# Patient Record
Sex: MALE | Race: WHITE | NOT HISPANIC OR LATINO | Employment: FULL TIME | URBAN - METROPOLITAN AREA
[De-identification: names, ages, dates, MRNs, and addresses within clinical notes are randomized per-mention and may not be internally consistent; named-entity substitution may affect disease eponyms.]

---

## 2017-01-03 ENCOUNTER — ALLSCRIPTS OFFICE VISIT (OUTPATIENT)
Dept: OTHER | Facility: OTHER | Age: 62
End: 2017-01-03

## 2018-01-10 NOTE — RESULT NOTES
Message      Colon polyps removed came back as precancerous tubular adenoma  Next colonoscopy in 3 years  Patient to call our office for any GI symptoms  lmom for pt to call office for results  reminder set  tmc         Verified Results  (1) TISSUE EXAM [id number] 09:44AM [facility]     Test Name Result Flag Reference   LAB AP CASE REPORT (Report)     Surgical Pathology Report             Case: [id number]                   Authorizing Provider: Mila Avila MD     Collected:      11/19/2016 0944        Ordering Location:   [facility]    Received:      11/19/2016 [facility] Endoscopy                               Pathologist:      Tono Rosario MD                              Specimen:  Polyp, Colorectal, ascending colon   LAB AP FINAL DIAGNOSIS (Report)     A  Colon, ascending, polypectomy:        - Tubular adenoma  - No high-grade dysplasia or malignancy is identified  Interpretation performed at , [address]   Electronically signed by Tono Rosario MD on 11/22/2016 at 12:48 PM   LAB AP SURGICAL ADDITIONAL INFORMATION (Report)     These tests were developed and their performance characteristics   determined by [facility] or [facility]  They may not be cleared or approved by the [place]  Food and   Drug Administration  The FDA has determined that such clearance or   approval is not necessary  These tests are used for clinical purposes  They should not be regarded as investigational or for research  This   laboratory has been approved by CLIA 88, designated as a high-complexity   laboratory and is qualified to perform these tests  LAB AP GROSS DESCRIPTION (Report)     A  The specimen is received in formalin, labeled with the patient's name   and hospital number, and is designated ascending colon polyp   The   specimen consists of a single tan soft tissue fragment measuring 0 5 cm    Entirely submitted  One cassette  Note: The estimated total formalin fixation time based upon information   provided by the submitting clinician and the standard processing schedule   is 52 25 hours      MAC

## 2018-01-14 VITALS
HEIGHT: 74 IN | SYSTOLIC BLOOD PRESSURE: 110 MMHG | WEIGHT: 286 LBS | DIASTOLIC BLOOD PRESSURE: 71 MMHG | HEART RATE: 73 BPM | BODY MASS INDEX: 36.7 KG/M2

## 2018-11-30 ENCOUNTER — OFFICE VISIT (OUTPATIENT)
Dept: OBGYN CLINIC | Facility: CLINIC | Age: 63
End: 2018-11-30
Payer: COMMERCIAL

## 2018-11-30 VITALS
SYSTOLIC BLOOD PRESSURE: 160 MMHG | BODY MASS INDEX: 37.22 KG/M2 | HEART RATE: 74 BPM | WEIGHT: 290 LBS | DIASTOLIC BLOOD PRESSURE: 82 MMHG | HEIGHT: 74 IN

## 2018-11-30 DIAGNOSIS — R07.9 LEFT SIDED CHEST PAIN: Primary | ICD-10-CM

## 2018-11-30 PROCEDURE — 99213 OFFICE O/P EST LOW 20 MIN: CPT | Performed by: ORTHOPAEDIC SURGERY

## 2018-11-30 RX ORDER — TERBINAFINE HYDROCHLORIDE 250 MG/1
TABLET ORAL
COMMUNITY
Start: 2018-11-14 | End: 2019-01-15

## 2018-11-30 RX ORDER — MELOXICAM 7.5 MG/1
TABLET ORAL
COMMUNITY
Start: 2018-11-13 | End: 2020-12-30 | Stop reason: ALTCHOICE

## 2018-11-30 NOTE — PROGRESS NOTES
Patient Name:  Johnny Squires  MRN:  [id number]    Assessment     1  Left sided chest pain         Plan     1  No signs of shoulder pathology on exam today  2  Consider esophageal or pulmonary etiology  3  Follow-up with PCP recommended  Patient has an esophageal doctor that he has treated with in the past and plans to schedule an evaluation with him  No Follow-up on file  Subjective     Patient returns today reporting pain in the anterior chest on the left radiating posteriorly to his medial periscapular region  Past surgical history is notable for a rotator cuff repair approximately 2 years ago  He was doing well until recently when he developed his current symptoms  He describes occasional exacerbation with eating a large meal, but the discomfort does not feel like heartburn, which he has had in the past requiring Prilosec until a few years ago  He recently had a ganglion cyst excised from the mid-clavicular region by Dr Marino Reich  No recent injury to his left upper extremity  He had a recent chest exam with his PCP which was unremarkable according to the patient  He is concerned about a recurrent rotator cuff problem  General ROS:  Negative for fever, lethargy/malaise, or night sweats  Neurological ROS:  Negative for confusion or seizures  Objective     /82   Pulse 74   Ht 6' 2" (1 88 m)   Wt 132 kg (290 lb)   BMI 37 23 kg/m²     Left  shoulder: No gross deformity  Nontender to palpation  Passive range of motion is full  Neer impingement sign is negative  Escalera impingement sign is negative  Empty can test is negative  Speed's test is negative  Cross body adduction test is negative  5/5 strength forward flexion  Skin is intact without erythema  2+ radial pulse

## 2018-12-20 ENCOUNTER — TELEPHONE (OUTPATIENT)
Dept: OBGYN CLINIC | Facility: HOSPITAL | Age: 63
End: 2018-12-20

## 2019-01-15 ENCOUNTER — OFFICE VISIT (OUTPATIENT)
Dept: OBGYN CLINIC | Facility: CLINIC | Age: 64
End: 2019-01-15
Payer: COMMERCIAL

## 2019-01-15 VITALS
HEART RATE: 79 BPM | SYSTOLIC BLOOD PRESSURE: 147 MMHG | DIASTOLIC BLOOD PRESSURE: 82 MMHG | WEIGHT: 291 LBS | BODY MASS INDEX: 37.35 KG/M2 | HEIGHT: 74 IN

## 2019-01-15 DIAGNOSIS — L02.414 CUTANEOUS ABSCESS OF LEFT UPPER EXTREMITY: ICD-10-CM

## 2019-01-15 DIAGNOSIS — M19.019 AC JOINT ARTHROPATHY: Primary | ICD-10-CM

## 2019-01-15 PROCEDURE — 99213 OFFICE O/P EST LOW 20 MIN: CPT | Performed by: ORTHOPAEDIC SURGERY

## 2019-01-15 RX ORDER — CLINDAMYCIN HYDROCHLORIDE 300 MG/1
300 CAPSULE ORAL 4 TIMES DAILY
Qty: 56 CAPSULE | Refills: 0 | Status: SHIPPED | OUTPATIENT
Start: 2019-01-15 | End: 2019-01-29

## 2019-01-15 NOTE — PROGRESS NOTES
Patient Name:  Sarika Marc  MRN:  [id number]    Assessment     1  AC joint arthropathy     2  Cutaneous abscess of left upper extremity  clindamycin (CLEOCIN) 300 MG capsule       Plan     Left shoulder AC joint arthropathy with associated ganglion cyst, left shoulder small superficial skin abscess  1  Prescription for clindamycin with regards to the abscess  2  Recommend warm compresses for the left shoulder abscess  3  Recommend continued conservative management for the left shoulder AC joint arthropathy  4  Patient will contact the office if the skin abscess worsens while on oral antibiotics  Follow-up as needed        Subjective     29-year-old male returns to the office today for follow-up regarding his left shoulder  He recently underwent an MRI and is here to review the results  He does note a cyst on the superior and anterior aspect of the shoulder which is minimally painful  He only notes discomfort when he sleeps in bed at night  He denies any weakness or stiffness in the shoulder  No numbness or tingling  No fevers or chills  General ROS:  Negative for fever, lethargy/malaise, or night sweats  Neurological ROS:  Negative for confusion or seizures  Objective     /82   Pulse 79   Ht 6' 2" (1 88 m)   Wt 132 kg (291 lb)   BMI 37 36 kg/m²     Left shoulder:  Fluid collection noted anterior aspect of mid clavicular region which extends to the [facility] joint  No significant tenderness to palpation  Shoulder range of motion is intact and full in all planes without discomfort  Negative Escalera impingement test   Negative empty can test   Negative speed's test   Small superficial skin abscess noted posterior shoulder with scant amount of cloudy drainage noted  Neurovascularly intact left upper extremity  Skin warm and well perfused  Appropriate mood and affect  Data Review     I have personally reviewed pertinent films in PACS, and my interpretation follows      MRI left shoulder reveals a ganglion cyst extending from the acromioclavicular joint along the anterior aspect of the clavicle  Associated moderate degenerative changes noted about the [facility] joint as well  No evidence of rotator cuff tear  Small 10 mm fluid collection noted in the subcutaneous fat posterior to the shoulder joint and scapula      Scribe Attestation    I,:   Mckenna Cedeno PA-C am acting as a scribe while in the presence of the attending physician :        I,:   Celia Reich MD personally performed the services described in this documentation    as scribed in my presence :

## 2019-10-17 ENCOUNTER — OFFICE VISIT (OUTPATIENT)
Dept: PODIATRY | Facility: CLINIC | Age: 64
End: 2019-10-17
Payer: COMMERCIAL

## 2019-10-17 VITALS
HEART RATE: 73 BPM | DIASTOLIC BLOOD PRESSURE: 85 MMHG | BODY MASS INDEX: 36.91 KG/M2 | HEIGHT: 74 IN | SYSTOLIC BLOOD PRESSURE: 159 MMHG | WEIGHT: 287.6 LBS

## 2019-10-17 DIAGNOSIS — G89.29 CHRONIC PAIN OF RIGHT ANKLE: ICD-10-CM

## 2019-10-17 DIAGNOSIS — E11.42 DIABETIC POLYNEUROPATHY ASSOCIATED WITH TYPE 2 DIABETES MELLITUS (HCC): Primary | ICD-10-CM

## 2019-10-17 DIAGNOSIS — M25.571 CHRONIC PAIN OF RIGHT ANKLE: ICD-10-CM

## 2019-10-17 PROCEDURE — 99213 OFFICE O/P EST LOW 20 MIN: CPT | Performed by: PODIATRIST

## 2019-10-17 RX ORDER — MELOXICAM 7.5 MG/1
7.5 TABLET ORAL
Qty: 90 TABLET | Refills: 3 | Status: SHIPPED | OUTPATIENT
Start: 2019-10-17 | End: 2020-06-24 | Stop reason: SDUPTHER

## 2019-10-17 RX ORDER — GABAPENTIN 300 MG/1
300 CAPSULE ORAL
Qty: 90 CAPSULE | Refills: 3 | Status: SHIPPED | OUTPATIENT
Start: 2019-10-17 | End: 2021-03-24

## 2019-10-17 NOTE — PROGRESS NOTES
Assessment/Plan:    Discussed principles of diabetic foot care  Patient has a diminished sensorium but vascular status is within normal limits  Patient urged to refrain from walking barefoot  He was again placed on meloxicam 7 5 mg b i d  And gabapentin 300 mg HS  He was given enough medication for 1 year  He is rescheduled in 1 year  No problem-specific Assessment & Plan notes found for this encounter  Diagnoses and all orders for this visit:    Diabetic polyneuropathy associated with type 2 diabetes mellitus (HCC)  -     gabapentin (NEURONTIN) 300 mg capsule; Take 1 capsule (300 mg total) by mouth daily at bedtime    Chronic pain of right ankle  -     meloxicam (MOBIC) 7 5 mg tablet; Take 1 tablet (7 5 mg total) by mouth 2 (two) times daily after meals          Subjective:      Patient ID: Rand Schneider  is a 59 y o  male  HPI     Patient, a 77-year-old type 2 diabetic presents for assessment  Patient has been taking gabapentin 300 mg HS and meloxicam 7 5 mg b i d  For the past 1 year  It is helped immensely and he has minimal foot pain  Patient had difficulty with chronic right ankle pain but for the most part he is now comfortable  He also has numbness in both feet secondary to neuropathy  Last A1c was 6 8  The following portions of the patient's history were reviewed and updated as appropriate: allergies, current medications, past family history, past medical history, past social history, past surgical history and problem list     Review of Systems   Gastrointestinal: Negative  Musculoskeletal: Positive for arthralgias  Neurological: Positive for numbness  Hematological: Does not bruise/bleed easily  Objective:      /85   Pulse 73   Ht 6' 2" (1 88 m)   Wt 130 kg (287 lb 9 6 oz)   BMI 36 93 kg/m²          Physical Exam   Cardiovascular: Pulses are no weak pulses  Pulses:       Dorsalis pedis pulses are 2+ on the right side, and 2+ on the left side  Posterior tibial pulses are 2+ on the right side, and 2+ on the left side  Feet:   Right Foot:   Skin Integrity: Negative for ulcer, skin breakdown, erythema, warmth, callus or dry skin  Left Foot:   Skin Integrity: Negative for ulcer, skin breakdown, erythema, warmth, callus or dry skin  Diabetic Foot Exam    Patient's shoes and socks removed  Right Foot/Ankle   Right Foot Inspection  Skin Exam: skin normal and skin intact no dry skin, no warmth, no callus, no erythema, no maceration, no abnormal color, no pre-ulcer, no ulcer and no callus                          Toe Exam: ROM and strength within normal limits  Sensory   Vibration: intact  Proprioception: absent   Monofilament testing: diminished  Vascular  Capillary refills: < 3 seconds  The right DP pulse is 2+  The right PT pulse is 2+  Right Toe  - Comprehensive Exam  Ecchymosis: none  Arch: normal  Hammertoes: absent  Claw Toes: absent  Swelling: none   Tenderness: none         Left Foot/Ankle  Left Foot Inspection  Skin Exam: skin normal and skin intactno dry skin, no warmth, no erythema, no maceration, normal color, no pre-ulcer, no ulcer and no callus                         Toe Exam: ROM and strength within normal limits                   Sensory   Vibration: intact  Proprioception: absent  Monofilament: diminished  Vascular  Capillary refills: < 3 seconds  The left DP pulse is 2+  The left PT pulse is 2+  Left Toe  - Comprehensive Exam  Ecchymosis: none  Arch: normal  Hammertoes: absent  Claw toes: absent  Swelling: none   Tenderness: none       Assign Risk Category:  No deformity present; Loss of protective sensation;  No weak pulses       Risk: 1

## 2020-06-18 ENCOUNTER — OFFICE VISIT (OUTPATIENT)
Dept: PODIATRY | Facility: CLINIC | Age: 65
End: 2020-06-18
Payer: COMMERCIAL

## 2020-06-18 ENCOUNTER — HOSPITAL ENCOUNTER (OUTPATIENT)
Dept: RADIOLOGY | Facility: HOSPITAL | Age: 65
Discharge: HOME | End: 2020-06-18

## 2020-06-18 VITALS — WEIGHT: 288 LBS | HEIGHT: 74 IN | BODY MASS INDEX: 36.96 KG/M2

## 2020-06-18 DIAGNOSIS — L03.115 CELLULITIS OF RIGHT LOWER LEG: ICD-10-CM

## 2020-06-18 DIAGNOSIS — S93.491A SPRAIN OF ANTERIOR TALOFIBULAR LIGAMENT OF RIGHT ANKLE, INITIAL ENCOUNTER: Primary | ICD-10-CM

## 2020-06-18 DIAGNOSIS — S93.491A SPRAIN OF ANTERIOR TALOFIBULAR LIGAMENT OF RIGHT ANKLE, INITIAL ENCOUNTER: ICD-10-CM

## 2020-06-18 PROCEDURE — 99214 OFFICE O/P EST MOD 30 MIN: CPT | Performed by: PODIATRIST

## 2020-06-18 RX ORDER — ROSUVASTATIN CALCIUM 20 MG/1
TABLET, COATED ORAL
COMMUNITY
Start: 2020-03-28

## 2020-06-18 RX ORDER — CEPHALEXIN 500 MG/1
500 CAPSULE ORAL EVERY 6 HOURS SCHEDULED
Qty: 40 CAPSULE | Refills: 0 | Status: SHIPPED | OUTPATIENT
Start: 2020-06-18 | End: 2020-06-28

## 2020-06-23 ENCOUNTER — HOSPITAL ENCOUNTER (EMERGENCY)
Facility: HOSPITAL | Age: 65
Discharge: HOME | End: 2020-06-23
Attending: EMERGENCY MEDICINE | Admitting: EMERGENCY MEDICINE
Payer: COMMERCIAL

## 2020-06-23 ENCOUNTER — APPOINTMENT (EMERGENCY)
Dept: RADIOLOGY | Facility: HOSPITAL | Age: 65
End: 2020-06-23
Payer: COMMERCIAL

## 2020-06-23 ENCOUNTER — TRANSCRIBE ORDERS (OUTPATIENT)
Dept: VASCULAR SURGERY | Facility: CLINIC | Age: 65
End: 2020-06-23

## 2020-06-23 ENCOUNTER — TELEPHONE (OUTPATIENT)
Dept: VASCULAR SURGERY | Facility: CLINIC | Age: 65
End: 2020-06-23

## 2020-06-23 ENCOUNTER — APPOINTMENT (EMERGENCY)
Dept: ULTRASOUND IMAGING | Facility: HOSPITAL | Age: 65
End: 2020-06-23
Payer: COMMERCIAL

## 2020-06-23 ENCOUNTER — APPOINTMENT (EMERGENCY)
Dept: CT IMAGING | Facility: HOSPITAL | Age: 65
End: 2020-06-23
Payer: COMMERCIAL

## 2020-06-23 VITALS
BODY MASS INDEX: 36.45 KG/M2 | DIASTOLIC BLOOD PRESSURE: 72 MMHG | OXYGEN SATURATION: 97 % | TEMPERATURE: 98.4 F | HEIGHT: 74 IN | HEART RATE: 60 BPM | RESPIRATION RATE: 18 BRPM | WEIGHT: 284 LBS | SYSTOLIC BLOOD PRESSURE: 144 MMHG
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
## 2020-06-23 DIAGNOSIS — I82.409 DVT (DEEP VENOUS THROMBOSIS) (HCC): Primary | ICD-10-CM

## 2020-06-23 DIAGNOSIS — M25.512 LEFT SHOULDER PAIN: ICD-10-CM

## 2020-06-23 DIAGNOSIS — R22.41 LOCALIZED SWELLING, MASS AND LUMP, RIGHT LOWER LIMB: Primary | ICD-10-CM

## 2020-06-23 LAB
ANION GAP SERPL CALCULATED.3IONS-SCNC: 5 MMOL/L (ref 4–13)
BASOPHILS # BLD AUTO: 0.04 THOUSANDS/ΜL (ref 0–0.1)
BASOPHILS NFR BLD AUTO: 1 % (ref 0–1)
BUN SERPL-MCNC: 29 MG/DL (ref 5–25)
CALCIUM SERPL-MCNC: 9.3 MG/DL (ref 8.3–10.1)
CHLORIDE SERPL-SCNC: 103 MMOL/L (ref 100–108)
CO2 SERPL-SCNC: 26 MMOL/L (ref 21–32)
CREAT SERPL-MCNC: 1.11 MG/DL (ref 0.6–1.3)
EOSINOPHIL # BLD AUTO: 0.11 THOUSAND/ΜL (ref 0–0.61)
EOSINOPHIL NFR BLD AUTO: 2 % (ref 0–6)
ERYTHROCYTE [DISTWIDTH] IN BLOOD BY AUTOMATED COUNT: 13.6 % (ref 11.6–15.1)
GFR SERPL CREATININE-BSD FRML MDRD: 70 ML/MIN/1.73SQ M
GLUCOSE SERPL-MCNC: 179 MG/DL (ref 65–140)
HCT VFR BLD AUTO: 39.6 % (ref 36.5–49.3)
HGB BLD-MCNC: 13.2 G/DL (ref 12–17)
IMM GRANULOCYTES # BLD AUTO: 0.03 THOUSAND/UL (ref 0–0.2)
IMM GRANULOCYTES NFR BLD AUTO: 0 % (ref 0–2)
LYMPHOCYTES # BLD AUTO: 0.95 THOUSANDS/ΜL (ref 0.6–4.47)
LYMPHOCYTES NFR BLD AUTO: 13 % (ref 14–44)
MCH RBC QN AUTO: 30.5 PG (ref 26.8–34.3)
MCHC RBC AUTO-ENTMCNC: 33.3 G/DL (ref 31.4–37.4)
MCV RBC AUTO: 92 FL (ref 82–98)
MONOCYTES # BLD AUTO: 0.44 THOUSAND/ΜL (ref 0.17–1.22)
MONOCYTES NFR BLD AUTO: 6 % (ref 4–12)
NEUTROPHILS # BLD AUTO: 5.81 THOUSANDS/ΜL (ref 1.85–7.62)
NEUTS SEG NFR BLD AUTO: 78 % (ref 43–75)
NRBC BLD AUTO-RTO: 0 /100 WBCS
PLATELET # BLD AUTO: 292 THOUSANDS/UL (ref 149–390)
PMV BLD AUTO: 10.1 FL (ref 8.9–12.7)
POTASSIUM SERPL-SCNC: 3.8 MMOL/L (ref 3.5–5.3)
RBC # BLD AUTO: 4.33 MILLION/UL (ref 3.88–5.62)
SODIUM SERPL-SCNC: 134 MMOL/L (ref 136–145)
WBC # BLD AUTO: 7.38 THOUSAND/UL (ref 4.31–10.16)

## 2020-06-23 PROCEDURE — 80048 BASIC METABOLIC PNL TOTAL CA: CPT | Performed by: PHYSICIAN ASSISTANT

## 2020-06-23 PROCEDURE — 99284 EMERGENCY DEPT VISIT MOD MDM: CPT

## 2020-06-23 PROCEDURE — 93970 EXTREMITY STUDY: CPT

## 2020-06-23 PROCEDURE — 73030 X-RAY EXAM OF SHOULDER: CPT

## 2020-06-23 PROCEDURE — 70450 CT HEAD/BRAIN W/O DYE: CPT

## 2020-06-23 PROCEDURE — 93970 EXTREMITY STUDY: CPT | Performed by: SURGERY

## 2020-06-23 PROCEDURE — 36415 COLL VENOUS BLD VENIPUNCTURE: CPT | Performed by: PHYSICIAN ASSISTANT

## 2020-06-23 PROCEDURE — 72125 CT NECK SPINE W/O DYE: CPT

## 2020-06-23 PROCEDURE — 85025 COMPLETE CBC W/AUTO DIFF WBC: CPT | Performed by: PHYSICIAN ASSISTANT

## 2020-06-23 PROCEDURE — 99285 EMERGENCY DEPT VISIT HI MDM: CPT | Performed by: PHYSICIAN ASSISTANT

## 2020-06-23 RX ORDER — METHOCARBAMOL 500 MG/1
500 TABLET, FILM COATED ORAL 2 TIMES DAILY
Qty: 20 TABLET | Refills: 0 | Status: SHIPPED | OUTPATIENT
Start: 2020-06-23 | End: 2020-09-09 | Stop reason: ALTCHOICE

## 2020-06-23 RX ADMIN — APIXABAN 10 MG: 5 TABLET, FILM COATED ORAL at 11:54

## 2020-06-23 NOTE — ED NOTES
PT awake and alert, no distress noted  No other questions upon d/c       April Yulia Gloria RN  06/23/20 [id number]

## 2020-06-23 NOTE — ED NOTES
Patient ambulated to radiology w radiology tech  Steady gait, negative distress       Judith Woods  06/23/20 [id number]
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
## 2020-06-24 ENCOUNTER — OFFICE VISIT (OUTPATIENT)
Dept: VASCULAR SURGERY | Facility: CLINIC | Age: 65
End: 2020-06-24
Payer: COMMERCIAL

## 2020-06-24 VITALS
BODY MASS INDEX: 34.91 KG/M2 | HEIGHT: 74 IN | SYSTOLIC BLOOD PRESSURE: 118 MMHG | WEIGHT: 272 LBS | TEMPERATURE: 98 F | DIASTOLIC BLOOD PRESSURE: 60 MMHG | HEART RATE: 70 BPM

## 2020-06-24 DIAGNOSIS — I87.2 VENOUS STASIS DERMATITIS OF RIGHT LOWER EXTREMITY: ICD-10-CM

## 2020-06-24 DIAGNOSIS — R22.41 LOCALIZED SWELLING, MASS AND LUMP, RIGHT LOWER LIMB: Primary | ICD-10-CM

## 2020-06-24 DIAGNOSIS — I82.411 ACUTE DEEP VEIN THROMBOSIS (DVT) OF RIGHT FEMORAL VEIN (HCC): ICD-10-CM

## 2020-06-24 PROCEDURE — 99244 OFF/OP CNSLTJ NEW/EST MOD 40: CPT | Performed by: SURGERY

## 2020-06-24 RX ORDER — TRIAMCINOLONE ACETONIDE 1 MG/G
CREAM TOPICAL 2 TIMES DAILY
Qty: 30 G | Refills: 1 | Status: SHIPPED | OUTPATIENT
Start: 2020-06-24 | End: 2020-12-30 | Stop reason: ALTCHOICE

## 2020-07-15 DIAGNOSIS — I82.411 ACUTE DEEP VEIN THROMBOSIS (DVT) OF RIGHT FEMORAL VEIN (HCC): Primary | ICD-10-CM

## 2020-09-01 ENCOUNTER — HOSPITAL ENCOUNTER (OUTPATIENT)
Dept: NON INVASIVE DIAGNOSTICS | Facility: CLINIC | Age: 65
Discharge: HOME | End: 2020-09-01
Payer: COMMERCIAL

## 2020-09-01 DIAGNOSIS — I82.411 ACUTE DEEP VEIN THROMBOSIS (DVT) OF RIGHT FEMORAL VEIN (HCC): ICD-10-CM

## 2020-09-01 DIAGNOSIS — R22.41 LOCALIZED SWELLING, MASS AND LUMP, RIGHT LOWER LIMB: ICD-10-CM

## 2020-09-01 PROCEDURE — 93971 EXTREMITY STUDY: CPT

## 2020-09-03 PROCEDURE — 93971 EXTREMITY STUDY: CPT | Performed by: SURGERY

## 2020-09-09 ENCOUNTER — OFFICE VISIT (OUTPATIENT)
Dept: VASCULAR SURGERY | Facility: CLINIC | Age: 65
End: 2020-09-09
Payer: COMMERCIAL

## 2020-09-09 VITALS
SYSTOLIC BLOOD PRESSURE: 100 MMHG | HEART RATE: 68 BPM | DIASTOLIC BLOOD PRESSURE: 60 MMHG | HEIGHT: 74 IN | BODY MASS INDEX: 35.68 KG/M2 | WEIGHT: 278 LBS | TEMPERATURE: 98.9 F

## 2020-09-09 DIAGNOSIS — I87.2 VENOUS STASIS DERMATITIS OF RIGHT LOWER EXTREMITY: ICD-10-CM

## 2020-09-09 DIAGNOSIS — I82.411 ACUTE DEEP VEIN THROMBOSIS (DVT) OF RIGHT FEMORAL VEIN (HCC): Primary | ICD-10-CM

## 2020-09-09 PROCEDURE — 99214 OFFICE O/P EST MOD 30 MIN: CPT | Performed by: SURGERY

## 2020-09-09 NOTE — ASSESSMENT & PLAN NOTE
On eliquis for fem pop segment DVT> duplex reviewed, clot is now subacute  Leg swelling persists  Will do 3 more months of eliquis for total of 6 months  Continue with compression stockings and leg elevation, moisturizer  It was provoked clot so beyond 6 months we do not need any further treatment

## 2020-09-09 NOTE — PATIENT INSTRUCTIONS
Continue eliquis till Dec 23rd  F/u after that  Leg elevation and compression stockings  No restriction in activity  F/u with primary care regarding colonoscopy and prostrate

## 2020-09-09 NOTE — PROGRESS NOTES
Assessment/Plan:    Acute deep vein thrombosis (DVT) of right femoral vein (HCC)  On eliquis for fem pop segment DVT> duplex reviewed, clot is now subacute  Leg swelling persists  Will do 3 more months of eliquis for total of 6 months  Continue with compression stockings and leg elevation, moisturizer  It was provoked clot so beyond 6 months we do not need any further treatment  Venous stasis dermatitis of right lower extremity  D/t venous insufficiency, recent DVT, treated with kenalog  It does not improve he may need use of pneumatic compression pumps    Diagnoses and all orders for this visit:    Acute deep vein thrombosis (DVT) of right femoral vein (HCC)  -     apixaban (ELIQUIS) 5 mg; Take 1 tablet (5 mg total) by mouth 2 (two) times a day    Venous stasis dermatitis of right lower extremity          Subjective:      Patient ID: Jodean Goodpasture  is a 59 y o  male  Patient presents today to review LEV done 9/1  Assess for resolution of previously noted RLE DVT discovered 6/23  HPI  Follow up after right leg DVT  Patient has been treated with Eliquis for the past 3 months with improvement in swelling and dermatitis  He has been using the cream as needed  He has been back to work and has to stand for prolonged duration at work which makes his leg swelling worse towards the end of the day  It improves with elevation  He has been using Tubigrip compression  The following portions of the patient's history were reviewed and updated as appropriate: allergies, current medications, past family history, past medical history, past social history, past surgical history and problem list     Review of Systems   Constitutional: Negative  HENT: Negative  Eyes: Negative  Respiratory: Negative  Cardiovascular: Positive for leg swelling  Gastrointestinal: Negative  Endocrine: Negative  Genitourinary: Negative  Musculoskeletal: Negative  Skin: Positive for color change  Allergic/Immunologic: Negative  Neurological: Negative  Hematological: Negative  Psychiatric/Behavioral: Negative  I have reviewed the ROS as entered and made changes as necessary  Objective:      /60 (BP Location: Right arm, Patient Position: Sitting)   Pulse 68   Temp 98 9 °F (37 2 °C) (Tympanic)   Ht 6' 2" (1 88 m)   Wt 126 kg (278 lb)   BMI 35 69 kg/m²          Physical Exam  Vitals signs and nursing note reviewed  Constitutional:       Appearance: Normal appearance  He is obese  HENT:      Head: Normocephalic and atraumatic  Neck:      Musculoskeletal: Neck supple  Cardiovascular:      Rate and Rhythm: Normal rate and regular rhythm  Pulmonary:      Effort: Pulmonary effort is normal    Musculoskeletal:         General: Swelling present  No tenderness, deformity or signs of injury  Right lower leg: Edema (2+) present  Skin:     Capillary Refill: Capillary refill takes less than 2 seconds  Coloration: Skin is not jaundiced or pale  Findings: Bruising present  No erythema, lesion or rash  Neurological:      General: No focal deficit present  Mental Status: He is alert and oriented to person, place, and time

## 2020-10-15 ENCOUNTER — OFFICE VISIT (OUTPATIENT)
Dept: PODIATRY | Facility: CLINIC | Age: 65
End: 2020-10-15
Payer: COMMERCIAL

## 2020-10-15 VITALS — SYSTOLIC BLOOD PRESSURE: 116 MMHG | HEART RATE: 65 BPM | DIASTOLIC BLOOD PRESSURE: 70 MMHG

## 2020-10-15 DIAGNOSIS — E11.42 DIABETIC POLYNEUROPATHY ASSOCIATED WITH TYPE 2 DIABETES MELLITUS (HCC): Primary | ICD-10-CM

## 2020-10-15 PROCEDURE — 99213 OFFICE O/P EST LOW 20 MIN: CPT | Performed by: PODIATRIST

## 2020-11-05 DIAGNOSIS — E11.42 DIABETIC POLYNEUROPATHY ASSOCIATED WITH TYPE 2 DIABETES MELLITUS (HCC): Primary | ICD-10-CM

## 2020-11-05 RX ORDER — GABAPENTIN 300 MG/1
300 CAPSULE ORAL
Qty: 90 CAPSULE | Refills: 1 | Status: SHIPPED | OUTPATIENT
Start: 2020-11-05 | End: 2020-12-30 | Stop reason: SDUPTHER

## 2020-12-29 ENCOUNTER — TELEPHONE (OUTPATIENT)
Dept: ADMINISTRATIVE | Facility: HOSPITAL | Age: 65
End: 2020-12-29

## 2020-12-30 ENCOUNTER — OFFICE VISIT (OUTPATIENT)
Dept: VASCULAR SURGERY | Facility: CLINIC | Age: 65
End: 2020-12-30
Payer: COMMERCIAL

## 2020-12-30 VITALS
DIASTOLIC BLOOD PRESSURE: 68 MMHG | TEMPERATURE: 98.3 F | WEIGHT: 285 LBS | SYSTOLIC BLOOD PRESSURE: 118 MMHG | BODY MASS INDEX: 36.57 KG/M2 | HEIGHT: 74 IN | HEART RATE: 64 BPM

## 2020-12-30 DIAGNOSIS — K92.1 BLOOD IN STOOL: ICD-10-CM

## 2020-12-30 DIAGNOSIS — I82.411 ACUTE DEEP VEIN THROMBOSIS (DVT) OF RIGHT FEMORAL VEIN (HCC): Primary | ICD-10-CM

## 2020-12-30 DIAGNOSIS — I87.2 VENOUS STASIS DERMATITIS OF RIGHT LOWER EXTREMITY: ICD-10-CM

## 2020-12-30 PROCEDURE — 99213 OFFICE O/P EST LOW 20 MIN: CPT | Performed by: SURGERY

## 2020-12-30 RX ORDER — ASPIRIN 81 MG/1
81 TABLET ORAL DAILY
Qty: 90 TABLET | Refills: 3 | Status: SHIPPED | OUTPATIENT
Start: 2020-12-30 | End: 2021-12-25

## 2021-01-07 ENCOUNTER — TELEPHONE (OUTPATIENT)
Dept: GASTROENTEROLOGY | Facility: AMBULARY SURGERY CENTER | Age: 66
End: 2021-01-07

## 2021-02-08 ENCOUNTER — TELEPHONE (OUTPATIENT)
Dept: PODIATRY | Facility: CLINIC | Age: 66
End: 2021-02-08

## 2021-02-08 DIAGNOSIS — E11.42 DIABETIC POLYNEUROPATHY ASSOCIATED WITH TYPE 2 DIABETES MELLITUS (HCC): Primary | ICD-10-CM

## 2021-02-08 RX ORDER — GABAPENTIN 300 MG/1
300 CAPSULE ORAL
Qty: 90 CAPSULE | Refills: 0 | Status: SHIPPED | OUTPATIENT
Start: 2021-02-08 | End: 2021-05-09

## 2021-02-08 NOTE — TELEPHONE ENCOUNTER
Juan Carlos called, he needs a refill of Gabapentin sent over to [facility] in [facility] [place]

## 2021-03-24 ENCOUNTER — OFFICE VISIT (OUTPATIENT)
Dept: VASCULAR SURGERY | Facility: CLINIC | Age: 66
End: 2021-03-24
Payer: COMMERCIAL

## 2021-03-24 VITALS
WEIGHT: 280 LBS | HEART RATE: 76 BPM | BODY MASS INDEX: 35.94 KG/M2 | HEIGHT: 74 IN | TEMPERATURE: 98.2 F | DIASTOLIC BLOOD PRESSURE: 70 MMHG | SYSTOLIC BLOOD PRESSURE: 120 MMHG

## 2021-03-24 DIAGNOSIS — I82.411 ACUTE DEEP VEIN THROMBOSIS (DVT) OF RIGHT FEMORAL VEIN (HCC): Primary | ICD-10-CM

## 2021-03-24 DIAGNOSIS — I87.2 VENOUS STASIS DERMATITIS OF RIGHT LOWER EXTREMITY: ICD-10-CM

## 2021-03-24 PROCEDURE — 99213 OFFICE O/P EST LOW 20 MIN: CPT | Performed by: SURGERY

## 2021-03-24 NOTE — PROGRESS NOTES
Assessment/Plan:    Acute deep vein thrombosis (DVT) of right femoral vein ([address] )  Finished eliquis treatment  Leg swelling controlled with stockings and elevation  Doing well overall  Will follow up as needed  Mild venous insufficiency as evidenced by leg swelling  Diagnoses and all orders for this visit:    Acute deep vein thrombosis (DVT) of right femoral vein (HCC)    Venous stasis dermatitis of right lower extremity          Subjective:      Patient ID: Tanika Daly  is a 72 y o  male  Patient w/ provokes RLE DVT presents today for 3 month f/u visit  HPI  H/o right leg DVT who has now finished course of eliquis  He is taking daily 81mg aspirin  Wearing compression stockings to manage swelling  He has to stand for prolonged periods at work and this causes leg swelling  The following portions of the patient's history were reviewed and updated as appropriate: allergies, current medications, past family history, past medical history, past social history, past surgical history and problem list     Review of Systems   Constitutional: Negative  HENT: Negative  Eyes: Negative  Respiratory: Negative  Cardiovascular: Positive for leg swelling  Gastrointestinal: Negative  Endocrine: Negative  Genitourinary: Negative  Musculoskeletal: Negative  Skin: Negative  Allergic/Immunologic: Negative  Neurological: Negative  Hematological: Negative  Psychiatric/Behavioral: Negative  I have reviewed the review of systems as entered and made appropriate changes as necessary    Objective:      /70 (BP Location: Right arm, Patient Position: Sitting)   Pulse 76   Temp 98 2 °F (36 8 °C) (Tympanic)   Ht 6' 2" (1 88 m)   Wt 127 kg (280 lb)   BMI 35 95 kg/m²          Physical Exam  Vitals signs reviewed  Constitutional:       Appearance: He is obese  He is not ill-appearing  Cardiovascular:      Rate and Rhythm: Normal rate     Musculoskeletal:      Right lower leg: Edema present  Skin:     General: Skin is dry  Capillary Refill: Capillary refill takes less than 2 seconds  Neurological:      General: No focal deficit present  Mental Status: He is oriented to person, place, and time     Psychiatric:         Mood and Affect: Mood normal          Behavior: Behavior normal

## 2021-03-24 NOTE — PATIENT INSTRUCTIONS
Deep Vein Thrombosis   AMBULATORY CARE:   A deep vein thrombosis (DVT)  is a blood clot that forms in a deep vein of the body  The deep veins in the legs, thighs, and hips are the most common sites for DVT  A DVT can also occur in a deep vein within your arms  The clot prevents the normal flow of blood in the vein  The blood backs up and causes pain and swelling  The DVT can break into smaller pieces and travel to your lungs and cause a blockage called a pulmonary embolism  A pulmonary embolism can become life-threatening  Common symptoms include the following:   Swelling    Redness    Warmth, pain, or tenderness     Call your local emergency number (911 in the [address], located within [facility] [facility]) if:   You feel lightheaded, short of breath, and have chest pain  You cough up blood  Call your doctor if:   Your arm or leg feels warm, tender, and painful  It may look swollen and red  You have questions or concerns about your condition or care  Treatment for a DVT  may include any of the following:  Blood thinners  help prevent blood clots  Clots can cause strokes, heart attacks, and death  The following are general safety guidelines to follow while you are taking a blood thinner:    Watch for bleeding and bruising while you take blood thinners  Watch for bleeding from your gums or nose  Watch for blood in your urine and bowel movements  Use a soft washcloth on your skin, and a soft toothbrush to brush your teeth  This can keep your skin and gums from bleeding  If you shave, use an electric shaver  Do not play contact sports  Tell your dentist and other healthcare providers that you take a blood thinner  Wear a bracelet or necklace that says you take this medicine  Do not start or stop any other medicines unless your healthcare provider tells you to  Many medicines cannot be used with blood thinners  Take your blood thinner exactly as prescribed by your healthcare provider  Do not skip does or take less than prescribed   Tell your provider right away if you forget to take your blood thinner, or if you take too much  Warfarin  is a blood thinner that you may need to take  The following are things you should be aware of if you take warfarin:     Foods and medicines can affect the amount of warfarin in your blood  Do not make major changes to your diet while you take warfarin  Warfarin works best when you eat about the same amount of vitamin K every day  Vitamin K is found in green leafy vegetables and certain other foods  Ask for more information about what to eat when you are taking warfarin  You will need to see your healthcare provider for follow-up visits when you are on warfarin  You will need regular blood tests  These tests are used to decide how much medicine you need  Clot busters  are emergency medicines that work to dissolve blood clots  A vena cava filter  may be placed inside your vena cava to treat your DVT  The vena cava is a large vein that brings blood from your lower body up to your heart  The filter may help trap pieces of a blood clot and prevent them from going into your lungs  Surgery  called a thrombectomy may be done to remove the clot  A procedure called thrombolysis may instead be done to inject a clot buster that helps break the clot apart  Manage a DVT:   Wear pressure stockings as directed  The stockings put pressure on your legs  This improves blood flow and helps prevent clots  Wear the stockings during the day  Do not wear them when you sleep  Elevate your legs above the level of your heart  Elevate your legs when you sit or lie down, as often as you can  This will help decrease swelling and pain  Prop your legs on pillows or blankets to keep them elevated comfortably  Prevent a DVT:   Exercise regularly to help increase your blood flow  Walking is a good low-impact exercise  Talk to your healthcare provider about the best exercise plan for you           Change your body position or move around often  Move and stretch in your seat several times each hour if you travel by car or work at a desk  In an airplane, get up and walk every hour  Move your legs by tightening and releasing your leg muscles while sitting  You can move your legs while sitting by raising and lowering your heels  Keep your toes on the floor while you do this  You can also raise and lower your toes while keeping your heels on the floor  Maintain a healthy weight  Ask your healthcare provider how much you should weigh  Ask him or her to help you create a weight loss plan if you are overweight  Do not smoke  Nicotine and other chemicals in cigarettes and cigars can damage blood vessels and make it more difficult to manage your DVT  Ask your healthcare provider for information if you currently smoke and need help to quit  E-cigarettes or smokeless tobacco still contain nicotine  Talk to your healthcare provider before you use these products  Ask about birth control if you are a woman who takes the pill  A birth control pill increases the risk for PE in certain women  The risk is higher if you are also older than 35, smoke cigarettes, or have a blood clotting disorder  Talk to your healthcare provider about other ways to prevent pregnancy, such as a cervical cap or intrauterine device (IUD)  Follow up with your doctor or specialist as directed: You may need to come in regularly for scans to check for blood clots  Your blood may checked to see how long it takes to clot  Your doctor or specialist will tell you if you need to have this test and how often to have it  Write down your questions so you remember to ask them during your visits  © Copyright [address] Information is for End User's use only and may not be sold, redistributed or otherwise used for commercial purposes   All illustrations and images included in CareNotes® are the copyrighted property of [employer]  or [facility]  The above information is an  only  It is not intended as medical advice for individual conditions or treatments  Talk to your doctor, nurse or pharmacist before following any medical regimen to see if it is safe and effective for you

## 2021-03-24 NOTE — ASSESSMENT & PLAN NOTE
Finished eliquis treatment  Leg swelling controlled with stockings and elevation  Doing well overall  Will follow up as needed  Mild venous insufficiency as evidenced by leg swelling

## 2021-05-03 ENCOUNTER — TELEPHONE (OUTPATIENT)
Dept: PODIATRY | Facility: CLINIC | Age: 66
End: 2021-05-03

## 2021-05-03 NOTE — TELEPHONE ENCOUNTER
Juan Carlos called, he is requesting a refill of Gabapentin and also Meloxicam to be [place] [facility] [place] [facility] [place]  He said that Dr Lucrecia Matute took him off the Meloxicam because he was taking Eloquis but he no longer takes that as of 1/15/21

## 2021-05-05 DIAGNOSIS — E11.42 DIABETIC POLYNEUROPATHY ASSOCIATED WITH TYPE 2 DIABETES MELLITUS (HCC): Primary | ICD-10-CM

## 2021-05-05 RX ORDER — GABAPENTIN 300 MG/1
300 CAPSULE ORAL
Qty: 90 CAPSULE | Refills: 1 | Status: SHIPPED | OUTPATIENT
Start: 2021-05-05 | End: 2021-11-01

## 2021-06-01 NOTE — TELEPHONE ENCOUNTER
Wife called to get script for Eliquis 5 mg BID    Patient saw Dr Kizzy Calhoun on 6/24/20 and advised to continue for 3-6 months
Patent

## 2021-10-21 ENCOUNTER — OFFICE VISIT (OUTPATIENT)
Dept: PODIATRY | Facility: CLINIC | Age: 66
End: 2021-10-21
Payer: COMMERCIAL

## 2021-10-21 VITALS
BODY MASS INDEX: 35.94 KG/M2 | SYSTOLIC BLOOD PRESSURE: 130 MMHG | HEIGHT: 74 IN | DIASTOLIC BLOOD PRESSURE: 73 MMHG | WEIGHT: 280 LBS | HEART RATE: 65 BPM

## 2021-10-21 DIAGNOSIS — E11.42 DIABETIC POLYNEUROPATHY ASSOCIATED WITH TYPE 2 DIABETES MELLITUS (HCC): Primary | ICD-10-CM

## 2021-10-21 PROCEDURE — 99213 OFFICE O/P EST LOW 20 MIN: CPT | Performed by: PODIATRIST

## 2021-10-21 RX ORDER — FENOFIBRATE 145 MG/1
145 TABLET ORAL DAILY
COMMUNITY
Start: 2021-09-16

## 2022-10-25 ENCOUNTER — OFFICE VISIT (OUTPATIENT)
Dept: PODIATRY | Facility: CLINIC | Age: 67
End: 2022-10-25
Payer: COMMERCIAL

## 2022-10-25 VITALS
WEIGHT: 256.4 LBS | HEART RATE: 60 BPM | BODY MASS INDEX: 32.92 KG/M2 | SYSTOLIC BLOOD PRESSURE: 114 MMHG | DIASTOLIC BLOOD PRESSURE: 71 MMHG

## 2022-10-25 DIAGNOSIS — E11.42 DIABETIC POLYNEUROPATHY ASSOCIATED WITH TYPE 2 DIABETES MELLITUS (HCC): Primary | ICD-10-CM

## 2022-10-25 PROCEDURE — 99213 OFFICE O/P EST LOW 20 MIN: CPT | Performed by: PODIATRIST

## 2022-10-25 NOTE — PROGRESS NOTES
Assessment/Plan:    Discussed principles of diabetic foot care  Vascular status is within normal limits  Patient has profound sensory neuropathy  He was told to refrain from walking barefoot  Trimmed hyperkeratotic lesion left 4th toe  Yearly evaluation recommended  Note:  25 minutes spent with patient  No problem-specific Assessment & Plan notes found for this encounter  Diagnoses and all orders for this visit:    Diabetic polyneuropathy associated with type 2 diabetes mellitus (HCC)          Subjective:      Patient ID: Waleska Zavala  is a 79 y o  male  HPI     Patient, a 26-year-old type 2 diabetic with known neuropathy presents for his yearly diabetic foot exam   Patient has a corn on the left 4th toe but it is asymptomatic  Patient has profound numbness both feet  He has not had any significant foot problems over the course of the year  States that his blood sugar is under good control typically running 114-120  There is no A1c in the chart that is recent  The following portions of the patient's history were reviewed and updated as appropriate: allergies, current medications, past family history, past medical history, past social history, past surgical history and problem list     Review of Systems   Cardiovascular:        History of DVT   Neurological: Positive for numbness  Objective:      /71   Pulse 60   Wt 116 kg (256 lb 6 4 oz)   BMI 32 92 kg/m²          Physical Exam  Cardiovascular:      Pulses: no weak pulses          Dorsalis pedis pulses are 2+ on the right side and 2+ on the left side  Posterior tibial pulses are 2+ on the right side and 2+ on the left side  Feet:      Right foot:      Skin integrity: No ulcer, skin breakdown, erythema, warmth, callus or dry skin  Left foot:      Skin integrity: Callus present  No ulcer, skin breakdown, erythema, warmth or dry skin             Diabetic Foot Exam    Patient's shoes and socks removed  Right Foot/Ankle   Right Foot Inspection  Skin Exam: skin normal and skin intact  No dry skin, no warmth, no callus, no erythema, no maceration, no abnormal color, no pre-ulcer, no ulcer and no callus  Toe Exam: ROM and strength within normal limits  Sensory   Vibration: absent  Proprioception: absent  Monofilament testing: absent    Vascular  Capillary refills: < 3 seconds  The right DP pulse is 2+  The right PT pulse is 2+  Right Toe  - Comprehensive Exam  Ecchymosis: none  Arch: normal  Hammertoes: absent  Claw Toes: absent  Swelling: none   Tenderness: none         Left Foot/Ankle  Left Foot Inspection  Skin Exam: skin normal, skin intact and callus  No dry skin, no warmth, no erythema, no maceration, normal color, no pre-ulcer and no ulcer  Toe Exam: left toe deformity  Sensory   Vibration: absent  Proprioception: absent      Vascular  Capillary refills: < 3 seconds  The left DP pulse is 2+  The left PT pulse is 2+       Left Toe  - Comprehensive Exam  Ecchymosis: none  Arch: normal  Hammertoes: fourth toe  Claw toes: absent  Swelling: none   Tenderness: none           Assign Risk Category  Deformity present  Loss of protective sensation  No weak pulses  Risk: 2

## 2023-10-10 ENCOUNTER — OFFICE VISIT (OUTPATIENT)
Dept: GASTROENTEROLOGY | Facility: CLINIC | Age: 68
End: 2023-10-10
Payer: COMMERCIAL

## 2023-10-10 ENCOUNTER — TELEPHONE (OUTPATIENT)
Age: 68
End: 2023-10-10

## 2023-10-10 VITALS
HEIGHT: 73 IN | HEART RATE: 78 BPM | DIASTOLIC BLOOD PRESSURE: 77 MMHG | SYSTOLIC BLOOD PRESSURE: 135 MMHG | OXYGEN SATURATION: 97 % | WEIGHT: 268 LBS | BODY MASS INDEX: 35.52 KG/M2

## 2023-10-10 DIAGNOSIS — K62.5 BRBPR (BRIGHT RED BLOOD PER RECTUM): ICD-10-CM

## 2023-10-10 DIAGNOSIS — R13.10 DYSPHAGIA, UNSPECIFIED TYPE: ICD-10-CM

## 2023-10-10 DIAGNOSIS — K59.09 OTHER CONSTIPATION: Primary | ICD-10-CM

## 2023-10-10 PROCEDURE — 99244 OFF/OP CNSLTJ NEW/EST MOD 40: CPT | Performed by: INTERNAL MEDICINE

## 2023-10-10 RX ORDER — BISACODYL 5 MG/1
10 TABLET, DELAYED RELEASE ORAL ONCE
Qty: 2 TABLET | Refills: 0 | Status: SHIPPED | OUTPATIENT
Start: 2023-10-10 | End: 2023-10-10

## 2023-10-10 NOTE — TELEPHONE ENCOUNTER
Patients GI provider:  Dr. Helene Cuellar    Number to return call: [phone]    Reason for call: Pt calling because he was in the office today and paid with a check and he thinks he either forgot to sign it or forgot to write the amount. I tried to call the office to verify but was unable to connect. Pt is asking if you can check on that for him and call. He says you will likely get his wife or their machine and can leave him a message either way.      Scheduled procedure/appointment date if applicable: 78.12.98

## 2023-10-10 NOTE — ASSESSMENT & PLAN NOTE
Probable physiologic changes. Rule out colorectal lesions including polyps or malignancy. -Advised about stool softeners, MiraLAX, fiber supplements. Increase oral hydration.    -Avoid straining during defecation    -Schedule for colonoscopy. -High-fiber diet.    -Patient was given instructions about the colonoscopy prep.    -Patient was explained about the risks and benefits of the procedure. Risks including but not limited to bleeding, infection, perforation were explained in detail. Also explained about less than 100% sensitivity with the exam and other alternatives.

## 2023-10-10 NOTE — PROGRESS NOTES
Consultation - [address] [place] [address] [facility]  Marcella Zhu. 1955 male         Chief Complaint: Constipation, rectal bleeding    HPI: 40-year-old male with history of hypertension, diabetes mellitus, DVT reports having issues with constipation recently when he also noticed fresh bleeding from the rectum. Denies abdominal pain, nausea or vomiting. Good appetite, no recent weight loss. He complains about difficulty swallowing with bread and [place] fries mostly and had esophagus stretched in the past.  Complaining about occasional acid reflux. Last EGD and colonoscopy in 2016    Chaperon: Ms. John Alvarenga: Review of Systems   Constitutional: Negative for activity change, appetite change, chills, diaphoresis, fatigue, fever and unexpected weight change. HENT: Negative for ear discharge, ear pain, facial swelling, hearing loss, nosebleeds, sore throat, tinnitus and voice change. Eyes: Negative for pain, discharge, redness, itching and visual disturbance. Respiratory: Negative for apnea, cough, chest tightness, shortness of breath and wheezing. Cardiovascular: Negative for chest pain and palpitations. Gastrointestinal:        As noted in HPI   Endocrine: Negative for cold intolerance, heat intolerance and polyuria. Genitourinary: Positive for frequency. Negative for difficulty urinating, dysuria, flank pain, hematuria and urgency. Musculoskeletal: Negative for arthralgias, back pain, gait problem, joint swelling and myalgias. Skin: Negative for rash and wound. Neurological: Negative for dizziness, tremors, seizures, speech difficulty, light-headedness, numbness and headaches. Hematological: Negative for adenopathy. Does not bruise/bleed easily. Psychiatric/Behavioral: Negative for agitation, behavioral problems and confusion. The patient is not nervous/anxious.          Past Medical History:   Diagnosis Date   • Diabetes mellitus ([address] )     boaderline   • DVT (deep venous thrombosis) (HCC)    • History of blood clots right leg      june 23 2020   • Hypertension    • PONV (postoperative nausea and vomiting)     nausea after shoulder surgery    • Venous stasis dermatitis of right lower extremity 06/24/2020      Past Surgical History:   Procedure Laterality Date   • APPENDECTOMY     • COLONOSCOPY  11/19/2016   • ESOPHAGOGASTRODUODENOSCOPY     • ESOPHAGOGASTRODUODENOSCOPY N/A 10/26/2016    Procedure: ESOPHAGOGASTRODUODENOSCOPY (EGD); Surgeon: Bladimir Del Real MD;  Location: [facility]; Service:    • RI COLONOSCOPY FLX DX W/COLLJ SPEC WHEN PFRMD N/A 11/19/2016    Procedure: COLONOSCOPY;  Surgeon: Yelitza Bolton MD;  Location[facility]; Service: Gastroenterology   • RI EGD BALLOON DILATION ESOPHAGUS <30 MM DIAM N/A 10/26/2016    Procedure: Jerrell Vega ;  Surgeon: Bladimir Del Real MD;  Location: [facility];   Service: Gastroenterology   • RI SURGICAL ARTHROSCOPY SHOULDER W/ROTATOR CUFF RPR Left 04/21/2016    Procedure: LEFT SHOULDER ARTHROSCOPIC ROTATOR CUFF REPAIR, SUBACROMIAL DECOMPRESSION, BICEPS TENODESIS;  Surgeon: Danyelle Bashir MD;  [facility];  Service: Orthopedics   • RI TENDON SHEATH INCISION Right 11/15/2016    Procedure: INDEX TRIGGER FINGER RELEASE ;  Surgeon: Wilfred Dove MD;  [facility];  Service: Orthopedics     Social History     Socioeconomic History   • Marital status: /Civil Union     Spouse name: Not on file   • Number of children: Not on file   • Years of education: Not on file   • Highest education level: Not on file   Occupational History   • Not on file   Tobacco Use   • Smoking status: Never   • Smokeless tobacco: Never   Vaping Use   • Vaping Use: Never used   Substance and Sexual Activity   • Alcohol use: No   • Drug use: No   • Sexual activity: Not on file   Other Topics Concern   • Not on file   Social History Narrative   • Not on file     Social Determinants of Health     Financial Resource Strain: Not on file   Food Insecurity: Not on file   Transportation Needs: Not on file   Physical Activity: Not on file   Stress: Not on file   Social Connections: Not on file   Intimate Partner Violence: Not on file   Housing Stability: Not on file     History reviewed. No pertinent family history. Tetanus toxoid, Tetanus toxoids, and Fenofibrate  Current Outpatient Medications   Medication Sig Dispense Refill   • bisacodyl (DULCOLAX) 5 mg EC tablet Take 2 tablets (10 mg total) by mouth once for 1 dose 2 tablet 0   • lisinopril-hydrochlorothiazide (PRINZIDE,ZESTORETIC) 20-25 MG per tablet Take 1 tablet by mouth daily. • Multiple Vitamins-Minerals (CENTRUM SILVER PO) Take 1 tablet by mouth daily. • pioglitazone-metFORMIN (ACTOPLUS MET)  MG per tablet Take 1 tablet by mouth daily. • polyethylene glycol (GOLYTELY) 4000 mL solution Take 4,000 mL by mouth once for 1 dose 4000 mL 0   • rosuvastatin (CRESTOR) 20 MG tablet      • tadalafil (CIALIS) 5 MG tablet Take 5 mg by mouth daily as needed for erectile dysfunction. • Tricor 145 MG tablet Take 145 mg by mouth daily     • aspirin (ECOTRIN LOW STRENGTH) 81 mg EC tablet Take 1 tablet (81 mg total) by mouth daily (Patient not taking: Reported on 10/10/2023) 90 tablet 3   • gabapentin (NEURONTIN) 300 mg capsule Take 1 capsule (300 mg total) by mouth daily at bedtime (Patient not taking: Reported on 10/25/2022) 90 capsule 1     No current facility-administered medications for this visit. Blood pressure 135/77, pulse 78, height 6' 1" (1.854 m), weight 122 kg (268 lb), SpO2 97 %. PHYSICAL EXAM: Physical Exam  Constitutional:       Appearance: He is well-developed. HENT:      Head: Normocephalic and atraumatic. Eyes:      General: No scleral icterus. Right eye: No discharge. Left eye: No discharge. Conjunctiva/sclera: Conjunctivae normal.      Pupils: Pupils are equal, round, and reactive to light. Neck:      Thyroid: No thyromegaly. Vascular: No JVD. Trachea: No tracheal deviation. Cardiovascular:      Rate and Rhythm: Normal rate and regular rhythm. Heart sounds: Normal heart sounds. No murmur heard. No friction rub. No gallop. Pulmonary:      Effort: Pulmonary effort is normal. No respiratory distress. Breath sounds: Normal breath sounds. No wheezing or rales. Chest:      Chest wall: No tenderness. Abdominal:      General: Bowel sounds are normal. There is no distension. Palpations: Abdomen is soft. There is no mass. Tenderness: There is no abdominal tenderness. There is no guarding or rebound. Hernia: No hernia is present. Musculoskeletal:      Cervical back: Neck supple. Lymphadenopathy:      Cervical: No cervical adenopathy. Skin:     General: Skin is warm and dry. Findings: No erythema or rash. Neurological:      Mental Status: He is alert and oriented to person, place, and time. Psychiatric:         Behavior: Behavior normal.         Thought Content: Thought content normal.          Lab Results   Component Value Date    WBC 7.38 06/23/2020    HGB 13.2 06/23/2020    HCT 39.6 06/23/2020    MCV 92 06/23/2020     06/23/2020     Lab Results   Component Value Date    CALCIUM 9.3 06/23/2020    K 3.8 06/23/2020    CO2 26 06/23/2020     06/23/2020    BUN 29 (H) 06/23/2020    CREATININE 1.11 06/23/2020     No results found for: "ALT", "AST", "GGT", "ALKPHOS", "BILITOT"  No results found for: "INR", "PROTIME"    No results found. ASSESSMENT & PLAN:    Other constipation  Probable physiologic changes. Rule out colorectal lesions including polyps or malignancy. -Advised about stool softeners, MiraLAX, fiber supplements. Increase oral hydration.    -Avoid straining during defecation    -Schedule for colonoscopy. -High-fiber diet.    -Patient was given instructions about the colonoscopy prep.    -Patient was explained about the risks and benefits of the procedure.  Risks including but not limited to bleeding, infection, perforation were explained in detail. Also explained about less than 100% sensitivity with the exam and other alternatives. BRBPR (bright red blood per rectum)  Bleeding from hemorrhoids most likely. Rule out any other lesions.    -Treatment plan as described above    Dysphagia  Probable peptic stricture.   Patient reports having dilations in the past.    -Advised to chew well before swallowing    -Schedule EGD

## 2023-10-10 NOTE — ASSESSMENT & PLAN NOTE
Probable peptic stricture.   Patient reports having dilations in the past.    -Advised to chew well before swallowing    -Schedule EGD

## 2023-10-10 NOTE — ASSESSMENT & PLAN NOTE
Bleeding from hemorrhoids most likely.   Rule out any other lesions.    -Treatment plan as described above

## 2023-10-18 NOTE — PATIENT INSTRUCTIONS
Scheduled date of EGD/colonoscopy (as of today): 11/27/23  Physician performing EGD/colonoscopy: Dr. Joy Krueger  Location of EGD/colonoscopy: [address] [place] [address],[address]  Desired bowel prep reviewed with patient: golytely  Instructions reviewed with patient by: Yamini Albrecht  Clearances: diabetic

## 2023-11-16 ENCOUNTER — RA CDI HCC (OUTPATIENT)
Dept: OTHER | Facility: HOSPITAL | Age: 68
End: 2023-11-16

## 2023-11-16 NOTE — PROGRESS NOTES
[address] [facility] coding opportunities       Chart reviewed, no opportunity found: CHART REVIEWED, NO OPPORTUNITY FOUND        Patients Insurance        Commercial Insurance: Jaime Phillips

## 2024-07-26 ENCOUNTER — APPOINTMENT (EMERGENCY)
Dept: RADIOLOGY | Facility: HOSPITAL | Age: 69
DRG: 948 | End: 2024-07-26
Payer: COMMERCIAL

## 2024-07-26 ENCOUNTER — APPOINTMENT (INPATIENT)
Dept: NON INVASIVE DIAGNOSTICS | Facility: HOSPITAL | Age: 69
DRG: 948 | End: 2024-07-26
Payer: COMMERCIAL

## 2024-07-26 ENCOUNTER — HOSPITAL ENCOUNTER (INPATIENT)
Facility: HOSPITAL | Age: 69
LOS: 1 days | Discharge: HOME | DRG: 948 | End: 2024-07-27
Attending: EMERGENCY MEDICINE | Admitting: STUDENT IN AN ORGANIZED HEALTH CARE EDUCATION/TRAINING PROGRAM
Payer: COMMERCIAL

## 2024-07-26 DIAGNOSIS — I87.2 VENOUS STASIS DERMATITIS OF RIGHT LOWER EXTREMITY: ICD-10-CM

## 2024-07-26 DIAGNOSIS — I21.4 NSTEMI (NON-ST ELEVATED MYOCARDIAL INFARCTION) (HCC): Primary | ICD-10-CM

## 2024-07-26 DIAGNOSIS — I82.411 ACUTE DEEP VEIN THROMBOSIS (DVT) OF RIGHT FEMORAL VEIN (HCC): ICD-10-CM

## 2024-07-26 DIAGNOSIS — E11.9 TYPE 2 DIABETES MELLITUS WITHOUT COMPLICATION, WITHOUT LONG-TERM CURRENT USE OF INSULIN (HCC): ICD-10-CM

## 2024-07-26 PROBLEM — I10 PRIMARY HYPERTENSION: Status: ACTIVE | Noted: 2024-07-26

## 2024-07-26 PROBLEM — R79.89 ELEVATED TROPONIN: Status: ACTIVE | Noted: 2024-07-26

## 2024-07-26 PROBLEM — M54.50 CHRONIC BILATERAL LOW BACK PAIN WITHOUT SCIATICA: Status: ACTIVE | Noted: 2024-07-26

## 2024-07-26 PROBLEM — G89.29 CHRONIC BILATERAL LOW BACK PAIN WITHOUT SCIATICA: Status: ACTIVE | Noted: 2024-07-26

## 2024-07-26 PROBLEM — Z86.718 HISTORY OF DVT (DEEP VEIN THROMBOSIS): Status: ACTIVE | Noted: 2020-06-24

## 2024-07-26 PROBLEM — R01.1 CARDIAC MURMUR: Status: ACTIVE | Noted: 2024-07-26

## 2024-07-26 PROBLEM — E66.9 OBESITY (BMI 35.0-39.9 WITHOUT COMORBIDITY): Status: ACTIVE | Noted: 2024-07-26

## 2024-07-26 LAB
2HR DELTA HS TROPONIN: 151 NG/L
4HR DELTA HS TROPONIN: 182 NG/L
ALBUMIN SERPL BCG-MCNC: 4.1 G/DL (ref 3.5–5)
ALP SERPL-CCNC: 48 U/L (ref 34–104)
ALT SERPL W P-5'-P-CCNC: 19 U/L (ref 7–52)
ANION GAP SERPL CALCULATED.3IONS-SCNC: 11 MMOL/L (ref 4–13)
AORTIC ROOT: 3.8 CM
AORTIC VALVE MEAN VELOCITY: 18.1 M/S
APICAL FOUR CHAMBER EJECTION FRACTION: 66 %
ASCENDING AORTA: 4 CM
AST SERPL W P-5'-P-CCNC: 19 U/L (ref 13–39)
ATRIAL RATE: 87 BPM
AV AREA BY CONTINUOUS VTI: 1.8 CM2
AV AREA PEAK VELOCITY: 1.7 CM2
AV LVOT MEAN GRADIENT: 2 MMHG
AV LVOT PEAK GRADIENT: 4 MMHG
AV MEAN GRADIENT: 15 MMHG
AV PEAK GRADIENT: 27 MMHG
AV VALVE AREA: 1.83 CM2
AV VELOCITY RATIO: 0.38
BASOPHILS # BLD AUTO: 0.02 THOUSANDS/ÂΜL (ref 0–0.1)
BASOPHILS NFR BLD AUTO: 0 % (ref 0–1)
BILIRUB SERPL-MCNC: 0.74 MG/DL (ref 0.2–1)
BSA FOR ECHO PROCEDURE: 2.44 M2
BUN SERPL-MCNC: 33 MG/DL (ref 5–25)
CALCIUM SERPL-MCNC: 9.7 MG/DL (ref 8.4–10.2)
CARDIAC TROPONIN I PNL SERPL HS: 234 NG/L
CARDIAC TROPONIN I PNL SERPL HS: 265 NG/L
CARDIAC TROPONIN I PNL SERPL HS: 83 NG/L
CHLORIDE SERPL-SCNC: 104 MMOL/L (ref 96–108)
CHOLEST SERPL-MCNC: 115 MG/DL
CO2 SERPL-SCNC: 21 MMOL/L (ref 21–32)
CREAT SERPL-MCNC: 1.24 MG/DL (ref 0.6–1.3)
DOP CALC AO PEAK VEL: 2.6 M/S
DOP CALC AO VTI: 57.16 CM
DOP CALC LVOT AREA: 4.52 CM2
DOP CALC LVOT CARDIAC INDEX: 2.69 L/MIN/M2
DOP CALC LVOT CARDIAC OUTPUT: 6.57 L/MIN
DOP CALC LVOT DIAMETER: 2.4 CM
DOP CALC LVOT PEAK VEL VTI: 23.08 CM
DOP CALC LVOT PEAK VEL: 0.98 M/S
DOP CALC LVOT STROKE INDEX: 43.4 ML/M2
DOP CALC LVOT STROKE VOLUME: 104.36
E WAVE DECELERATION TIME: 263 MS
E/A RATIO: 0.77
EOSINOPHIL # BLD AUTO: 0.06 THOUSAND/ÂΜL (ref 0–0.61)
EOSINOPHIL NFR BLD AUTO: 1 % (ref 0–6)
ERYTHROCYTE [DISTWIDTH] IN BLOOD BY AUTOMATED COUNT: 14.1 % (ref 11.6–15.1)
ERYTHROCYTE [DISTWIDTH] IN BLOOD BY AUTOMATED COUNT: 14.2 % (ref 11.6–15.1)
EST. AVERAGE GLUCOSE BLD GHB EST-MCNC: 183 MG/DL
FRACTIONAL SHORTENING: 35 (ref 28–44)
GFR SERPL CREATININE-BSD FRML MDRD: 59 ML/MIN/1.73SQ M
GLUCOSE SERPL-MCNC: 140 MG/DL (ref 65–140)
GLUCOSE SERPL-MCNC: 165 MG/DL (ref 65–140)
GLUCOSE SERPL-MCNC: 184 MG/DL (ref 65–140)
GLUCOSE SERPL-MCNC: 253 MG/DL (ref 65–140)
GLUCOSE SERPL-MCNC: 259 MG/DL (ref 65–140)
HBA1C MFR BLD: 8 %
HCT VFR BLD AUTO: 32.9 % (ref 36.5–49.3)
HCT VFR BLD AUTO: 37.7 % (ref 36.5–49.3)
HDLC SERPL-MCNC: 50 MG/DL
HGB BLD-MCNC: 11 G/DL (ref 12–17)
HGB BLD-MCNC: 12.8 G/DL (ref 12–17)
IMM GRANULOCYTES # BLD AUTO: 0.03 THOUSAND/UL (ref 0–0.2)
IMM GRANULOCYTES NFR BLD AUTO: 0 % (ref 0–2)
INTERVENTRICULAR SEPTUM IN DIASTOLE (PARASTERNAL SHORT AXIS VIEW): 1.2 CM
INTERVENTRICULAR SEPTUM: 1.2 CM (ref 0.6–1.1)
LAAS-AP2: 23.9 CM2
LAAS-AP4: 26 CM2
LDLC SERPL CALC-MCNC: 49 MG/DL (ref 0–100)
LEFT ATRIUM AREA SYSTOLE SINGLE PLANE A4C: 25.9 CM2
LEFT ATRIUM SIZE: 4.4 CM
LEFT ATRIUM VOLUME (MOD BIPLANE): 82 ML
LEFT ATRIUM VOLUME INDEX (MOD BIPLANE): 33.6 ML/M2
LEFT INTERNAL DIMENSION IN SYSTOLE: 3.3 CM (ref 2.1–4)
LEFT VENTRICULAR INTERNAL DIMENSION IN DIASTOLE: 5.1 CM (ref 3.5–6)
LEFT VENTRICULAR POSTERIOR WALL IN END DIASTOLE: 1.2 CM
LEFT VENTRICULAR STROKE VOLUME: 80 ML
LVSV (TEICH): 80 ML
LYMPHOCYTES # BLD AUTO: 0.85 THOUSANDS/ÂΜL (ref 0.6–4.47)
LYMPHOCYTES NFR BLD AUTO: 12 % (ref 14–44)
MCH RBC QN AUTO: 30.5 PG (ref 26.8–34.3)
MCH RBC QN AUTO: 30.6 PG (ref 26.8–34.3)
MCHC RBC AUTO-ENTMCNC: 33.4 G/DL (ref 31.4–37.4)
MCHC RBC AUTO-ENTMCNC: 34 G/DL (ref 31.4–37.4)
MCV RBC AUTO: 90 FL (ref 82–98)
MCV RBC AUTO: 91 FL (ref 82–98)
MONOCYTES # BLD AUTO: 0.43 THOUSAND/ÂΜL (ref 0.17–1.22)
MONOCYTES NFR BLD AUTO: 6 % (ref 4–12)
MV E'TISSUE VEL-SEP: 9 CM/S
MV PEAK A VEL: 1.19 M/S
MV PEAK E VEL: 92 CM/S
MV STENOSIS PRESSURE HALF TIME: 76 MS
MV VALVE AREA P 1/2 METHOD: 2.89
NEUTROPHILS # BLD AUTO: 5.96 THOUSANDS/ÂΜL (ref 1.85–7.62)
NEUTS SEG NFR BLD AUTO: 81 % (ref 43–75)
NRBC BLD AUTO-RTO: 0 /100 WBCS
P AXIS: 50 DEGREES
PLATELET # BLD AUTO: 263 THOUSANDS/UL (ref 149–390)
PLATELET # BLD AUTO: 312 THOUSANDS/UL (ref 149–390)
PMV BLD AUTO: 9.6 FL (ref 8.9–12.7)
PMV BLD AUTO: 9.8 FL (ref 8.9–12.7)
POTASSIUM SERPL-SCNC: 3.8 MMOL/L (ref 3.5–5.3)
PR INTERVAL: 156 MS
PROT SERPL-MCNC: 6.8 G/DL (ref 6.4–8.4)
QRS AXIS: -15 DEGREES
QRSD INTERVAL: 106 MS
QT INTERVAL: 386 MS
QTC INTERVAL: 464 MS
RBC # BLD AUTO: 3.61 MILLION/UL (ref 3.88–5.62)
RBC # BLD AUTO: 4.18 MILLION/UL (ref 3.88–5.62)
RIGHT ATRIAL 2D VOLUME: 41 ML
RIGHT ATRIUM AREA SYSTOLE A4C: 16.3 CM2
RIGHT VENTRICLE ID DIMENSION: 4.2 CM
SL CV LEFT ATRIUM LENGTH A2C: 6.1 CM
SL CV LV EF: 65
SL CV PED ECHO LEFT VENTRICLE DIASTOLIC VOLUME (MOD BIPLANE) 2D: 125 ML
SL CV PED ECHO LEFT VENTRICLE SYSTOLIC VOLUME (MOD BIPLANE) 2D: 45 ML
SODIUM SERPL-SCNC: 136 MMOL/L (ref 135–147)
T WAVE AXIS: 46 DEGREES
TRICUSPID ANNULAR PLANE SYSTOLIC EXCURSION: 2.9 CM
TRIGL SERPL-MCNC: 79 MG/DL
VENTRICULAR RATE: 87 BPM
WBC # BLD AUTO: 5.51 THOUSAND/UL (ref 4.31–10.16)
WBC # BLD AUTO: 7.35 THOUSAND/UL (ref 4.31–10.16)

## 2024-07-26 PROCEDURE — 84484 ASSAY OF TROPONIN QUANT: CPT

## 2024-07-26 PROCEDURE — 85025 COMPLETE CBC W/AUTO DIFF WBC: CPT

## 2024-07-26 PROCEDURE — 99152 MOD SED SAME PHYS/QHP 5/>YRS: CPT | Performed by: INTERNAL MEDICINE

## 2024-07-26 PROCEDURE — 93454 CORONARY ARTERY ANGIO S&I: CPT | Performed by: INTERNAL MEDICINE

## 2024-07-26 PROCEDURE — 93005 ELECTROCARDIOGRAM TRACING: CPT

## 2024-07-26 PROCEDURE — 93306 TTE W/DOPPLER COMPLETE: CPT

## 2024-07-26 PROCEDURE — 93306 TTE W/DOPPLER COMPLETE: CPT | Performed by: INTERNAL MEDICINE

## 2024-07-26 PROCEDURE — 85027 COMPLETE CBC AUTOMATED: CPT | Performed by: INTERNAL MEDICINE

## 2024-07-26 PROCEDURE — 83036 HEMOGLOBIN GLYCOSYLATED A1C: CPT | Performed by: NURSE PRACTITIONER

## 2024-07-26 PROCEDURE — 84484 ASSAY OF TROPONIN QUANT: CPT | Performed by: INTERNAL MEDICINE

## 2024-07-26 PROCEDURE — C1894 INTRO/SHEATH, NON-LASER: HCPCS | Performed by: INTERNAL MEDICINE

## 2024-07-26 PROCEDURE — C1769 GUIDE WIRE: HCPCS | Performed by: INTERNAL MEDICINE

## 2024-07-26 PROCEDURE — 99285 EMERGENCY DEPT VISIT HI MDM: CPT | Performed by: EMERGENCY MEDICINE

## 2024-07-26 PROCEDURE — C1760 CLOSURE DEV, VASC: HCPCS | Performed by: INTERNAL MEDICINE

## 2024-07-26 PROCEDURE — 82948 REAGENT STRIP/BLOOD GLUCOSE: CPT

## 2024-07-26 PROCEDURE — 80053 COMPREHEN METABOLIC PANEL: CPT

## 2024-07-26 PROCEDURE — 99223 1ST HOSP IP/OBS HIGH 75: CPT | Performed by: INTERNAL MEDICINE

## 2024-07-26 PROCEDURE — 99153 MOD SED SAME PHYS/QHP EA: CPT | Performed by: INTERNAL MEDICINE

## 2024-07-26 PROCEDURE — 93010 ELECTROCARDIOGRAM REPORT: CPT | Performed by: INTERNAL MEDICINE

## 2024-07-26 PROCEDURE — B2111ZZ FLUOROSCOPY OF MULTIPLE CORONARY ARTERIES USING LOW OSMOLAR CONTRAST: ICD-10-PCS | Performed by: STUDENT IN AN ORGANIZED HEALTH CARE EDUCATION/TRAINING PROGRAM

## 2024-07-26 PROCEDURE — NC001 PR NO CHARGE: Performed by: INTERNAL MEDICINE

## 2024-07-26 PROCEDURE — 80061 LIPID PANEL: CPT | Performed by: NURSE PRACTITIONER

## 2024-07-26 PROCEDURE — 99285 EMERGENCY DEPT VISIT HI MDM: CPT

## 2024-07-26 PROCEDURE — 36415 COLL VENOUS BLD VENIPUNCTURE: CPT

## 2024-07-26 PROCEDURE — 71045 X-RAY EXAM CHEST 1 VIEW: CPT

## 2024-07-26 PROCEDURE — 96360 HYDRATION IV INFUSION INIT: CPT

## 2024-07-26 PROCEDURE — 96361 HYDRATE IV INFUSION ADD-ON: CPT

## 2024-07-26 PROCEDURE — B2111ZZ FLUOROSCOPY OF MULTIPLE CORONARY ARTERIES USING LOW OSMOLAR CONTRAST: ICD-10-PCS | Performed by: INTERNAL MEDICINE

## 2024-07-26 DEVICE — ANGIO-SEAL VIP VASCULAR CLOSURE DEVICE
Type: IMPLANTABLE DEVICE | Site: GROIN | Status: FUNCTIONAL
Brand: ANGIO-SEAL

## 2024-07-26 RX ORDER — ONDANSETRON 2 MG/ML
4 INJECTION INTRAMUSCULAR; INTRAVENOUS EVERY 6 HOURS PRN
Status: DISCONTINUED | OUTPATIENT
Start: 2024-07-26 | End: 2024-07-27 | Stop reason: HOSPADM

## 2024-07-26 RX ORDER — HYDROCHLOROTHIAZIDE 25 MG/1
25 TABLET ORAL DAILY
Status: DISCONTINUED | OUTPATIENT
Start: 2024-07-26 | End: 2024-07-27 | Stop reason: HOSPADM

## 2024-07-26 RX ORDER — FENTANYL CITRATE 50 UG/ML
INJECTION, SOLUTION INTRAMUSCULAR; INTRAVENOUS CODE/TRAUMA/SEDATION MEDICATION
Status: DISCONTINUED | OUTPATIENT
Start: 2024-07-26 | End: 2024-07-26 | Stop reason: HOSPADM

## 2024-07-26 RX ORDER — BISACODYL 5 MG/1
10 TABLET, DELAYED RELEASE ORAL DAILY PRN
Status: DISCONTINUED | OUTPATIENT
Start: 2024-07-26 | End: 2024-07-27 | Stop reason: HOSPADM

## 2024-07-26 RX ORDER — ASPIRIN 81 MG/1
81 TABLET, CHEWABLE ORAL DAILY
Status: DISCONTINUED | OUTPATIENT
Start: 2024-07-27 | End: 2024-07-26

## 2024-07-26 RX ORDER — DOCUSATE SODIUM 100 MG/1
100 CAPSULE, LIQUID FILLED ORAL 2 TIMES DAILY PRN
Status: DISCONTINUED | OUTPATIENT
Start: 2024-07-26 | End: 2024-07-27 | Stop reason: HOSPADM

## 2024-07-26 RX ORDER — INSULIN LISPRO 100 [IU]/ML
1-5 INJECTION, SOLUTION INTRAVENOUS; SUBCUTANEOUS
Status: DISCONTINUED | OUTPATIENT
Start: 2024-07-26 | End: 2024-07-27 | Stop reason: HOSPADM

## 2024-07-26 RX ORDER — ATORVASTATIN CALCIUM 40 MG/1
40 TABLET, FILM COATED ORAL
Status: DISCONTINUED | OUTPATIENT
Start: 2024-07-26 | End: 2024-07-27 | Stop reason: HOSPADM

## 2024-07-26 RX ORDER — SODIUM CHLORIDE 9 MG/ML
100 INJECTION, SOLUTION INTRAVENOUS CONTINUOUS
Status: DISCONTINUED | OUTPATIENT
Start: 2024-07-26 | End: 2024-07-26

## 2024-07-26 RX ORDER — LIDOCAINE HYDROCHLORIDE 10 MG/ML
INJECTION, SOLUTION EPIDURAL; INFILTRATION; INTRACAUDAL; PERINEURAL CODE/TRAUMA/SEDATION MEDICATION
Status: DISCONTINUED | OUTPATIENT
Start: 2024-07-26 | End: 2024-07-26 | Stop reason: HOSPADM

## 2024-07-26 RX ORDER — ENOXAPARIN SODIUM 100 MG/ML
40 INJECTION SUBCUTANEOUS DAILY
Status: DISCONTINUED | OUTPATIENT
Start: 2024-07-26 | End: 2024-07-27 | Stop reason: HOSPADM

## 2024-07-26 RX ORDER — ASPIRIN 325 MG
325 TABLET ORAL ONCE
Status: COMPLETED | OUTPATIENT
Start: 2024-07-26 | End: 2024-07-26

## 2024-07-26 RX ORDER — MIDAZOLAM HYDROCHLORIDE 2 MG/2ML
INJECTION, SOLUTION INTRAMUSCULAR; INTRAVENOUS CODE/TRAUMA/SEDATION MEDICATION
Status: DISCONTINUED | OUTPATIENT
Start: 2024-07-26 | End: 2024-07-26 | Stop reason: HOSPADM

## 2024-07-26 RX ORDER — LISINOPRIL 20 MG/1
20 TABLET ORAL DAILY
Status: DISCONTINUED | OUTPATIENT
Start: 2024-07-26 | End: 2024-07-27 | Stop reason: HOSPADM

## 2024-07-26 RX ORDER — FENOFIBRATE 145 MG/1
145 TABLET, COATED ORAL DAILY
Status: DISCONTINUED | OUTPATIENT
Start: 2024-07-26 | End: 2024-07-27 | Stop reason: HOSPADM

## 2024-07-26 RX ORDER — SODIUM CHLORIDE 9 MG/ML
125 INJECTION, SOLUTION INTRAVENOUS CONTINUOUS
Status: DISPENSED | OUTPATIENT
Start: 2024-07-26 | End: 2024-07-26

## 2024-07-26 RX ORDER — CALCIUM CARBONATE 500 MG/1
1000 TABLET, CHEWABLE ORAL DAILY PRN
Status: DISCONTINUED | OUTPATIENT
Start: 2024-07-26 | End: 2024-07-27 | Stop reason: HOSPADM

## 2024-07-26 RX ADMIN — INSULIN LISPRO 1 UNITS: 100 INJECTION, SOLUTION INTRAVENOUS; SUBCUTANEOUS at 21:56

## 2024-07-26 RX ADMIN — TICAGRELOR 180 MG: 90 TABLET ORAL at 16:06

## 2024-07-26 RX ADMIN — SODIUM CHLORIDE 1000 ML: 0.9 INJECTION, SOLUTION INTRAVENOUS at 10:54

## 2024-07-26 RX ADMIN — ASPIRIN 325 MG ORAL TABLET 325 MG: 325 PILL ORAL at 14:01

## 2024-07-26 RX ADMIN — SODIUM CHLORIDE 125 ML/HR: 0.9 INJECTION, SOLUTION INTRAVENOUS at 17:03

## 2024-07-26 NOTE — ASSESSMENT & PLAN NOTE
Lab Results   Component Value Date    HGBA1C 6.3 (H) 12/22/2018       Recent Labs     07/26/24  1031   POCGLU 253*       Blood Sugar Average: Last 72 hrs:  (P) 253  Takes metformin and pioglitazone  Insulin sliding scale while inpatient

## 2024-07-26 NOTE — ED PROVIDER NOTES
History  Chief Complaint   Patient presents with    Chest Pain     CP that started in back and now in center of chest. 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 Also reports dizziness and lightheadedness. Hx of diabetes, no cardiac hx.      HPI    67 yo M with history of hypertension, diabetes presenting with lightheadedness and chest pain.  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  He did sit down to rest and now feels pain has somewhat improved, but has not had pain like this previously.  He also recounts feeling a little bit short of breath and lightheaded at that time.    Prior to Admission Medications   Prescriptions Last Dose Informant Patient Reported? Taking?   Multiple Vitamins-Minerals (CENTRUM SILVER PO)  Self Yes No   Sig: Take 1 tablet by mouth daily.   Tricor 145 MG tablet  Self Yes No   Sig: Take 145 mg by mouth daily   aspirin (ECOTRIN LOW STRENGTH) 81 mg EC tablet  Self No No   Sig: Take 1 tablet (81 mg total) by mouth daily   Patient not taking: Reported on 10/10/2023   bisacodyl (DULCOLAX) 5 mg EC tablet   No No   Sig: Take 2 tablets (10 mg total) by mouth once for 1 dose   gabapentin (NEURONTIN) 300 mg capsule   No No   Sig: Take 1 capsule (300 mg total) by mouth daily at bedtime   Patient not taking: Reported on 10/25/2022   lisinopril-hydrochlorothiazide (PRINZIDE,ZESTORETIC) 20-25 MG per tablet  Self Yes No   Sig: Take 1 tablet by mouth daily.   pioglitazone-metFORMIN (ACTOPLUS MET)  MG per tablet  Self Yes No   Sig: Take 1 tablet by mouth daily.   polyethylene glycol (GOLYTELY) 4000 mL solution   No No   Sig: Take 4,000 mL by mouth once for 1 dose   rosuvastatin (CRESTOR) 20 MG tablet  Self Yes No   tadalafil (CIALIS) 5 MG tablet  Self Yes No   Sig: Take 5 mg by mouth daily as needed for erectile dysfunction.      Facility-Administered Medications: None       Past Medical  History:   Diagnosis Date    Diabetes mellitus (HCC)     boaderline    DVT (deep venous thrombosis) ([facility])     History of blood clots right leg      june 23 2020    Hypertension     PONV (postoperative nausea and vomiting)     nausea after shoulder surgery     Venous stasis dermatitis of right lower extremity 06/24/2020       Past Surgical History:   Procedure Laterality Date    APPENDECTOMY      COLONOSCOPY  11/19/2016    ESOPHAGOGASTRODUODENOSCOPY      ESOPHAGOGASTRODUODENOSCOPY N/A 10/26/2016    Procedure: ESOPHAGOGASTRODUODENOSCOPY (EGD);  Surgeon: Tito Rutherford MD;  Location: [facility];  Service:     KY COLONOSCOPY FLX DX W/COLLJ SPEC WHEN PFRMD N/A 11/19/2016    Procedure: COLONOSCOPY;  Surgeon: Sandor Hay MD;  [facility]: [facility];  Service: Gastroenterology    KY EGD BALLOON DILATION ESOPHAGUS <30 MM DIAM N/A 10/26/2016    Procedure: BALLOON DILATION ;  Surgeon: Tito Rutherford MD;  Location: [facility];  Service: Gastroenterology    KY SURGICAL ARTHROSCOPY SHOULDER W/ROTATOR CUFF RPR Left 04/21/2016    Procedure: LEFT SHOULDER ARTHROSCOPIC ROTATOR CUFF REPAIR, SUBACROMIAL DECOMPRESSION, BICEPS TENODESIS;  Surgeon: Amado Avelar MD;  [facility];  Service: Orthopedics    KY TENDON SHEATH INCISION Right 11/15/2016    Procedure: INDEX TRIGGER FINGER RELEASE ;  Surgeon: Todd Silva MD;  [facility];  Service: Orthopedics       History reviewed. No pertinent family history.  I have reviewed and agree with the history as documented.    E-Cigarette/Vaping    E-Cigarette Use Never User      E-Cigarette/Vaping Substances    Nicotine No     THC No     CBD No     Flavoring No     Other No     Unknown No      Social History     Tobacco Use    Smoking status: Never    Smokeless tobacco: Never   Vaping Use    Vaping status: Never Used   Substance Use Topics    Alcohol use: No    Drug use: No        Review of Systems   Constitutional:  Negative for chills and fever.   HENT:  Negative for ear pain and  sore throat.    Eyes:  Negative for pain and visual disturbance.   Respiratory:  Positive for shortness of breath. Negative for cough.    Cardiovascular:  Positive for chest pain. Negative for palpitations.   Gastrointestinal:  Negative for abdominal pain and vomiting.   Genitourinary:  Negative for dysuria and hematuria.   Musculoskeletal:  Negative for arthralgias and back pain.   Skin:  Negative for color change and rash.   Neurological:  Positive for light-headedness. Negative for seizures and syncope.   All other systems reviewed and are negative.      Physical Exam  ED Triage Vitals   Temperature Pulse Respirations Blood Pressure SpO2   07/26/24 1027 07/26/24 1026 07/26/24 1026 07/26/24 1026 07/26/24 1026   (!) 97.4 °F (36.3 °C) 93 18 102/63 97 %      Temp Source Heart Rate Source Patient Position - Orthostatic VS BP Location FiO2 (%)   07/26/24 1026 07/26/24 1026 07/26/24 1247 07/26/24 1026 --   Oral Monitor Lying Right arm       Pain Score       07/26/24 1026       3             Orthostatic Vital Signs  Vitals:    07/26/24 1330 07/26/24 1400 07/26/24 1500 07/26/24 1536   BP: 116/57 127/74 127/74 137/67   Pulse: 64 66 55 63   Patient Position - Orthostatic VS:           Physical Exam  Vitals and nursing note reviewed.   Constitutional:       General: He is not in acute distress.     Appearance: He is well-developed.   HENT:      Head: Normocephalic and atraumatic.   Eyes:      Conjunctiva/sclera: Conjunctivae normal.   Cardiovascular:      Rate and Rhythm: Normal rate and regular rhythm.      Heart sounds: Murmur heard.   Pulmonary:      Effort: Pulmonary effort is normal. No respiratory distress.      Breath sounds: Normal breath sounds.   Abdominal:      Palpations: Abdomen is soft.      Tenderness: There is no abdominal tenderness.   Musculoskeletal:         General: No swelling.      Right lower leg: Edema present.      Left lower leg: Edema present.   Skin:     General: Skin is warm and dry.    Neurological:      Mental Status: He is alert.   Psychiatric:         Mood and Affect: Mood normal.         ED Medications  Medications   insulin lispro (HumALOG/ADMELOG) 100 units/mL subcutaneous injection 1-5 Units (has no administration in time range)   insulin lispro (HumALOG/ADMELOG) 100 units/mL subcutaneous injection 1-5 Units (has no administration in time range)   bisacodyl (DULCOLAX) EC tablet 10 mg (has no administration in time range)   lisinopril (ZESTRIL) tablet 20 mg (20 mg Oral Not Given 7/26/24 1605)     And   hydroCHLOROthiazide tablet 25 mg (0 mg Oral Hold 7/26/24 1605)   atorvastatin (LIPITOR) tablet 40 mg (40 mg Oral Not Given 7/26/24 1607)   fenofibrate (TRICOR) tablet 145 mg (145 mg Oral Not Given 7/26/24 1606)   calcium carbonate (TUMS) chewable tablet 1,000 mg (has no administration in time range)   docusate sodium (COLACE) capsule 100 mg (has no administration in time range)   ondansetron (ZOFRAN) injection 4 mg (has no administration in time range)   enoxaparin (LOVENOX) subcutaneous injection 40 mg (has no administration in time range)   aspirin (ECOTRIN LOW STRENGTH) EC tablet 81 mg (has no administration in time range)   midazolam (VERSED) injection (2 mg Intravenous Given 7/26/24 1625)   fentaNYL injection (50 mcg Intravenous Given 7/26/24 1625)   lidocaine (PF) (XYLOCAINE-MPF) 1 % injection (10 mL Infiltration Given 7/26/24 1636)   sodium chloride 0.9 % bolus 1,000 mL (0 mL Intravenous Stopped 7/26/24 1402)   aspirin tablet 325 mg (325 mg Oral Given 7/26/24 1401)   ticagrelor (BRILINTA) tablet 180 mg (180 mg Oral Given 7/26/24 1606)       Diagnostic Studies  Results Reviewed       Procedure Component Value Units Date/Time    HS Troponin I 4hr [[id number]]  (Abnormal) Collected: 07/26/24 1512    Lab Status: Final result Specimen: Blood from Arm, Right Updated: 07/26/24 1542     hs TnI 4hr 265 ng/L      Delta 4hr hsTnI 182 ng/L     Hemoglobin A1C w/ EAG Estimation [[id number]]   (Abnormal) Collected: 07/26/24 1052    Lab Status: Final result Specimen: Blood from Arm, Right Updated: 07/26/24 1519     Hemoglobin A1C 8.0 %       mg/dl     Lipid Panel with Direct LDL reflex [[id number]]  (Normal) Collected: 07/26/24 1052    Lab Status: Final result Specimen: Blood from Arm, Right Updated: 07/26/24 1512     Cholesterol 115 mg/dL      Triglycerides 79 mg/dL      HDL, Direct 50 mg/dL      LDL Calculated 49 mg/dL     Platelet count [[id number]]     Lab Status: No result Specimen: Blood     HS Troponin I 2hr [[id number]]  (Abnormal) Collected: 07/26/24 1246    Lab Status: Final result Specimen: Blood from Arm, Right Updated: 07/26/24 1316     hs TnI 2hr 234 ng/L      Delta 2hr hsTnI 151 ng/L     Comprehensive metabolic panel [[id number]]  (Abnormal) Collected: 07/26/24 1052    Lab Status: Final result Specimen: Blood from Arm, Right Updated: 07/26/24 1128     Sodium 136 mmol/L      Potassium 3.8 mmol/L      Chloride 104 mmol/L      CO2 21 mmol/L      ANION GAP 11 mmol/L      BUN 33 mg/dL      Creatinine 1.24 mg/dL      Glucose 259 mg/dL      Calcium 9.7 mg/dL      AST 19 U/L      ALT 19 U/L      Alkaline Phosphatase 48 U/L      Total Protein 6.8 g/dL      Albumin 4.1 g/dL      Total Bilirubin 0.74 mg/dL      eGFR 59 ml/min/1.73sq m     Narrative:      [employer] guidelines for Chronic Kidney Disease (CKD):     Stage 1 with normal or high GFR (GFR > 90 mL/min/1.73 square meters)    Stage 2 Mild CKD (GFR = 60-89 mL/min/1.73 square meters)    Stage 3A Moderate CKD (GFR = 45-59 mL/min/1.73 square meters)    Stage 3B Moderate CKD (GFR = 30-44 mL/min/1.73 square meters)    Stage 4 Severe CKD (GFR = 15-29 mL/min/1.73 square meters)    Stage 5 End Stage CKD (GFR <15 mL/min/1.73 square meters)  Note: GFR calculation is accurate only with a steady state creatinine    HS Troponin 0hr (reflex protocol) [[id number]]  (Abnormal) Collected: 07/26/24 8938    Lab Status: Final result  Specimen: Blood from Arm, Right Updated: 07/26/24 1122     hs TnI 0hr 83 ng/L     CBC and differential [[id number]]  (Abnormal) Collected: 07/26/24 1052    Lab Status: Final result Specimen: Blood from Arm, Right Updated: 07/26/24 1101     WBC 7.35 Thousand/uL      RBC 4.18 Million/uL      Hemoglobin 12.8 g/dL      Hematocrit 37.7 %      MCV 90 fL      MCH 30.6 pg      MCHC 34.0 g/dL      RDW 14.2 %      MPV 9.8 fL      Platelets 312 Thousands/uL      nRBC 0 /100 WBCs      Segmented % 81 %      Immature Grans % 0 %      Lymphocytes % 12 %      Monocytes % 6 %      Eosinophils Relative 1 %      Basophils Relative 0 %      Absolute Neutrophils 5.96 Thousands/µL      Absolute Immature Grans 0.03 Thousand/uL      Absolute Lymphocytes 0.85 Thousands/µL      Absolute Monocytes 0.43 Thousand/µL      Eosinophils Absolute 0.06 Thousand/µL      Basophils Absolute 0.02 Thousands/µL     Fingerstick Glucose (POCT) [[id number]]  (Abnormal) Collected: 07/26/24 1031    Lab Status: Final result Specimen: Blood Updated: 07/26/24 1032     POC Glucose 253 mg/dl                    XR chest portable   Final Result by Jagdish Parada MD (07/26 1441)      No acute cardiopulmonary disease.            Resident: Edinson Rivera I, the attending radiologist, have reviewed the images and agree with the final report above.      Workstation performed: [id number]               Procedures  ECG 12 Lead Documentation Only    Date/Time: 7/26/2024 12:51 PM    Performed by: Tiesha Briceno DO  Authorized by: Tiesha Briceno DO    Indications / Diagnosis:  Chest pain  ECG reviewed by me, the ED Provider: yes    Patient location:  ED  Previous ECG:     Previous ECG:  Compared to current    Similarity:  No change  Interpretation:     Interpretation: normal    Rate:     ECG rate:  65    ECG rate assessment: normal    Rhythm:     Rhythm: sinus rhythm    Ectopy:     Ectopy: none    QRS:     QRS axis:  Left  Conduction:      Conduction: normal    ST segments:     ST segments:  Normal  T waves:     T waves: normal          ED Course  ED Course as of 07/26/24 1645   Fri Jul 26, 2024   1126 hs TnI 0hr(!): 83  Await 2h value   1324 hs TnI 2hr(!): 234                               VÍCTOR Risk Score      Flowsheet Row Most Recent Value   Age >= 65 1 Filed at: 07/26/2024 1438   Known CAD (stenosis >= 50%) 0 Filed at: 07/26/2024 1438   Recent (<=24 hrs) Service Angina 1 Filed at: 07/26/2024 1438   ST Deviation >= 0.5 mm 0 Filed at: 07/26/2024 1438   3+ CAD Risk Factors (FHx, HTN, HLP, DM, Smoker) 1 Filed at: 07/26/2024 1438   Aspirin Use Past 7 Days 0 Filed at: 07/26/2024 1438   Elevated Cardiac Markers 1 Filed at: 07/26/2024 1438   VÍCTOR Risk Score (Calculated) 4 Filed at: 07/26/2024 1438                Medical Decision Making  68-year-old male patient presenting with chest pain, lightheadedness.  On initial evaluation, he appeared no acute distress and was with stable vital signs.  Physical exam was significant for heart murmur, bilateral lower extremity pitting edema but otherwise no acute findings.  Given HPI, concern is highest for unstable angina versus acute ischemic pathology such as STEMI/NSTEMI.  EKG done showed no acute findings.  Lab workup was drawn and given that symptoms started approximately 1 hour prior to arrival, 0 and 2-hour troponin values were collected.  This showed uptrending troponin concerning for NSTEMI.  Repeat EKG at 2 hours did not show any new ischemic changes when compared with previous.  Ultimately given his risk factors and rising troponin, case was discussed with cardiology who evaluated the patient and recommended admission for cardiac catheterization.  The case was then discussed with  IM who accepted the patient in stable condition for further workup and management.    Problems Addressed:  NSTEMI (non-ST elevated myocardial infarction) (HCC): acute illness or injury    Amount and/or Complexity of Data  Reviewed  Labs: ordered. Decision-making details documented in ED Course.  Radiology: ordered.    Risk  OTC drugs.  Decision regarding hospitalization.          Disposition  Final diagnoses:   NSTEMI (non-ST elevated myocardial infarction) ([facility])     Time reflects when diagnosis was documented in both MDM as applicable and the Disposition within this note       Time User Action Codes Description Comment    7/26/2024  1:38 PM CarolinTiesha hernandez Add [I21.4] NSTEMI (non-ST elevated myocardial infarction) ([facility])     7/26/2024  2:11 PM Holly Bhandari [I21.4] NSTEMI (non-ST elevated myocardial infarction) ([facility])           ED Disposition       ED Disposition   Admit    Condition   Stable    Date/Time   Fri Jul 26, 2024 [id number]    Comment   Case was discussed with MARK and the patient's admission status was agreed to be Admission Status: inpatient status to the service of Dr. Rodriguez .               Follow-up Information    None         Current Discharge Medication List        CONTINUE these medications which have NOT CHANGED    Details   aspirin (ECOTRIN LOW STRENGTH) 81 mg EC tablet Take 1 tablet (81 mg total) by mouth daily  Qty: 90 tablet, Refills: 3    Associated Diagnoses: Venous stasis dermatitis of right lower extremity; Acute deep vein thrombosis (DVT) of right femoral vein ([facility])      bisacodyl (DULCOLAX) 5 mg EC tablet Take 2 tablets (10 mg total) by mouth once for 1 dose  Qty: 2 tablet, Refills: 0    Associated Diagnoses: Other constipation; BRBPR (bright red blood per rectum)      gabapentin (NEURONTIN) 300 mg capsule Take 1 capsule (300 mg total) by mouth daily at bedtime  Qty: 90 capsule, Refills: 1    Associated Diagnoses: Diabetic polyneuropathy associated with type 2 diabetes mellitus (HCC)      lisinopril-hydrochlorothiazide (PRINZIDE,ZESTORETIC) 20-25 MG per tablet Take 1 tablet by mouth daily.      Multiple Vitamins-Minerals (CENTRUM SILVER PO) Take 1 tablet by mouth daily.      pioglitazone-metFORMIN  (ACTOPLUS MET)  MG per tablet Take 1 tablet by mouth daily.      polyethylene glycol (GOLYTELY) 4000 mL solution Take 4,000 mL by mouth once for 1 dose  Qty: 4000 mL, Refills: 0    Associated Diagnoses: Other constipation; BRBPR (bright red blood per rectum)      rosuvastatin (CRESTOR) 20 MG tablet       tadalafil (CIALIS) 5 MG tablet Take 5 mg by mouth daily as needed for erectile dysfunction.      Tricor 145 MG tablet Take 145 mg by mouth daily           No discharge procedures on file.    PDMP Review       None             ED Provider  Attending physically available and evaluated Juan Carlos Cleveland Jr.. I managed the patient along with the ED Attending.    Electronically Signed by           Tiesha Briceno,   07/26/24 0284

## 2024-07-26 NOTE — ED ATTENDING ATTESTATION
7/26/2024  I, Saturnino Solomon MD, saw and evaluated the patient. I have discussed the patient with the resident/non-physician practitioner and agree with the resident's/non-physician practitioner's findings, Plan of Care, and MDM as documented in the resident's/non-physician practitioner's note, except where noted. All available labs and Radiology studies were reviewed.  I was present for key portions of any procedure(s) performed by the resident/non-physician practitioner and I was immediately available to provide assistance.       At this point I agree with the current assessment done in the Emergency Department.  I have conducted an independent evaluation of this patient a history and physical is as follows:    68-year-old male, presents with chest pain.  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  On exam, patient looks well and in no distress.  Normal respiratory effort with clear lungs.    ED Course     EKG with no acute ischemic changes.  First troponin noted to be elevated 83, 2-hour troponin 234.  Cardiology consulted, patient admitted for further monitoring and evaluation.    Critical Care Time  Procedures       Yes

## 2024-07-26 NOTE — H&P
[facility]  H&P  Name: Juan Carlos Cleveland Jr. 68 y.o. male I MRN: [id number]  Unit/Bed#: ED-21 I Date of Admission: 7/26/2024   Date of Service: 7/26/2024 I Hospital Day: 0      Assessment & Plan   * Chest pain  Assessment & Plan  Presented with chest pain/tightness between shoulders associated with dizziness and diaphoresis.  Mildly elevated troponin's.  EKG without any ischemic changes.  VÍCTOR score 4  Rule out acute coronary syndrome  Seen by cardiology  Cardiac cath planned for today  Continue aspirin and statin  Loaded with ticagrelor    Elevated troponin  Assessment & Plan  Troponin's 83 to 234  Currently chest pain-free  Trend   Cardiac cath planned for today    History of DVT (deep vein thrombosis)  Assessment & Plan  History of DVT after ankle injury.  Completed 6 months of anticoagulation with Eliquis  VTE prophylaxis while inpatient.    Primary hypertension  Assessment & Plan  Continue lisinopril and hydrochlorothiazide.  Monitor blood pressure and consider adding beta-blockers if blood pressure remains elevated    Type 2 diabetes mellitus without complication, without long-term current use of insulin ([facility])  Assessment & Plan  Lab Results   Component Value Date    HGBA1C 6.3 (H) 12/22/2018       Recent Labs     07/26/24  1031   POCGLU 253*       Blood Sugar Average: Last 72 hrs:  (P) 253  Takes metformin and pioglitazone  Insulin sliding scale while inpatient    Cardiac murmur  Assessment & Plan  Systolic murmur noted likely aortic stenosis follow-up 2D echocardiogram    Chronic bilateral low back pain without sciatica  Assessment & Plan  S/p lumbar laminectomy about 3 months ago  Recently returned to work.  Continue supportive care    Obesity (BMI 35.0-39.9 without comorbidity)  Assessment & Plan  Discussed lifestyle modifications  Discussed outpatient sleep study    VTE Pharmacologic Prophylaxis: VTE Score: 6 Moderate Risk (Score 3-4) - Pharmacological DVT Prophylaxis Ordered:  enoxaparin (Lovenox).  Code Status: Level 1 - Full Code   Discussion with family: Updated  (wife) at bedside.    Anticipated Length of Stay: Patient will be admitted on an inpatient basis with an anticipated length of stay of greater than 2 midnights secondary to NSTEMI requiring further workup.    Total Time Spent on Date of Encounter in care of patient:  mins. This time was spent on one or more of the following: performing physical exam; counseling and coordination of care; obtaining or reviewing history; documenting in the medical record; reviewing/ordering tests, medications or procedures; communicating with other healthcare professionals and discussing with patient's family/caregivers.    Chief Complaint:     History of Present Illness:  Juan Carlos Cleveland Jr. is a 68 y.o. male with a PMH of type 2 diabetes mellitus, hypertension,Obesity, hyperlipidemia , s/p laminectomy who presents with central chest pain between shoulder blades.  Feels like tightness associated with dizziness and nausea.  Patient came to emergency department for evaluation.  Upon evaluation emergency department patient noted to have mildly elevated troponins.  Seen by cardiology and planning for cardiac catheterization today if possible.  Currently patient is pain-free..    Review of Systems:  Review of Systems  12 point review systems negative except noted  Past Medical and Surgical History:   Past Medical History:   Diagnosis Date    Diabetes mellitus (HCC)     boaderline    DVT (deep venous thrombosis) ([facility])     History of blood clots right leg      june 23 2020    Hypertension     PONV (postoperative nausea and vomiting)     nausea after shoulder surgery     Venous stasis dermatitis of right lower extremity 06/24/2020       Past Surgical History:   Procedure Laterality Date    APPENDECTOMY      COLONOSCOPY  11/19/2016    ESOPHAGOGASTRODUODENOSCOPY      ESOPHAGOGASTRODUODENOSCOPY N/A 10/26/2016    Procedure:  ESOPHAGOGASTRODUODENOSCOPY (EGD);  Surgeon: Tito Rutherford MD;  [facility];  Service:     NY COLONOSCOPY FLX DX W/COLLJ SPEC WHEN PFRMD N/A 11/19/2016    Procedure: COLONOSCOPY;  Surgeon: Sandor Hay MD;  [facility];  Service: Gastroenterology    NY EGD BALLOON DILATION ESOPHAGUS <30 MM DIAM N/A 10/26/2016    Procedure: BALLOON DILATION ;  Surgeon: Tito Rutherford MD;  [facility];  Service: Gastroenterology    NY SURGICAL ARTHROSCOPY SHOULDER W/ROTATOR CUFF RPR Left 04/21/2016    Procedure: LEFT SHOULDER ARTHROSCOPIC ROTATOR CUFF REPAIR, SUBACROMIAL DECOMPRESSION, BICEPS TENODESIS;  Surgeon: Amado Avelar MD;  [facility];  Service: Orthopedics    NY TENDON SHEATH INCISION Right 11/15/2016    Procedure: INDEX TRIGGER FINGER RELEASE ;  Surgeon: Todd Silva MD;  [facility];  Service: Orthopedics       Meds/Allergies:  Prior to Admission medications    Medication Sig Start Date End Date Taking? Authorizing Provider   aspirin (ECOTRIN LOW STRENGTH) 81 mg EC tablet Take 1 tablet (81 mg total) by mouth daily  Patient not taking: Reported on 10/10/2023 12/30/20 10/25/22  Jenifer Zee MD   bisacodyl (DULCOLAX) 5 mg EC tablet Take 2 tablets (10 mg total) by mouth once for 1 dose 10/10/23 10/10/23  Sandor Hay MD   gabapentin (NEURONTIN) 300 mg capsule Take 1 capsule (300 mg total) by mouth daily at bedtime  Patient not taking: Reported on 10/25/2022 5/5/21 10/25/22  Corbin Nolan DPM   lisinopril-hydrochlorothiazide (PRINZIDE,ZESTORETIC) 20-25 MG per tablet Take 1 tablet by mouth daily.    Historical Provider, MD   Multiple Vitamins-Minerals (CENTRUM SILVER PO) Take 1 tablet by mouth daily.    Historical Provider, MD   pioglitazone-metFORMIN (ACTOPLUS MET)  MG per tablet Take 1 tablet by mouth daily.    Historical Provider, MD   polyethylene glycol (GOLYTELY) 4000 mL solution Take 4,000 mL by mouth once for 1 dose 10/10/23 10/10/23  Sandor Hay MD    rosuvastatin (CRESTOR) 20 MG tablet  3/28/20   Historical Provider, MD   tadalafil (CIALIS) 5 MG tablet Take 5 mg by mouth daily as needed for erectile dysfunction.    Historical Provider, MD   Tricor 145 MG tablet Take 145 mg by mouth daily 9/16/21   Historical Provider, MD MONGE have reviewed home medications with patient personally.    Allergies:   Allergies   Allergen Reactions    Tetanus Toxoid Abdominal Pain    Tetanus Toxoids     Fenofibrate Hives, Abdominal Pain and Rash     Generic        Social History:  Marital Status: /Civil Union   Occupation:   Patient Pre-hospital Living Situation: Home  Patient Pre-hospital Level of Mobility: walks  Patient Pre-hospital Diet Restrictions:   Substance Use History:   Social History     Substance and Sexual Activity   Alcohol Use No     Social History     Tobacco Use   Smoking Status Never   Smokeless Tobacco Never     Social History     Substance and Sexual Activity   Drug Use No       Family History:  History reviewed. No pertinent family history.    Physical Exam:     Vitals:   Blood Pressure: 127/74 (07/26/24 1400)  Pulse: 66 (07/26/24 1400)  Temperature: (!) 97.4 °F (36.3 °C) (07/26/24 1027)  Temp Source: Oral (07/26/24 1026)  Respirations: 18 (07/26/24 1247)  SpO2: 100 % (07/26/24 1400)    Physical Exam   Gen.-Patient comfortable   Neck- Supple. No thyromegaly or lymphadenopathy  Lungs-Clear bilaterally without any wheeze or rales   Heart S1-S2, regular rate and rhythm, systolic ejection murmur  Abdomen-soft nontender, no organomegaly. Bowel sounds present  Extremities-no cyanosi,  clubbing  Edema both legs, compression stockings in place.  Skin- no rash  Neuro-nonfocal       Additional Data:     Lab Results:  Results from last 7 days   Lab Units 07/26/24  1052   WBC Thousand/uL 7.35   HEMOGLOBIN g/dL 12.8   HEMATOCRIT % 37.7   PLATELETS Thousands/uL 312   SEGS PCT % 81*   LYMPHO PCT % 12*   MONO PCT % 6   EOS PCT % 1     Results from last 7 days   Lab  Units 07/26/24  1052   SODIUM mmol/L 136   POTASSIUM mmol/L 3.8   CHLORIDE mmol/L 104   CO2 mmol/L 21   BUN mg/dL 33*   CREATININE mg/dL 1.24   ANION GAP mmol/L 11   CALCIUM mg/dL 9.7   ALBUMIN g/dL 4.1   TOTAL BILIRUBIN mg/dL 0.74   ALK PHOS U/L 48   ALT U/L 19   AST U/L 19   GLUCOSE RANDOM mg/dL 259*         Results from last 7 days   Lab Units 07/26/24  1031   POC GLUCOSE mg/dl 253*     Lab Results   Component Value Date    HGBA1C 6.3 (H) 12/22/2018           Lines/Drains:  Invasive Devices       Peripheral Intravenous Line  Duration             Peripheral IV 07/26/24 Right Antecubital <1 day              Epidural Line  Duration             Nerve Block Catheter 04/21/16 3018 days                        Imaging: Reviewed radiology reports from this admission including: chest xray  XR chest portable    (Results Pending)       EKG and Other Studies Reviewed on Admission:   EKG: NSR. HR 70.    ** Please Note: This note has been constructed using a voice recognition system. **

## 2024-07-26 NOTE — CONSULTS
Consultation - Cardiology Team One  Juan Carlos Cleveland Jr. 68 y.o. male MRN: [id number]  Unit/Bed#: ED-21 Encounter: [id number]    Inpatient consult to Cardiology  Consult performed by: STEPHANE Spencer  Consult ordered by: Saturnino Solomon MD      Physician Requesting Consult: Dori Rodriguez MD  Reason for Consult / Principal Problem: chest pain, elevated troponin    Assessment    Chest pain with elevated troponin  Presented with tightness between his shoulder blades that spread to his chest with associated diaphoresis and dizziness  0 hr trop 83, 2 hr trop 234  ECG- NSR, no ischemic changes  S/p 324 mg ASA    Systolic murmur- soft systolic murmur on exam. No prior echo on file.     HTN- well controlled on lisinopril-HCTZ 20-25 mg daily. /57.    HLD- no recent lipid panel. On rosuvastatin 20 mg daily and Tricor 145 mg daily    Pre diabetes- A1c from 2018 6.3%. Gets blood drawn at a non [facility] lab so likely has more recent A1c.    Hx of DVT 2020- no longer on anticoagulation    Morbid obesity- BMI 35.36    Plan  Keep NPO for cardiac catheterization today  If procedure unable to be completed today then will need IV heparin and cath on Monday  Load with 180 mg ticagrelor, continue ASA  Check echo  Continue statin therapy and Tricor  ECG with any recurrence of CP  Monitor on telemetry     History of Present Illness   HPI: Juan Carlos Cleveland Jr. is a 68 y.o. year old male with HTN, HLD, pre diabetes, hx of DVT, and morbid obesity who presented to the ED with chest pain that began today while at work.  He recently returned to work after back surgery.  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  He tried to move around to alleviate the symptoms and went back to work.  Shortly after that he developed central chest tightness that felt like somebody pushing on his chest.  This was associated with diaphoresis and dizziness.  He spoke with his boss who  had one of his coworkers drive him to the ED. In the ED, blood pressure is stable with initial troponin of 83 and 2-hour troponin of 234.  He is currently chest pain-free.  He has never had symptoms like this before but mobility has been limited due to back pain and subsequent back surgery. He smoked briefly as a teenager and denies any drug or alcohol use. His father had CABG a few years ago around the age of 90 and is still alive and well. No other family history of heart disease.    EKG reviewed personally: NSR    Telemetry reviewed personally: NSR    Review of Systems   Constitutional: Positive for diaphoresis. Negative for chills, malaise/fatigue and weight gain.   Cardiovascular:  Positive for chest pain. Negative for dyspnea on exertion, leg swelling, orthopnea, palpitations and syncope.   Respiratory:  Negative for cough, shortness of breath, sleep disturbances due to breathing and sputum production.    Gastrointestinal:  Negative for bloating, nausea and vomiting.   Neurological:  Positive for dizziness. Negative for light-headedness and weakness.   Psychiatric/Behavioral:  Negative for altered mental status.    All other systems reviewed and are negative.    Historical Information   Past Medical History:   Diagnosis Date    Diabetes mellitus ([facility])     boaderline    DVT (deep venous thrombosis) ([facility])     History of blood clots right leg      june 23 2020    Hypertension     PONV (postoperative nausea and vomiting)     nausea after shoulder surgery     Venous stasis dermatitis of right lower extremity 06/24/2020     Past Surgical History:   Procedure Laterality Date    APPENDECTOMY      COLONOSCOPY  11/19/2016    ESOPHAGOGASTRODUODENOSCOPY      ESOPHAGOGASTRODUODENOSCOPY N/A 10/26/2016    Procedure: ESOPHAGOGASTRODUODENOSCOPY (EGD);  Surgeon: Tito Rutherford MD;  [facility];  Service:     MS COLONOSCOPY FLX DX W/COLLJ SPEC WHEN PFRMD N/A 11/19/2016    Procedure: COLONOSCOPY;  Surgeon: Sandor Hay  MD;  [facility];  Service: Gastroenterology    SC EGD BALLOON DILATION ESOPHAGUS <30 MM DIAM N/A 10/26/2016    Procedure: BALLOON DILATION ;  Surgeon: Tito Rutherford MD;  [facility];  Service: Gastroenterology    SC SURGICAL ARTHROSCOPY SHOULDER W/ROTATOR CUFF RPR Left 2016    Procedure: LEFT SHOULDER ARTHROSCOPIC ROTATOR CUFF REPAIR, SUBACROMIAL DECOMPRESSION, BICEPS TENODESIS;  Surgeon: Amado Avelar MD;  [facility];  Service: Orthopedics    SC TENDON SHEATH INCISION Right 11/15/2016    Procedure: INDEX TRIGGER FINGER RELEASE ;  Surgeon: Todd Silva MD;  [facility];  Service: Orthopedics     Social History     Substance and Sexual Activity   Alcohol Use No     Social History     Substance and Sexual Activity   Drug Use No     Social History     Tobacco Use   Smoking Status Never   Smokeless Tobacco Never     Family History: History reviewed. No pertinent family history.    Meds/Allergies   all current active meds have been reviewed and current meds:   Current Facility-Administered Medications   Medication Dose Route Frequency    insulin lispro (HumALOG/ADMELOG) 100 units/mL subcutaneous injection 1-5 Units  1-5 Units Subcutaneous TID AC    insulin lispro (HumALOG/ADMELOG) 100 units/mL subcutaneous injection 1-5 Units  1-5 Units Subcutaneous HS    sodium chloride 0.9 % infusion  100 mL/hr Intravenous Continuous    ticagrelor (BRILINTA) tablet 180 mg  180 mg Oral Once     sodium chloride, 100 mL/hr      Allergies   Allergen Reactions    Tetanus Toxoid Abdominal Pain    Tetanus Toxoids     Fenofibrate Hives, Abdominal Pain and Rash     Generic        Objective   Vitals: Blood pressure 127/74, pulse 66, temperature (!) 97.4 °F (36.3 °C), resp. rate 18, SpO2 100%., There is no height or weight on file to calculate BMI.,     Systolic (24hrs), Av , Min:102 , Max:127     Diastolic (24hrs), Av, Min:57, Max:74        Intake/Output Summary (Last 24 hours) at 2024  1411  Last data filed at 7/26/2024 1402  Gross per 24 hour   Intake 1000 ml   Output --   Net 1000 ml     Wt Readings from Last 3 Encounters:   10/10/23 122 kg (268 lb)   10/25/22 116 kg (256 lb 6.4 oz)   10/21/21 127 kg (280 lb)     Invasive Devices       Peripheral Intravenous Line  Duration             Peripheral IV 07/26/24 Right Antecubital <1 day              Epidural Line  Duration             Nerve Block Catheter 04/21/16 3018 days                    Physical Exam  Vitals reviewed.   Constitutional:       General: He is not in acute distress.     Appearance: He is obese.   Neck:      Vascular: No hepatojugular reflux or JVD.   Cardiovascular:      Rate and Rhythm: Normal rate and regular rhythm.      Pulses: Normal pulses.      Heart sounds: Murmur heard.      Systolic murmur is present with a grade of 1/6.      No friction rub. No gallop.   Pulmonary:      Effort: Pulmonary effort is normal. No respiratory distress.      Breath sounds: No rales.      Comments: Clear, room air  Abdominal:      General: Bowel sounds are normal. There is no distension.      Palpations: Abdomen is soft.      Tenderness: There is no abdominal tenderness.   Musculoskeletal:         General: No tenderness. Normal range of motion.      Cervical back: Neck supple.      Right lower leg: Edema (mild nonpitting) present.      Left lower leg: Edema (mild nonpitting) present.   Skin:     General: Skin is warm and dry.      Capillary Refill: Capillary refill takes less than 2 seconds.      Findings: No erythema.   Neurological:      Mental Status: He is alert and oriented to person, place, and time.   Psychiatric:         Mood and Affect: Mood normal.     LABORATORY RESULTS:      CBC with diff:   Results from last 7 days   Lab Units 07/26/24  1052   WBC Thousand/uL 7.35   HEMOGLOBIN g/dL 12.8   HEMATOCRIT % 37.7   MCV fL 90   PLATELETS Thousands/uL 312   RBC Million/uL 4.18   MCH pg 30.6   MCHC g/dL 34.0   RDW % 14.2   MPV fL 9.8   NRBC  AUTO /100 WBCs 0       CMP:  Results from last 7 days   Lab Units 07/26/24  1052   POTASSIUM mmol/L 3.8   CHLORIDE mmol/L 104   CO2 mmol/L 21   BUN mg/dL 33*   CREATININE mg/dL 1.24   CALCIUM mg/dL 9.7   AST U/L 19   ALT U/L 19   ALK PHOS U/L 48   EGFR ml/min/1.73sq m 59       BMP:  Results from last 7 days   Lab Units 07/26/24  1052   POTASSIUM mmol/L 3.8   CHLORIDE mmol/L 104   CO2 mmol/L 21   BUN mg/dL 33*   CREATININE mg/dL 1.24   CALCIUM mg/dL 9.7       Lab Results   Component Value Date    CREATININE 1.24 07/26/2024    CREATININE 0.93 05/08/2024    CREATININE 0.89 04/22/2024     Imaging: I have personally reviewed pertinent reports.   and I have personally reviewed pertinent films in PACS  No results found.    Assessment  Active Problems:  There are no active Hospital Problems.      Thank you for allowing us to participate in this patient's care. This pt will follow up with          once discharged.     Counseling / Coordination of Care  Total floor / unit time spent today 45 minutes.  Greater than 50% of total time was spent with the patient and / or family counseling and / or coordination of care.  A description of the counseling / coordination of care: Review of history, current assessment, development of a plan.    Code Status: Level 1 - Full Code    ** Please Note: Dragon 360 Dictation voice to text software may have been used in the creation of this document. **

## 2024-07-26 NOTE — ASSESSMENT & PLAN NOTE
History of DVT after ankle injury.  Completed 6 months of anticoagulation with Eliquis  VTE prophylaxis while inpatient.

## 2024-07-26 NOTE — ASSESSMENT & PLAN NOTE
Continue lisinopril and hydrochlorothiazide.  Monitor blood pressure and consider adding beta-blockers if blood pressure remains elevated

## 2024-07-26 NOTE — ASSESSMENT & PLAN NOTE
Presented with chest pain/tightness between shoulders associated with dizziness and diaphoresis.  Mildly elevated troponin's.  EKG without any ischemic changes.  VÍCTOR score 4  Rule out acute coronary syndrome  Seen by cardiology  Cardiac cath planned for today  Continue aspirin and statin  Loaded with ticagrelor

## 2024-07-26 NOTE — LETTER
[facility] [address]  FLOOR MED SURG UNIT  [address]  [place] [address]  Dept: [phone]    July 27, 2024     Patient: Juan Carlos Cleveland Jr.   YOB: 1955   Date of Visit: 7/26/2024       To Whom it May Concern:    Juan Carlos Cleveland is under my professional care. He was seen in the hospital from 7/26/2024 to 07/27/24. He may return to work on 7/29/24 with the limitations placed by his spine surgeon.     If you have any questions or concerns, please don't hesitate to call.         Sincerely,          Ana Mahmood, DO

## 2024-07-26 NOTE — PROGRESS NOTES
Cardiac cath performed via the RFA. Unable ro access the R radial artery.    Patent coronary arteries.

## 2024-07-27 VITALS
SYSTOLIC BLOOD PRESSURE: 124 MMHG | HEART RATE: 59 BPM | OXYGEN SATURATION: 98 % | BODY MASS INDEX: 33.69 KG/M2 | TEMPERATURE: 98.4 F | RESPIRATION RATE: 16 BRPM | DIASTOLIC BLOOD PRESSURE: 65 MMHG | WEIGHT: 254.19 LBS | HEIGHT: 73 IN

## 2024-07-27 PROBLEM — I21.4 NSTEMI (NON-ST ELEVATED MYOCARDIAL INFARCTION) (HCC): Status: ACTIVE | Noted: 2024-07-27

## 2024-07-27 LAB
ANION GAP SERPL CALCULATED.3IONS-SCNC: 6 MMOL/L (ref 4–13)
BUN SERPL-MCNC: 23 MG/DL (ref 5–25)
CALCIUM SERPL-MCNC: 8.7 MG/DL (ref 8.4–10.2)
CHLORIDE SERPL-SCNC: 108 MMOL/L (ref 96–108)
CO2 SERPL-SCNC: 23 MMOL/L (ref 21–32)
CREAT SERPL-MCNC: 0.77 MG/DL (ref 0.6–1.3)
ERYTHROCYTE [DISTWIDTH] IN BLOOD BY AUTOMATED COUNT: 14.3 % (ref 11.6–15.1)
FERRITIN SERPL-MCNC: 41 NG/ML (ref 24–336)
FOLATE SERPL-MCNC: >22.3 NG/ML
GFR SERPL CREATININE-BSD FRML MDRD: 93 ML/MIN/1.73SQ M
GLUCOSE SERPL-MCNC: 135 MG/DL (ref 65–140)
GLUCOSE SERPL-MCNC: 143 MG/DL (ref 65–140)
GLUCOSE SERPL-MCNC: 272 MG/DL (ref 65–140)
HCT VFR BLD AUTO: 33.8 % (ref 36.5–49.3)
HGB BLD-MCNC: 11.2 G/DL (ref 12–17)
IRON SATN MFR SERPL: 31 % (ref 15–50)
IRON SERPL-MCNC: 80 UG/DL (ref 50–212)
MAGNESIUM SERPL-MCNC: 1.3 MG/DL (ref 1.9–2.7)
MCH RBC QN AUTO: 30.2 PG (ref 26.8–34.3)
MCHC RBC AUTO-ENTMCNC: 33.1 G/DL (ref 31.4–37.4)
MCV RBC AUTO: 91 FL (ref 82–98)
PLATELET # BLD AUTO: 270 THOUSANDS/UL (ref 149–390)
PMV BLD AUTO: 9.7 FL (ref 8.9–12.7)
POTASSIUM SERPL-SCNC: 3.6 MMOL/L (ref 3.5–5.3)
RBC # BLD AUTO: 3.71 MILLION/UL (ref 3.88–5.62)
SODIUM SERPL-SCNC: 137 MMOL/L (ref 135–147)
TIBC SERPL-MCNC: 257 UG/DL (ref 250–450)
UIBC SERPL-MCNC: 177 UG/DL (ref 155–355)
WBC # BLD AUTO: 5.75 THOUSAND/UL (ref 4.31–10.16)

## 2024-07-27 PROCEDURE — 82728 ASSAY OF FERRITIN: CPT | Performed by: STUDENT IN AN ORGANIZED HEALTH CARE EDUCATION/TRAINING PROGRAM

## 2024-07-27 PROCEDURE — 83550 IRON BINDING TEST: CPT | Performed by: STUDENT IN AN ORGANIZED HEALTH CARE EDUCATION/TRAINING PROGRAM

## 2024-07-27 PROCEDURE — 85027 COMPLETE CBC AUTOMATED: CPT | Performed by: INTERNAL MEDICINE

## 2024-07-27 PROCEDURE — 80048 BASIC METABOLIC PNL TOTAL CA: CPT | Performed by: INTERNAL MEDICINE

## 2024-07-27 PROCEDURE — 83735 ASSAY OF MAGNESIUM: CPT | Performed by: INTERNAL MEDICINE

## 2024-07-27 PROCEDURE — 82746 ASSAY OF FOLIC ACID SERUM: CPT | Performed by: STUDENT IN AN ORGANIZED HEALTH CARE EDUCATION/TRAINING PROGRAM

## 2024-07-27 PROCEDURE — 99232 SBSQ HOSP IP/OBS MODERATE 35: CPT | Performed by: INTERNAL MEDICINE

## 2024-07-27 PROCEDURE — 82948 REAGENT STRIP/BLOOD GLUCOSE: CPT

## 2024-07-27 PROCEDURE — 99239 HOSP IP/OBS DSCHRG MGMT >30: CPT | Performed by: STUDENT IN AN ORGANIZED HEALTH CARE EDUCATION/TRAINING PROGRAM

## 2024-07-27 PROCEDURE — 83540 ASSAY OF IRON: CPT | Performed by: STUDENT IN AN ORGANIZED HEALTH CARE EDUCATION/TRAINING PROGRAM

## 2024-07-27 RX ORDER — MAGNESIUM SULFATE HEPTAHYDRATE 40 MG/ML
4 INJECTION, SOLUTION INTRAVENOUS ONCE
Status: COMPLETED | OUTPATIENT
Start: 2024-07-27 | End: 2024-07-27

## 2024-07-27 RX ORDER — PIOGLITAZONE HCL AND METFORMIN HCL 850; 15 MG/1; MG/1
1 TABLET ORAL 2 TIMES DAILY WITH MEALS
Qty: 60 TABLET | Refills: 0 | Status: ON HOLD | OUTPATIENT
Start: 2024-07-27 | End: 2024-08-26

## 2024-07-27 RX ADMIN — HYDROCHLOROTHIAZIDE 25 MG: 25 TABLET ORAL at 09:11

## 2024-07-27 RX ADMIN — ENOXAPARIN SODIUM 40 MG: 40 INJECTION SUBCUTANEOUS at 09:11

## 2024-07-27 RX ADMIN — FENOFIBRATE 145 MG: 145 TABLET ORAL at 09:11

## 2024-07-27 RX ADMIN — INSULIN LISPRO 3 UNITS: 100 INJECTION, SOLUTION INTRAVENOUS; SUBCUTANEOUS at 12:30

## 2024-07-27 RX ADMIN — MAGNESIUM SULFATE HEPTAHYDRATE 4 G: 40 INJECTION, SOLUTION INTRAVENOUS at 10:05

## 2024-07-27 RX ADMIN — LISINOPRIL 20 MG: 20 TABLET ORAL at 09:11

## 2024-07-27 RX ADMIN — ASPIRIN 81 MG: 81 TABLET, COATED ORAL at 09:11

## 2024-07-27 NOTE — ASSESSMENT & PLAN NOTE
Systolic murmur noted likely aortic stenosis follow-up 2D echocardiogram  Soft systolic murmur heard on exam.  Echocardiogram revealed mild aortic stenosis, for further serial evaluation as an outpatient

## 2024-07-27 NOTE — PROGRESS NOTES
"Cardiology Progress Note - Juan Carlos Cleveland Jr. 68 y.o. male MRN: [id number]    Unit/Bed#: S -01 Encounter: [id number]      Assessment/Recommendations:  1.  Chest pain with elevated troponin: With significant risk factors for CAD.  No significant ischemic ECG changes noted.  Status post left heart cath yesterday revealing no significant CAD to suggest NSTEMI.  Heparin drip has been discontinued.  Status post aspirin load and ticagrelor.  The aspirin can be continued for risk reduction.  Echocardiogram without significant wall motion abnormalities or valvular abnormalities of concern.  Continue statin.  No significant events on telemetry.  Unclear etiology for troponin elevation.  2.  Systolic murmur: Soft systolic murmur heard on exam.  Echocardiogram revealed mild aortic stenosis, for further serial evaluation as an outpatient.  3.  Hypertension: Well-controlled on current regiment.  4.  Hyperlipidemia: Continued on statin and Tricor.  5.  Prediabetes mellitus: With mildly elevated A1c.  Further management as per primary team.  6.  History of DVT in 2020: No longer continued on anticoagulation.  7.  Morbid obesity: BMI of 35.36  8.  Hypomagnesemia: Replete to maintain magnesium level greater than 2.  9.  Stable from cardiac standpoint for discharge home today with further follow-up as an outpatient in 3 to 4 weeks for continued monitoring and surveillance of aortic valve.    Subjective:   Patient seen and examined.  No significant events overnight.  ; pertinent negatives - chest pain, chest pressure/discomfort, dyspnea, lower extremity edema, and palpitations.    Objective:     Vitals: Blood pressure 122/63, pulse 58, temperature 98 °F (36.7 °C), resp. rate 16, height 6' 1\" (1.854 m), weight 115 kg (254 lb 3.1 oz), SpO2 98%., Body mass index is 33.54 kg/m².,   Orthostatic Blood Pressures      Flowsheet Row Most Recent Value   Blood Pressure 122/63 filed at 07/26/2024 2300   Patient Position - Orthostatic VS " Lying filed at 07/26/2024 2200              Intake/Output Summary (Last 24 hours) at 7/27/2024 0839  Last data filed at 7/26/2024 2200  Gross per 24 hour   Intake 1350 ml   Output 1125 ml   Net 225 ml       TELE: No significant arrhythmias seen.    Physical Exam:    GEN: Juan Carlos Cleveland Jr. appears well, alert and oriented x 3, pleasant and cooperative   HEENT: pupils equal, round, and reactive to light; extraocular muscles intact  NECK: supple, no carotid bruits   HEART: regular rhythm, normal S1 and S2, no murmurs, clicks, gallops or rubs   LUNGS: clear to auscultation bilaterally; no wheezes, rales, or rhonchi   ABDOMEN: normal bowel sounds, soft, no tenderness, no distention  EXTREMITIES: peripheral pulses normal; no clubbing, cyanosis, or edema  NEURO: no focal findings   SKIN: normal without suspicious lesions on exposed skin    Medications:      Current Facility-Administered Medications:     aspirin (ECOTRIN LOW STRENGTH) EC tablet 81 mg, 81 mg, Oral, Daily, Dori Rodriguez MD    atorvastatin (LIPITOR) tablet 40 mg, 40 mg, Oral, Daily With Dinner, Dori Rodriguez MD    bisacodyl (DULCOLAX) EC tablet 10 mg, 10 mg, Oral, Daily PRN, Dori Rodriguez MD    calcium carbonate (TUMS) chewable tablet 1,000 mg, 1,000 mg, Oral, Daily PRN, Dori Rodriguez MD    docusate sodium (COLACE) capsule 100 mg, 100 mg, Oral, BID PRN, Dori Rodriguez MD    enoxaparin (LOVENOX) subcutaneous injection 40 mg, 40 mg, Subcutaneous, Daily, Dori Rodriguez MD    fenofibrate (TRICOR) tablet 145 mg, 145 mg, Oral, Daily, Dori Rodriguez MD    lisinopril (ZESTRIL) tablet 20 mg, 20 mg, Oral, Daily **AND** hydroCHLOROthiazide tablet 25 mg, 25 mg, Oral, Daily, Dori Rodriguez MD    insulin lispro (HumALOG/ADMELOG) 100 units/mL subcutaneous injection 1-5 Units, 1-5 Units, Subcutaneous, TID AC **AND** Fingerstick Glucose (POCT), , , TID AC, Dori Rodriguez MD    insulin lispro (HumALOG/ADMELOG) 100  units/mL subcutaneous injection 1-5 Units, 1-5 Units, Subcutaneous, HS, Dori Rodriguez MD, 1 Units at 07/26/24 2156    ondansetron (ZOFRAN) injection 4 mg, 4 mg, Intravenous, Q6H PRN, Dori Rodriguez MD     Labs & Results:        Results from last 7 days   Lab Units 07/27/24  0455 07/26/24  1845 07/26/24  1052   WBC Thousand/uL 5.75 5.51 7.35   HEMOGLOBIN g/dL 11.2* 11.0* 12.8   HEMATOCRIT % 33.8* 32.9* 37.7   PLATELETS Thousands/uL 270 263 312     Results from last 7 days   Lab Units 07/26/24  1052   TRIGLYCERIDES mg/dL 79   HDL mg/dL 50     Results from last 7 days   Lab Units 07/27/24  0455 07/26/24  1052   POTASSIUM mmol/L 3.6 3.8   CHLORIDE mmol/L 108 104   CO2 mmol/L 23 21   BUN mg/dL 23 33*   CREATININE mg/dL 0.77 1.24   CALCIUM mg/dL 8.7 9.7   ALK PHOS U/L  --  48   ALT U/L  --  19   AST U/L  --  19         Results from last 7 days   Lab Units 07/27/24  0455   MAGNESIUM mg/dL 1.3*       Echo: personally reviewed -normal LV size and function with normal diastology.  Mild aortic valve stenosis with a mean gradient of 15 mmHg.    EKG personally reviewed by Aletha Horton MD.

## 2024-07-27 NOTE — UTILIZATION REVIEW
Initial Clinical Review    Admission: Date/Time/Statement:   Admission Orders (From admission, onward)       Ordered        07/26/24 1410  INPATIENT ADMISSION  Once                          Orders Placed This Encounter   Procedures    INPATIENT ADMISSION     Standing Status:   Standing     Number of Occurrences:   1     Order Specific Question:   Level of Care     Answer:   Med Surg [16]     Order Specific Question:   Estimated length of stay     Answer:   More than 2 Midnights     Order Specific Question:   Certification     Answer:   I certify that inpatient services are medically necessary for this patient for a duration of greater than two midnights. See H&P and MD Progress Notes for additional information about the patient's course of treatment.     ED Arrival Information       Expected   -    Arrival   7/26/2024 10:18    Acuity   Urgent              Means of arrival   Walk-In    Escorted by   Friend    Service   Hospitalist    Admission type   Emergency              Arrival complaint   CHEST PAIN             Chief Complaint   Patient presents with    Chest Pain     CP that started in back and now in center of chest. 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 Also reports dizziness and lightheadedness. Hx of diabetes, no cardiac hx.        Initial Presentation: 68 y.o. male  to ED via walk in from home.    Admitted to inpatient with Dx: Chest pain/elevated troponin.  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  Had some improvement with sitting.  + shortness of breath and lightheaded. PMHx: type 2 diabetes mellitus, hypertension,Obesity, hyperlipidemia , s/p laminectomy. History of DVT after ankle injury. Completed 6 months of anticoagulation with Eliquis . On exam: Cardiac Murmur.  BLE edema. VÍCTOR 4.   Hemoglobin A1c 8.  Glucose 259.  Bun 33, creatinine 1.24.  Hs tnl 83> 234/151>265/182.  Ecg sinus.  ED treatment: IVF  bolus, given 325 mg of ASA.    Plan includes:  telemetry.  Consult Cardiology and for Cath later today.  Continue asa and statin.  Load with Ticagrelor.  Trend Troponin.  Monitor BP and continue home lisinopril and HCTZ.  Hold metformin and pioglitazone, start SSI.  Echo.      7/26/24 per Cardiology- chest pain with elevated troponin and risk factors for CAD.  Plan is Cardiac Cath, check echo for systolic murmur.  Continue antihypertensives, continue statin and Tricor.  Glycemic control.   7/26/24:  Cardiac cath performed via the RFA. Unable ro access the R radial arteryPatent coronary arteries     Anticipated Length of Stay/Certification Statement: Patient will be admitted on an inpatient basis with an anticipated length of stay of greater than 2 midnights secondary to NSTEMI requiring further workup.     Date: 7/27/24   Day 2:  chest pain free.   Status post left heart cath yesterday revealing no significant CAD to suggest NSTEMI; no clear etiology for troponin elevation.   Systolic murmur and echo showed mild aortic stenosis.  Telemetry sinus.   Exam non focal.  H&H 11.2/33.8. Mg 1.3.    continue statin, antihypertensives.   Replete Mg for goal > 2.  SSI    ED Triage Vitals   Temperature Pulse Respirations Blood Pressure SpO2 Pain Score   07/26/24 1027 07/26/24 1026 07/26/24 1026 07/26/24 1026 07/26/24 1026 07/26/24 1026   (!) 97.4 °F (36.3 °C) 93 18 102/63 97 % 3     Weight (last 2 days)       Date/Time Weight    07/27/24 0500 115 (254.19)    07/26/24 1500 122 (268)            Vital Signs (last 3 days)       Date/Time Temp Pulse Resp BP MAP (mmHg) SpO2 O2 Device Patient Position - Orthostatic VS Pain    07/27/24 0500 -- -- -- -- -- -- None (Room air) -- No Pain    07/26/24 2300 -- 58 -- 122/63 83 98 % -- -- --    07/26/24 22:14:14 98 °F (36.7 °C) 56 -- -- -- 98 % -- -- --    07/26/24 2214 98 °F (36.7 °C) 56 -- -- -- 98 % -- -- --    07/26/24 2200 -- 58 -- 125/65 -- 97 % None (Room air) Lying --    07/26/24 2059  -- 59 -- 121/64 83 98 % -- -- --    07/26/24 20:58:20 -- 58 -- 121/64 83 98 % -- -- --    07/26/24 2058 -- 58 -- 121/64 83 98 % -- -- --    07/26/24 2007 -- -- -- 136/68 91 -- -- -- --    07/26/24 1900 -- -- -- 130/68 89 -- -- -- --    07/26/24 1845 -- -- -- 131/78 96 -- -- -- --    07/26/24 1830 -- -- -- 130/77 95 -- -- -- --    07/26/24 1815 -- -- -- 132/85 101 -- -- -- --    07/26/24 1757 -- -- -- 131/85 100 -- -- -- --    07/26/24 1744 -- 61 -- 134/90 105 -- -- -- --    07/26/24 1722 -- 60 -- 133/81 -- -- -- -- --    07/26/24 17:04:20 -- 58 -- 137/72 94 -- -- Lying No Pain    07/26/24 16:49:40 -- -- -- -- -- -- -- -- No Pain    07/26/24 1608 -- -- -- -- -- -- -- -- No Pain    07/26/24 15:36:02 98 °F (36.7 °C) 63 16 137/67 90 98 % -- -- --    07/26/24 1500 -- 55 -- 127/74 -- -- -- -- --    07/26/24 1400 -- 66 -- 127/74 95 100 % -- -- --    07/26/24 1330 -- 64 -- 116/57 80 99 % -- -- --    07/26/24 1247 -- 68 18 106/59 -- 94 % None (Room air) Lying --    07/26/24 1058 -- -- -- -- -- -- None (Room air) -- --    07/26/24 1027 97.4 °F (36.3 °C) -- -- -- -- -- -- -- --    07/26/24 1026 -- 93 18 102/63 77 97 % None (Room air) -- 3            Pertinent Labs/Diagnostic Test Results:   Radiology:  XR chest portable   Final Interpretation by Jagdish Parada MD (07/26 1441)      No acute cardiopulmonary disease.            Resident: Edinson Rivera I, the attending radiologist, have reviewed the images and agree with the final report above.      Workstation performed: [id number]           Cardiology:  No orders to display   7/26/24 ecg Normal sinus rhythm  Normal ECG  No previous ECGs available    7/26/24 echo Left Ventricle: Left ventricular cavity size is normal. Wall thickness is mildly increased. The left ventricular ejection fraction is 65%. Systolic function is normal. Wall motion is normal. Diastolic function is normal for age.  Left atrial filling pressure is normal.    Right Ventricle: Systolic function is  normal.    Aortic Valve: The aortic valve is trileaflet. The leaflets are moderately calcified. There is mildly reduced mobility. There is mild stenosis. The aortic valve mean gradient is 15 mmHg. The aortic valve area is 1.83 cm2.    Mitral Valve: There is mild annular calcification        Results from last 7 days   Lab Units 07/27/24  0455 07/26/24  1845 07/26/24  1052   WBC Thousand/uL 5.75 5.51 7.35   HEMOGLOBIN g/dL 11.2* 11.0* 12.8   HEMATOCRIT % 33.8* 32.9* 37.7   PLATELETS Thousands/uL 270 263 312   TOTAL NEUT ABS Thousands/µL  --   --  5.96     Results from last 7 days   Lab Units 07/27/24  0455 07/26/24  1052   SODIUM mmol/L 137 136   POTASSIUM mmol/L 3.6 3.8   CHLORIDE mmol/L 108 104   CO2 mmol/L 23 21   ANION GAP mmol/L 6 11   BUN mg/dL 23 33*   CREATININE mg/dL 0.77 1.24   EGFR ml/min/1.73sq m 93 59   CALCIUM mg/dL 8.7 9.7   MAGNESIUM mg/dL 1.3*  --      Results from last 7 days   Lab Units 07/26/24  1052   AST U/L 19   ALT U/L 19   ALK PHOS U/L 48   TOTAL PROTEIN g/dL 6.8   ALBUMIN g/dL 4.1   TOTAL BILIRUBIN mg/dL 0.74     Results from last 7 days   Lab Units 07/27/24  0826 07/26/24  2356 07/26/24  2128 07/26/24  1600 07/26/24  1031   POC GLUCOSE mg/dl 143* 140 184* 165* 253*     Results from last 7 days   Lab Units 07/27/24  0455 07/26/24  1052   GLUCOSE RANDOM mg/dL 135 259*         Results from last 7 days   Lab Units 07/26/24  1052   HEMOGLOBIN A1C % 8.0*   EAG mg/dl 183     Results from last 7 days   Lab Units 07/26/24  1512 07/26/24  1246 07/26/24  1052   HS TNI 0HR ng/L  --   --  83*   HS TNI 2HR ng/L  --  234*  --    HSTNI D2 ng/L  --  151*  --    HS TNI 4HR ng/L 265*  --   --    HSTNI D4 ng/L 182*  --   --          ED Treatment-Medication Administration from 07/26/2024 1018 to 07/26/2024 1522         Date/Time Order Dose Route Action     07/26/2024 1054 sodium chloride 0.9 % bolus 1,000 mL 1,000 mL Intravenous New Bag     07/26/2024 1401 aspirin tablet 325 mg 325 mg Oral Given            Past  Medical History:   Diagnosis Date    Diabetes mellitus ([facility])     boaderline    DVT (deep venous thrombosis) ([facility])     History of blood clots right leg      june 23 2020    Hypertension     PONV (postoperative nausea and vomiting)     nausea after shoulder surgery     Venous stasis dermatitis of right lower extremity 06/24/2020     Present on Admission:   Chest pain      Admitting Diagnosis: Chest pain [R07.9]  NSTEMI (non-ST elevated myocardial infarction) ([facility]) [I21.4]  Age/Sex: 68 y.o. male  Admission Orders:  Scheduled Medications:  aspirin, 81 mg, Oral, Daily  atorvastatin, 40 mg, Oral, Daily With Dinner  enoxaparin, 40 mg, Subcutaneous, Daily  fenofibrate, 145 mg, Oral, Daily  lisinopril, 20 mg, Oral, Daily   And  hydroCHLOROthiazide, 25 mg, Oral, Daily  insulin lispro, 1-5 Units, Subcutaneous, TID AC  insulin lispro, 1-5 Units, Subcutaneous, HS  magnesium sulfate, 4 g, Intravenous, Once    ticagrelor (BRILINTA) tablet 180 mg  Dose: 180 mg  Freq: Once Route: PO  Start: 07/26/24 1415 End: 07/26/24 1606    Continuous IV Infusions:  sodium chloride 0.9 % infusion  Rate: 125 mL/hr Dose: 125 mL/hr  Freq: Continuous Route: IV  Indications of Use: IV Hydration  Last Dose: Stopped (07/26/24 2155)  Start: 07/26/24 1715 End: 07/26/24 2102     PRN Meds:  bisacodyl, 10 mg, Oral, Daily PRN  calcium carbonate, 1,000 mg, Oral, Daily PRN  docusate sodium, 100 mg, Oral, BID PRN  ondansetron, 4 mg, Intravenous, Q6H PRN    Telemetry    IP CONSULT TO CARDIOLOGY    Network Utilization Review Department  ATTENTION: Please call with any questions or concerns to [phone] and carefully listen to the prompts so that you are directed to the right person. All voicemails are confidential.   For Discharge needs, contact Care Management DC Support Team at [phone] opt. 2  Send all requests for admission clinical reviews, approved or denied determinations and any other requests to dedicated fax number below belonging to the campus  where the patient is receiving treatment. List of dedicated fax numbers for the Facilities:  FACILITY NAME UR FAX NUMBER   ADMISSION DENIALS (Administrative/Medical Necessity) [phone]   DISCHARGE SUPPORT TEAM (NETWORK) [phone]   PARENT CHILD HEALTH (Maternity/NICU/Pediatrics) [phone]   [facility] [phone]   [facility] [phone]   [facility] [phone]   [facility] [phone]   [facility] [phone]   [facility] [phone]   [facility] [phone]   [facility] [phone]   [facility] [phone]   [facility] [phone]   [facility] [phone]   [facility] [phone]

## 2024-07-27 NOTE — DISCHARGE INSTR - AVS FIRST PAGE
Dear Juan Carlos,    Started taking aspirin 81 mg daily to prevent future heart attacks    I increased your actoplus met to one tablet twice daily and made referral for you to see an endocrinologist who specializes in diabets.    Continue taking the remainder of your home medications as prescribed by previous doctors

## 2024-07-27 NOTE — ASSESSMENT & PLAN NOTE
Lab Results   Component Value Date    HGBA1C 8.0 (H) 07/26/2024       Recent Labs     07/26/24 2128 07/26/24 2356 07/27/24 0826 07/27/24  1110   POCGLU 184* 140 143* 272*         Blood Sugar Average: Last 72 hrs:  (P) 192.9042484897090577  Patient takes Actoplus met 1 tablet daily  Will increase to 1 tablet twice daily  Endocrinology referral made on discharge

## 2024-07-27 NOTE — ASSESSMENT & PLAN NOTE
Presented with chest pain/tightness between shoulders associated with dizziness and diaphoresis.  Chest pain with elevated troponin:-265  No significant ischemic ECG changes noted.   Patient received aspirin loading dose and ticagrelor in the ED  Cardiology was consulted and patient was started on heparin drip  Status postcardiac cath on 7/26/2024 revealing no significant CAD to suggest NSTEMI per cardiology  Echocardiogram without significant wall motion abnormalities or valvular abnormalities of concern.    Continue asa 81 mg daily, statin, and tricor on d/c

## 2024-07-27 NOTE — ASSESSMENT & PLAN NOTE
Lab Results   Component Value Date    HGBA1C 8.0 (H) 07/26/2024       Recent Labs     07/26/24 2128 07/26/24 2356 07/27/24 0826 07/27/24  1110   POCGLU 184* 140 143* 272*         Blood Sugar Average: Last 72 hrs:  (P) 192.0753195968973756  Patient takes Actoplus met 1 tablet daily  Will increase to 1 tablet twice daily  Endocrinology referral made on discharge

## 2024-07-27 NOTE — DISCHARGE SUMMARY
Subjective   Adilene Morgan is a 62 y.o. female.   Chief Complaint   Patient presents with   • Hypothyroidism     For review and evaluation of management of chronic medical problems. Records reviewed. Recent labs, xrays reviewed and medications reconciled.  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  Does have some nausea related to her medication.  They did increase her thyroid dose a few months ago.  Is still having trouble with incontinence, did have a urinary tract infection previously.  Not having any other symptoms now.  Depression is stable.  Hypothyroidism  This is a chronic problem. The current episode started more than 1 year ago. The problem occurs constantly. The problem has been unchanged. Associated symptoms include a change in bowel habit and fatigue. Pertinent negatives include no abdominal pain, arthralgias, chest pain, chills, congestion, coughing, fever, headaches, joint swelling, myalgias, nausea, neck pain, numbness, rash, sore throat, vomiting or weakness. Nothing aggravates the symptoms. Treatments tried: armour thyroid. The treatment provided moderate relief.      The following portions of the patient's history were reviewed and updated as appropriate: allergies, current medications, past social history and problem list.    Outpatient Medications Prior to Visit   Medication Sig Dispense Refill   • aspirin 81 MG EC tablet Take 81 mg by mouth Daily.     • entrectinib (ROZLYTREK) 200 MG capsule Take 600 mg by mouth Every Night.     • fluticasone (FLONASE) 50 MCG/ACT nasal spray 1 spray into each nostril Daily As Needed.     • levothyroxine (SYNTHROID, LEVOTHROID) 137 MCG tablet Take 137 mcg by mouth Daily.     • loperamide (IMODIUM) 2 MG capsule Take 2 mg by mouth As Needed for Diarrhea.     • loratadine (CLARITIN) 10 MG tablet Take 10 mg by mouth As Needed for Allergies.     • multivitamin with minerals tablet tablet  [facility]  Discharge- Juan Carlos Cleveland Jr. 1955, 68 y.o. male MRN: [id number]  Unit/Bed#: S -01 Encounter: [id number]  Primary Care Provider: Todd Nolasco DO   Date and time admitted to hospital: 7/26/2024 10:22 AM    * Chest pain  Assessment & Plan  Presented with chest pain/tightness between shoulders associated with dizziness and diaphoresis.  Chest pain with elevated troponin:-265  No significant ischemic ECG changes noted.   Patient received aspirin loading dose and ticagrelor in the ED  Cardiology was consulted and patient was started on heparin drip  Status postcardiac cath on 7/26/2024 revealing no significant CAD to suggest NSTEMI per cardiology  Echocardiogram without significant wall motion abnormalities or valvular abnormalities of concern.    Continue asa 81 mg daily, statin, and tricor on d/c    Elevated troponin  Assessment & Plan  See plan above    Cardiac murmur  Assessment & Plan  Systolic murmur noted likely aortic stenosis follow-up 2D echocardiogram  Soft systolic murmur heard on exam.  Echocardiogram revealed mild aortic stenosis, for further serial evaluation as an outpatient     Chronic bilateral low back pain without sciatica  Assessment & Plan  S/p lumbar laminectomy about 3 months ago  Recently returned to work.  Continue supportive care    Obesity (BMI 35.0-39.9 without comorbidity)  Assessment & Plan  Discussed lifestyle modifications  Discussed outpatient sleep study    Primary hypertension  Assessment & Plan  Continue home regimen on discharge per her cardiology recommendations    Type 2 diabetes mellitus without complication, without long-term current use of insulin (HCC)  Assessment & Plan  Lab Results   Component Value Date    HGBA1C 8.0 (H) 07/26/2024       Recent Labs     07/26/24  2128 07/26/24  2356 07/27/24  0826 07/27/24  1110   POCGLU 184* 140 143* 272*         Blood Sugar Average: Last 72 hrs:  (P) [id number]  Patient  Take 1 tablet by mouth Daily.     • multivitamins-minerals (PRESERVISION AREDS 2) capsule capsule Take 1 capsule by mouth 2 (Two) Times a Day.     • promethazine (PHENERGAN) 25 MG tablet Take 1 tablet by mouth Every 6 (Six) Hours As Needed for Nausea or Vomiting. 60 tablet 1   • rivaroxaban (XARELTO) 20 MG tablet Take 1 tablet by mouth Daily. After finished with lower dose for 21 days 90 tablet 3   • senna (SENOKOT) 8.6 MG tablet Take 1 tablet by mouth Daily. 2 tablets as needed 3 times a day x 30 days     • solifenacin (VESIcare) 10 MG tablet Take 1 tablet by mouth Daily. 90 tablet 0   • SUMAtriptan (IMITREX) 20 MG/ACT nasal spray INSTILL 1 SPRAY INTO EACH NOSTRIL EVERY 2 HOURS AS NEEDED FOR MIGRAINE 6 each 5   • topiramate (TOPAMAX) 50 MG tablet TAKE 1 TABLET BY MOUTH EVERY NIGHT. 90 tablet 3   • venlafaxine XR (EFFEXOR-XR) 37.5 MG 24 hr capsule TAKE 1 CAPSULE BY MOUTH EVERY DAY 90 capsule 3   • ondansetron (ZOFRAN) 8 MG tablet Take 8 mg by mouth Every 8 (Eight) Hours As Needed for Nausea or Vomiting.     • prochlorperazine (COMPAZINE) 10 MG tablet Take 10 mg by mouth Every 6 (Six) Hours As Needed for Nausea or Vomiting.     • Synthroid 125 MCG tablet TAKE 1 TABLET BY MOUTH EVERY DAY 90 tablet 0   • MAGNESIUM CITRATE PO Take 150 mg by mouth Every Night.     • sulfamethoxazole-trimethoprim (BACTRIM DS,SEPTRA DS) 800-160 MG per tablet Take 1 tablet by mouth 2 (Two) Times a Day. 14 tablet 0     No facility-administered medications prior to visit.       Review of Systems   Constitutional: Positive for fatigue. Negative for chills and fever.   HENT: Negative for congestion and sore throat.    Respiratory: Negative for cough.    Cardiovascular: Negative for chest pain.   Gastrointestinal: Positive for change in bowel habit. Negative for abdominal pain, nausea and vomiting.   Musculoskeletal: Negative for arthralgias, joint swelling, myalgias and neck pain.   Skin: Negative for rash.   Neurological: Negative for  takes Actoplus met 1 tablet daily  Will increase to 1 tablet twice daily  Endocrinology referral made on discharge    History of DVT (deep vein thrombosis)  Assessment & Plan  History of DVT after ankle injury.  Completed 6 months of anticoagulation with Eliquis  VTE prophylaxis while inpatient.        Medical Problems       Resolved Problems  Date Reviewed: 7/27/2024   None       Discharging Physician / Practitioner: Ana Mahmood DO  PCP: Todd Nolasco DO  Admission Date:   Admission Orders (From admission, onward)       Ordered        07/26/24 1410  INPATIENT ADMISSION  Once                          Discharge Date: 07/27/24    Consultations During Hospital Stay:  Cardiology    Procedures Performed:   Cardiac Cath 7/26/24  Left Main   The vessel was visualized by angiography, is large and is angiographically normal.      Left Anterior Descending   The vessel was visualized by angiography and is large. The vessel exhibits minimal luminal irregularities.      Left Circumflex   The vessel was visualized by angiography and is large. The vessel exhibits minimal luminal irregularities.      Right Coronary Artery   The vessel was visualized by angiography, is large and dominant. There is mild diffuse disease throughout the vessel.        TTE 7/26/24  Left Ventricle: Left ventricular cavity size is normal. Wall thickness is mildly increased. The left ventricular ejection fraction is 65%. Systolic function is normal. Wall motion is normal. Diastolic function is normal for age.  Left atrial filling pressure is normal.    Right Ventricle: Systolic function is normal.    Aortic Valve: The aortic valve is trileaflet. The leaflets are moderately calcified. There is mildly reduced mobility. There is mild stenosis. The aortic valve mean gradient is 15 mmHg. The aortic valve area is 1.83 cm2.    Mitral Valve: There is mild annular calcification.       Significant Findings / Test Results:   Elevated troponin  A1C 8.0    Incidental  "Findings:   See above   I reviewed the above mentioned incidental findings with the patient and/or family and they expressed understanding.    Test Results Pending at Discharge (will require follow up):   None     Outpatient Tests Requested:  A1C monitoring    Complications:  none    Reason for Admission: Chest pain    Hospital Course:   Juan Carlos Cleveland Jr. is a 68 y.o. male patient who originally presented to the hospital on 7/26/2024 due to chest pain and elevated troponin 83, 234, 265.  EKG was without significant ischemic findings.  Patient was loaded with aspirin and Brilinta and started on heparin drip.  Cardiac cath was performed by cardiology on 7/26/2024 with no significant CAD to suggest NSTEMI.  Echo was also without any significant wall motion abnormalities or valvular abnormalities.  Patient cleared by cardiology for discharge on home regimen of aspirin 81 mg daily, home antihypertensive regiment, statin, and Tricor. Patients home Actoplus met  mg was increased to BID dosing on discharge due to rising A1C to 8.0. Referral to endocrinology made on discharge. met was increased to                 Please see above list of diagnoses and related plan for additional information.     Condition at Discharge: stable    Discharge Day Visit / Exam:   Subjective:  Patient stats he feels well currently has no acute complaints.  Vitals: Blood Pressure: 124/65 (07/27/24 1108)  Pulse: 59 (07/27/24 1108)  Temperature: 98.4 °F (36.9 °C) (07/27/24 1108)  Temp Source: Oral (07/26/24 1026)  Respirations: 16 (07/26/24 1536)  Height: 6' 1\" (185.4 cm) (07/26/24 1500)  Weight - Scale: 115 kg (254 lb 3.1 oz) (07/27/24 0500)  SpO2: 98 % (07/27/24 1108)  Exam:   Physical Exam  Vitals and nursing note reviewed.   Constitutional:       General: He is not in acute distress.     Appearance: He is well-developed.   HENT:      Head: Normocephalic and atraumatic.   Eyes:      Conjunctiva/sclera: Conjunctivae normal. " "weakness, numbness and headaches.     I have reviewed 12 systems with patient. Findings were negative except what is noted below and/or in history of present illness.     Objective   Visit Vitals  /88 (BP Location: Left arm)   Pulse 92   Ht 167.6 cm (66\")   Wt 92 kg (202 lb 12.8 oz)   LMP  (LMP Unknown)   SpO2 98%   BMI 32.73 kg/m²     Physical Exam  Vitals and nursing note reviewed.   Constitutional:       General: She is not in acute distress.     Appearance: She is well-developed.   HENT:      Head: Normocephalic and atraumatic.      Nose: Nose normal.   Eyes:      General:         Right eye: No discharge.         Left eye: No discharge.      Conjunctiva/sclera: Conjunctivae normal.      Pupils: Pupils are equal, round, and reactive to light.   Neck:      Thyroid: No thyromegaly.   Cardiovascular:      Rate and Rhythm: Normal rate and regular rhythm.      Heart sounds: Normal heart sounds.   Pulmonary:      Effort: Pulmonary effort is normal.      Breath sounds: Normal breath sounds.   Lymphadenopathy:      Cervical: No cervical adenopathy.   Skin:     General: Skin is warm and dry.   Neurological:      Mental Status: She is alert and oriented to person, place, and time.         Notes brought forward are reviewed and updated if indicated.     Assessment/Plan   Problems Addressed this Visit        Endocrine and Metabolic    Acquired hypothyroidism - Primary (Chronic)    Relevant Medications    levothyroxine (SYNTHROID, LEVOTHROID) 137 MCG tablet       Hematology and Neoplasia    Adenosquamous carcinoma of right lung (HCC)      Other Visit Diagnoses     Other urinary incontinence        Relevant Orders    Urinalysis With Culture If Indicated -    Nausea        Relevant Medications    ondansetron (ZOFRAN) 8 MG tablet    prochlorperazine (COMPAZINE) 10 MG tablet    Reactive depression        Relevant Medications    prochlorperazine (COMPAZINE) 10 MG tablet      Diagnoses       Codes Comments    Acquired "   Cardiovascular:      Rate and Rhythm: Normal rate and regular rhythm.      Heart sounds: Murmur heard.   Pulmonary:      Effort: Pulmonary effort is normal. No respiratory distress.      Breath sounds: Normal breath sounds.   Abdominal:      Palpations: Abdomen is soft.      Tenderness: There is no abdominal tenderness.   Musculoskeletal:         General: No swelling.      Cervical back: Neck supple.   Skin:     General: Skin is warm and dry.   Neurological:      Mental Status: He is alert. Mental status is at baseline.   Psychiatric:         Mood and Affect: Mood normal.          Discussion with Family: Updated  (wife) at bedside.    Discharge instructions/Information to patient and family:   See after visit summary for information provided to patient and family.      Provisions for Follow-Up Care:  See after visit summary for information related to follow-up care and any pertinent home health orders.      Mobility at time of Discharge:   Basic Mobility Inpatient Raw Score: 24  JH-HLM Goal: 8: Walk 250 feet or more  JH-HLM Achieved: 7: Walk 25 feet or more  HLM Goal NOT achieved. Continue to encourage mobility in post discharge setting.     Disposition:   Home    Planned Readmission: no     Discharge Statement:  I spent 35 minutes discharging the patient. This time was spent on the day of discharge. I had direct contact with the patient on the day of discharge. Greater than 50% of the total time was spent examining patient, answering all patient questions, arranging and discussing plan of care with patient as well as directly providing post-discharge instructions.  Additional time then spent on discharge activities.    Discharge Medications:  See after visit summary for reconciled discharge medications provided to patient and/or family.      **Please Note: This note may have been constructed using a voice recognition system**       hypothyroidism    -  Primary ICD-10-CM: E03.9  ICD-9-CM: 244.9     Other urinary incontinence     ICD-10-CM: N39.498  ICD-9-CM: 788.39     Nausea     ICD-10-CM: R11.0  ICD-9-CM: 787.02     Adenosquamous carcinoma of right lung (HCC)     ICD-10-CM: C34.91  ICD-9-CM: 162.9     Reactive depression     ICD-10-CM: F32.9  ICD-9-CM: 300.4           Continue current medications.  Follow-up with oncology as scheduled.    New Medications Ordered This Visit   Medications   • ondansetron (ZOFRAN) 8 MG tablet     Sig: Take 1 tablet by mouth Every 8 (Eight) Hours As Needed for Nausea or Vomiting.     Dispense:  90 tablet     Refill:  1     Will call when needed   • prochlorperazine (COMPAZINE) 10 MG tablet     Sig: Take 1 tablet by mouth Every 6 (Six) Hours As Needed for Nausea or Vomiting.     Dispense:  120 tablet     Refill:  1     Will call when needed     Return in about 5 months (around 8/18/2022) for Recheck.        This document has been electronically signed by Jen Raymundo MD on March 18, 2022 17:33 CDT

## 2024-07-29 LAB
ATRIAL RATE: 65 BPM
P AXIS: 28 DEGREES
PR INTERVAL: 168 MS
QRS AXIS: -21 DEGREES
QRSD INTERVAL: 118 MS
QT INTERVAL: 408 MS
QTC INTERVAL: 424 MS
T WAVE AXIS: 13 DEGREES
VENTRICULAR RATE: 65 BPM

## 2024-07-29 PROCEDURE — 93010 ELECTROCARDIOGRAM REPORT: CPT | Performed by: INTERNAL MEDICINE

## 2024-07-29 NOTE — UTILIZATION REVIEW
NOTIFICATION OF ADMISSION DISCHARGE   This is a Notification of Discharge from [facility]. Please be advised that this patient has been discharge from our facility. Below you will find the admission and discharge date and time including the patient’s disposition.   UTILIZATION REVIEW CONTACT:  Kamala Miranda  Utilization   Network Utilization Review Department  Phone: [phone] x carefully listen to the prompts. All voicemails are confidential.  Email: [email or web address]     ADMISSION INFORMATION  PRESENTATION DATE: 7/26/2024 10:22 AM  OBERVATION ADMISSION DATE: N/A  INPATIENT ADMISSION DATE: 7/26/24  2:10 PM   DISCHARGE DATE: 7/27/2024  2:48 PM   DISPOSITION:Home/Self Care    Network Utilization Review Department  ATTENTION: Please call with any questions or concerns to [phone] and carefully listen to the prompts so that you are directed to the right person. All voicemails are confidential.   For Discharge needs, contact Care Management DC Support Team at [phone] opt. 2  Send all requests for admission clinical reviews, approved or denied determinations and any other requests to dedicated fax number below belonging to the campus where the patient is receiving treatment. List of dedicated fax numbers for the Facilities:  FACILITY NAME UR FAX NUMBER   ADMISSION DENIALS (Administrative/Medical Necessity) [phone]   DISCHARGE SUPPORT TEAM ([facility]) [phone]   PARENT CHILD HEALTH (Maternity/NICU/Pediatrics) [phone]   [facility] [phone]   [facility] [phone]   [facility] [phone]   [facility] [phone]   [facility] [phone]   [facility] [phone]   [facility] [phone]   [facility] [phone]    [facility] [phone]   [facility] [phone]   [facility] [phone]   [facility] [phone]

## 2024-08-01 ENCOUNTER — APPOINTMENT (EMERGENCY)
Dept: VASCULAR ULTRASOUND | Facility: HOSPITAL | Age: 69
DRG: 920 | End: 2024-08-01
Payer: COMMERCIAL

## 2024-08-01 ENCOUNTER — APPOINTMENT (INPATIENT)
Dept: RADIOLOGY | Facility: HOSPITAL | Age: 69
DRG: 920 | End: 2024-08-01
Payer: COMMERCIAL

## 2024-08-01 ENCOUNTER — HOSPITAL ENCOUNTER (INPATIENT)
Facility: HOSPITAL | Age: 69
LOS: 1 days | DRG: 920 | End: 2024-08-02
Attending: EMERGENCY MEDICINE | Admitting: INTERNAL MEDICINE
Payer: COMMERCIAL

## 2024-08-01 DIAGNOSIS — L03.314 CELLULITIS OF GROIN, RIGHT: Primary | ICD-10-CM

## 2024-08-01 DIAGNOSIS — T14.8XXA HEMATOMA: ICD-10-CM

## 2024-08-01 DIAGNOSIS — S30.1XXA HEMATOMA OF GROIN, INITIAL ENCOUNTER: ICD-10-CM

## 2024-08-01 DIAGNOSIS — N17.9 AKI (ACUTE KIDNEY INJURY) (HCC): ICD-10-CM

## 2024-08-01 PROBLEM — R50.9 FEVER: Status: ACTIVE | Noted: 2024-08-01

## 2024-08-01 LAB
ANION GAP SERPL CALCULATED.3IONS-SCNC: 13 MMOL/L (ref 4–13)
BASOPHILS # BLD AUTO: 0.03 THOUSANDS/ÂΜL (ref 0–0.1)
BASOPHILS NFR BLD AUTO: 0 % (ref 0–1)
BUN SERPL-MCNC: 32 MG/DL (ref 5–25)
CALCIUM SERPL-MCNC: 9.5 MG/DL (ref 8.4–10.2)
CHLORIDE SERPL-SCNC: 99 MMOL/L (ref 96–108)
CO2 SERPL-SCNC: 22 MMOL/L (ref 21–32)
CREAT SERPL-MCNC: 1.32 MG/DL (ref 0.6–1.3)
EOSINOPHIL # BLD AUTO: 0.15 THOUSAND/ÂΜL (ref 0–0.61)
EOSINOPHIL NFR BLD AUTO: 2 % (ref 0–6)
ERYTHROCYTE [DISTWIDTH] IN BLOOD BY AUTOMATED COUNT: 14.4 % (ref 11.6–15.1)
FLUAV RNA RESP QL NAA+PROBE: NEGATIVE
FLUBV RNA RESP QL NAA+PROBE: NEGATIVE
GFR SERPL CREATININE-BSD FRML MDRD: 55 ML/MIN/1.73SQ M
GLUCOSE SERPL-MCNC: 174 MG/DL (ref 65–140)
GLUCOSE SERPL-MCNC: 184 MG/DL (ref 65–140)
GLUCOSE SERPL-MCNC: 193 MG/DL (ref 65–140)
GLUCOSE SERPL-MCNC: 232 MG/DL (ref 65–140)
HCT VFR BLD AUTO: 38 % (ref 36.5–49.3)
HGB BLD-MCNC: 12.5 G/DL (ref 12–17)
IMM GRANULOCYTES # BLD AUTO: 0.04 THOUSAND/UL (ref 0–0.2)
IMM GRANULOCYTES NFR BLD AUTO: 0 % (ref 0–2)
LACTATE SERPL-SCNC: 2 MMOL/L (ref 0.5–2)
LYMPHOCYTES # BLD AUTO: 0.33 THOUSANDS/ÂΜL (ref 0.6–4.47)
LYMPHOCYTES NFR BLD AUTO: 4 % (ref 14–44)
MCH RBC QN AUTO: 30 PG (ref 26.8–34.3)
MCHC RBC AUTO-ENTMCNC: 32.9 G/DL (ref 31.4–37.4)
MCV RBC AUTO: 91 FL (ref 82–98)
MONOCYTES # BLD AUTO: 0.48 THOUSAND/ÂΜL (ref 0.17–1.22)
MONOCYTES NFR BLD AUTO: 5 % (ref 4–12)
NEUTROPHILS # BLD AUTO: 8.49 THOUSANDS/ÂΜL (ref 1.85–7.62)
NEUTS SEG NFR BLD AUTO: 89 % (ref 43–75)
NRBC BLD AUTO-RTO: 0 /100 WBCS
PLATELET # BLD AUTO: 251 THOUSANDS/UL (ref 149–390)
PLATELET # BLD AUTO: 316 THOUSANDS/UL (ref 149–390)
PMV BLD AUTO: 10.1 FL (ref 8.9–12.7)
PMV BLD AUTO: 10.2 FL (ref 8.9–12.7)
POTASSIUM SERPL-SCNC: 3.9 MMOL/L (ref 3.5–5.3)
PROCALCITONIN SERPL-MCNC: 0.15 NG/ML
RBC # BLD AUTO: 4.16 MILLION/UL (ref 3.88–5.62)
RSV RNA RESP QL NAA+PROBE: NEGATIVE
SARS-COV-2 RNA RESP QL NAA+PROBE: NEGATIVE
SODIUM SERPL-SCNC: 134 MMOL/L (ref 135–147)
WBC # BLD AUTO: 9.52 THOUSAND/UL (ref 4.31–10.16)

## 2024-08-01 PROCEDURE — 36415 COLL VENOUS BLD VENIPUNCTURE: CPT | Performed by: EMERGENCY MEDICINE

## 2024-08-01 PROCEDURE — 82948 REAGENT STRIP/BLOOD GLUCOSE: CPT

## 2024-08-01 PROCEDURE — 84145 PROCALCITONIN (PCT): CPT

## 2024-08-01 PROCEDURE — 85049 AUTOMATED PLATELET COUNT: CPT

## 2024-08-01 PROCEDURE — 93926 LOWER EXTREMITY STUDY: CPT | Performed by: SURGERY

## 2024-08-01 PROCEDURE — 96365 THER/PROPH/DIAG IV INF INIT: CPT

## 2024-08-01 PROCEDURE — 83605 ASSAY OF LACTIC ACID: CPT | Performed by: EMERGENCY MEDICINE

## 2024-08-01 PROCEDURE — 99285 EMERGENCY DEPT VISIT HI MDM: CPT

## 2024-08-01 PROCEDURE — 96368 THER/DIAG CONCURRENT INF: CPT

## 2024-08-01 PROCEDURE — 87186 SC STD MICRODIL/AGAR DIL: CPT | Performed by: EMERGENCY MEDICINE

## 2024-08-01 PROCEDURE — 87147 CULTURE TYPE IMMUNOLOGIC: CPT | Performed by: EMERGENCY MEDICINE

## 2024-08-01 PROCEDURE — 87154 CUL TYP ID BLD PTHGN 6+ TRGT: CPT | Performed by: EMERGENCY MEDICINE

## 2024-08-01 PROCEDURE — 99223 1ST HOSP IP/OBS HIGH 75: CPT | Performed by: INTERNAL MEDICINE

## 2024-08-01 PROCEDURE — 96366 THER/PROPH/DIAG IV INF ADDON: CPT

## 2024-08-01 PROCEDURE — 99255 IP/OBS CONSLTJ NEW/EST HI 80: CPT | Performed by: SURGERY

## 2024-08-01 PROCEDURE — 71046 X-RAY EXAM CHEST 2 VIEWS: CPT

## 2024-08-01 PROCEDURE — 80048 BASIC METABOLIC PNL TOTAL CA: CPT | Performed by: EMERGENCY MEDICINE

## 2024-08-01 PROCEDURE — 93926 LOWER EXTREMITY STUDY: CPT

## 2024-08-01 PROCEDURE — 0241U HB NFCT DS VIR RESP RNA 4 TRGT: CPT

## 2024-08-01 PROCEDURE — 85025 COMPLETE CBC W/AUTO DIFF WBC: CPT | Performed by: EMERGENCY MEDICINE

## 2024-08-01 PROCEDURE — 99285 EMERGENCY DEPT VISIT HI MDM: CPT | Performed by: EMERGENCY MEDICINE

## 2024-08-01 PROCEDURE — 87040 BLOOD CULTURE FOR BACTERIA: CPT | Performed by: EMERGENCY MEDICINE

## 2024-08-01 RX ORDER — ENOXAPARIN SODIUM 100 MG/ML
40 INJECTION SUBCUTANEOUS DAILY
Status: DISCONTINUED | OUTPATIENT
Start: 2024-08-01 | End: 2024-08-01

## 2024-08-01 RX ORDER — ACETAMINOPHEN 10 MG/ML
1000 INJECTION, SOLUTION INTRAVENOUS EVERY 6 HOURS PRN
Status: DISCONTINUED | OUTPATIENT
Start: 2024-08-01 | End: 2024-08-02 | Stop reason: HOSPADM

## 2024-08-01 RX ORDER — SODIUM CHLORIDE, SODIUM LACTATE, POTASSIUM CHLORIDE, CALCIUM CHLORIDE 600; 310; 30; 20 MG/100ML; MG/100ML; MG/100ML; MG/100ML
150 INJECTION, SOLUTION INTRAVENOUS CONTINUOUS
Status: DISCONTINUED | OUTPATIENT
Start: 2024-08-01 | End: 2024-08-01

## 2024-08-01 RX ORDER — EZETIMIBE 10 MG/1
10 TABLET ORAL DAILY
Status: DISCONTINUED | OUTPATIENT
Start: 2024-08-01 | End: 2024-08-02 | Stop reason: HOSPADM

## 2024-08-01 RX ORDER — TADALAFIL 5 MG/1
5 TABLET ORAL DAILY PRN
Status: DISCONTINUED | OUTPATIENT
Start: 2024-08-01 | End: 2024-08-01

## 2024-08-01 RX ORDER — INSULIN LISPRO 100 [IU]/ML
2-12 INJECTION, SOLUTION INTRAVENOUS; SUBCUTANEOUS
Status: DISCONTINUED | OUTPATIENT
Start: 2024-08-01 | End: 2024-08-02 | Stop reason: HOSPADM

## 2024-08-01 RX ORDER — ATORVASTATIN CALCIUM 40 MG/1
40 TABLET, FILM COATED ORAL
Status: DISCONTINUED | OUTPATIENT
Start: 2024-08-01 | End: 2024-08-02 | Stop reason: HOSPADM

## 2024-08-01 RX ORDER — SODIUM CHLORIDE, SODIUM LACTATE, POTASSIUM CHLORIDE, CALCIUM CHLORIDE 600; 310; 30; 20 MG/100ML; MG/100ML; MG/100ML; MG/100ML
125 INJECTION, SOLUTION INTRAVENOUS CONTINUOUS
Status: DISCONTINUED | OUTPATIENT
Start: 2024-08-01 | End: 2024-08-02

## 2024-08-01 RX ORDER — ACETAMINOPHEN 325 MG/1
650 TABLET ORAL ONCE
Status: COMPLETED | OUTPATIENT
Start: 2024-08-01 | End: 2024-08-01

## 2024-08-01 RX ORDER — AMLODIPINE BESYLATE 2.5 MG/1
5 TABLET ORAL DAILY
Status: DISCONTINUED | OUTPATIENT
Start: 2024-08-02 | End: 2024-08-01

## 2024-08-01 RX ORDER — AMLODIPINE BESYLATE 2.5 MG/1
5 TABLET ORAL DAILY
Status: DISCONTINUED | OUTPATIENT
Start: 2024-08-02 | End: 2024-08-02 | Stop reason: HOSPADM

## 2024-08-01 RX ORDER — IBUPROFEN 400 MG/1
400 TABLET ORAL ONCE
Status: COMPLETED | OUTPATIENT
Start: 2024-08-01 | End: 2024-08-01

## 2024-08-01 RX ORDER — EZETIMIBE 10 MG/1
10 TABLET ORAL DAILY
COMMUNITY

## 2024-08-01 RX ADMIN — IBUPROFEN 400 MG: 400 TABLET, FILM COATED ORAL at 13:34

## 2024-08-01 RX ADMIN — SODIUM CHLORIDE, SODIUM LACTATE, POTASSIUM CHLORIDE, AND CALCIUM CHLORIDE 150 ML/HR: .6; .31; .03; .02 INJECTION, SOLUTION INTRAVENOUS at 14:23

## 2024-08-01 RX ADMIN — ACETAMINOPHEN 650 MG: 325 TABLET, FILM COATED ORAL at 11:05

## 2024-08-01 RX ADMIN — ACETAMINOPHEN 1000 MG: 10 INJECTION INTRAVENOUS at 15:58

## 2024-08-01 RX ADMIN — EZETIMIBE 10 MG: 10 TABLET ORAL at 17:13

## 2024-08-01 RX ADMIN — VANCOMYCIN HYDROCHLORIDE 1750 MG: 5 INJECTION, POWDER, LYOPHILIZED, FOR SOLUTION INTRAVENOUS at 22:37

## 2024-08-01 RX ADMIN — INSULIN LISPRO 2 UNITS: 100 INJECTION, SOLUTION INTRAVENOUS; SUBCUTANEOUS at 18:50

## 2024-08-01 RX ADMIN — ENOXAPARIN SODIUM 40 MG: 40 INJECTION SUBCUTANEOUS at 16:00

## 2024-08-01 RX ADMIN — SODIUM CHLORIDE, SODIUM LACTATE, POTASSIUM CHLORIDE, AND CALCIUM CHLORIDE 125 ML/HR: .6; .31; .03; .02 INJECTION, SOLUTION INTRAVENOUS at 15:58

## 2024-08-01 RX ADMIN — ACETAMINOPHEN 1000 MG: 10 INJECTION INTRAVENOUS at 22:28

## 2024-08-01 RX ADMIN — CEFEPIME 2000 MG: 2 INJECTION, POWDER, FOR SOLUTION INTRAVENOUS at 10:34

## 2024-08-01 RX ADMIN — ASPIRIN 81 MG: 81 TABLET, COATED ORAL at 16:00

## 2024-08-01 RX ADMIN — CEFEPIME 2000 MG: 2 INJECTION, POWDER, FOR SOLUTION INTRAVENOUS at 18:53

## 2024-08-01 RX ADMIN — INSULIN LISPRO 2 UNITS: 100 INJECTION, SOLUTION INTRAVENOUS; SUBCUTANEOUS at 22:47

## 2024-08-01 RX ADMIN — ATORVASTATIN CALCIUM 40 MG: 40 TABLET, FILM COATED ORAL at 16:00

## 2024-08-01 RX ADMIN — SODIUM CHLORIDE, SODIUM LACTATE, POTASSIUM CHLORIDE, AND CALCIUM CHLORIDE 1000 ML: .6; .31; .03; .02 INJECTION, SOLUTION INTRAVENOUS at 10:34

## 2024-08-01 RX ADMIN — VANCOMYCIN HYDROCHLORIDE 2000 MG: 5 INJECTION, POWDER, LYOPHILIZED, FOR SOLUTION INTRAVENOUS at 11:09

## 2024-08-01 NOTE — CONSULTS
Consultation - Vascular Surgery  Juan Carlos Cleveland Jr. 68 y.o. male MRN: [id number]  Unit/Bed#: -01 Encounter: [id number]        Assessment & Plan   68 y.o. male with Hx of HTN, DM, DVT. 5-days of fever, chills, night sweats and 1 day of red, warm, tender lump in right groin at vascular access site. Cardiac cath done on 7/26. Febrile to 103.1F @ 1410, currently afebrile and rest of VS stable on RA. Right groin tender to palpation, blanching erythema with induration, no active drainage or fluctuance. Palp fem, DP & PT all 2+. R thigh and leg compartments are soft, denies numbness or tingling. Wbc 9.52, Hb 12.5, Cr 1.32. VAS duplex shows widely patent common femoral and external iliac arteries with no evidence of active pseudoaneurysm. There is also non-vascular structure at the groin measuring approximately 5.0 cm suggestive of hematoma. Concerns for infected groin hematoma.       Plan:  -IR drainage vs OR surgical drainage   -NPO midnight for possible intervention  -Serial exams of access site  -Trend Hb   -F/u blood cx  -Continue IV abx  -DVT ppx/anticoagulation per primary team      History of Present Illness     HPI:  Juan Carlos Cleveland Jr. is a 68 y.o. male who presents with infected groin hematoma s/p cardiac cath. 5-days of fever, chills, night sweats and 1 day of red, warm, tender lump in right groin at vascular access site. Cardiac cath done on 7/26.  Patient reports fevers were intermittent and and believed he may have had COVID which she tested negative for. Patient denies numbness and tingling in his right lower extremity or thigh.  Patient denies nausea vomiting chest pain or shortness of breath. Patient reports normal bowel function. Hx of HTN, DM, DVT.    Review of Systems   Constitutional:  Positive for chills and fever.   Respiratory:  Negative for shortness of breath.    Cardiovascular:  Negative for chest pain.   Gastrointestinal:  Negative for diarrhea, nausea and vomiting.       Historical  Information   Past Medical History:   Diagnosis Date    Diabetes mellitus (HCC)     boaderline    DVT (deep venous thrombosis) ([facility])     History of blood clots right leg      june 23 2020    Hypertension     PONV (postoperative nausea and vomiting)     nausea after shoulder surgery     Venous stasis dermatitis of right lower extremity 06/24/2020     Past Surgical History:   Procedure Laterality Date    APPENDECTOMY      BACK SURGERY      CARDIAC CATHETERIZATION N/A 7/26/2024    Procedure: Cardiac Coronary Angiogram;  Surgeon: Raymond Noriega MD;  [facility];  Service: Cardiology    COLONOSCOPY  11/19/2016    ESOPHAGOGASTRODUODENOSCOPY      ESOPHAGOGASTRODUODENOSCOPY N/A 10/26/2016    Procedure: ESOPHAGOGASTRODUODENOSCOPY (EGD);  Surgeon: Tito Rutherford MD;  Location: [facility];  Service:     TN COLONOSCOPY FLX DX W/COLLJ SPEC WHEN PFRMD N/A 11/19/2016    Procedure: COLONOSCOPY;  Surgeon: Sandor Hay MD;  Location: [facility];  Service: Gastroenterology    TN EGD BALLOON DILATION ESOPHAGUS <30 MM DIAM N/A 10/26/2016    Procedure: BALLOON DILATION ;  Surgeon: Tito Rutherford MD;  Location: [facility];  Service: Gastroenterology    TN SURGICAL ARTHROSCOPY SHOULDER W/ROTATOR CUFF RPR Left 04/21/2016    Procedure: LEFT SHOULDER ARTHROSCOPIC ROTATOR CUFF REPAIR, SUBACROMIAL DECOMPRESSION, BICEPS TENODESIS;  Surgeon: Amado Avelar MD;  Location: [facility];  Service: Orthopedics    TN TENDON SHEATH INCISION Right 11/15/2016    Procedure: INDEX TRIGGER FINGER RELEASE ;  Surgeon: Todd Silva MD;  Location: [facility];  Service: Orthopedics     Social History   Social History     Substance and Sexual Activity   Alcohol Use No     Social History     Substance and Sexual Activity   Drug Use No     Social History     Tobacco Use   Smoking Status Never   Smokeless Tobacco Never     Family History: non-contributory    Meds/Allergies   all medications and allergies reviewed  Allergies   Allergen Reactions     Tetanus Toxoid Abdominal Pain    Tetanus Toxoids     Tetanus-Diphtheria Toxoids Td Swelling    Fenofibrate Hives, Abdominal Pain and Rash     Generic        Objective   First Vitals:   Blood Pressure: 123/57 (08/01/24 1004)  Pulse: 76 (08/01/24 1004)  Temperature: 98.4 °F (36.9 °C) (08/01/24 1004)  Temp Source: Oral (08/01/24 1004)  Respirations: 20 (08/01/24 1004)  Height: 6' (182.9 cm) (08/01/24 1803)  SpO2: 98 % (08/01/24 1004)    Current Vitals:   Blood Pressure: 103/54 (08/01/24 1803)  Pulse: 66 (08/01/24 1803)  Temperature: 99.4 °F (37.4 °C) (08/01/24 1803)  Temp Source: Oral (08/01/24 1534)  Respirations: 18 (08/01/24 1803)  Height: 6' (182.9 cm) (08/01/24 1803)  SpO2: 96 % (08/01/24 1712)      Intake/Output Summary (Last 24 hours) at 8/1/2024 1924  Last data filed at 8/1/2024 1627  Gross per 24 hour   Intake 1830 ml   Output 200 ml   Net 1630 ml       Invasive Devices       Peripheral Intravenous Line  Duration             Peripheral IV 08/01/24 Distal;Right;Upper;Ventral (anterior) Arm <1 day    Peripheral IV 08/01/24 Right;Ventral (anterior) Wrist <1 day                    Physical Exam:  General: No acute distress  Neuro: Awake, Alert and Oriented x 3  HEENT:  Normocephalic, atraumatic, moist mucous membranes  CV: Regular rate and rhythm  Lungs: Normal work of breathing, no increased respiratory effort  Abdomen: Soft, non-tender, non-distended.  Groin: Right groin tender to palpation, blanching erythema with induration, no active drainage or fluctuance.   Extremities: Palp fem, DP & PT all 2+. R thigh and leg compartments are soft  Skin: Warm, dry      Lab Results: I have personally reviewed pertinent lab results.  , CBC:   Lab Results   Component Value Date    WBC 9.52 08/01/2024    HGB 12.5 08/01/2024    HCT 38.0 08/01/2024    MCV 91 08/01/2024     08/01/2024    RBC 4.16 08/01/2024    MCH 30.0 08/01/2024    MCHC 32.9 08/01/2024    RDW 14.4 08/01/2024    MPV 10.1 08/01/2024    NRBC 0 08/01/2024    , CMP:   Lab Results   Component Value Date    SODIUM 134 (L) 08/01/2024    K 3.9 08/01/2024    CL 99 08/01/2024    CO2 22 08/01/2024    BUN 32 (H) 08/01/2024    CREATININE 1.32 (H) 08/01/2024    CALCIUM 9.5 08/01/2024    EGFR 55 08/01/2024     Imaging: I have personally reviewed pertinent reports.    EKG, Pathology, and Other Studies: I have personally reviewed pertinent reports.      Code Status: Level 1 - Full Code  Advance Directive and Living Will:      Power of :    POLST:      Yandel Parker MD  General Surgery Resident

## 2024-08-01 NOTE — ED PROVIDER NOTES
History  Chief Complaint   Patient presents with    Wound Check     Had a cath procedure last Friday. Has been having intermittent fevers, highest 102F since then. Today comes in w/ c/o swelling, tenderness/redness in right femoral insertion site.      68 YR MALE - S/P RIGHT FEMORAL CARDIAC CATH 7/26-- SINCE WITH INCREASING PAIN/SWELLING/ REDNESS OF AREAS WITH FEVER /CHILLS OVER LAST 24 HRS-- NO OTHER COMPS- NO RLE WEAKNESS/LOSS OF SENSATION       History provided by:  Patient and spouse   used: No    Wound Check         Prior to Admission Medications   Prescriptions Last Dose Informant Patient Reported? Taking?   Multiple Vitamins-Minerals (CENTRUM SILVER PO) 7/31/2024 Self Yes Yes   Sig: Take 1 tablet by mouth daily.   Tricor 145 MG tablet Not Taking Self Yes No   Sig: Take 145 mg by mouth daily   Patient not taking: Reported on 8/1/2024   aspirin (ECOTRIN LOW STRENGTH) 81 mg EC tablet 8/1/2024  No Yes   Sig: Take 1 tablet (81 mg total) by mouth daily   bisacodyl (DULCOLAX) 5 mg EC tablet   No No   Sig: Take 2 tablets (10 mg total) by mouth once for 1 dose   ezetimibe (ZETIA) 10 mg tablet 7/31/2024  Yes Yes   Sig: Take 10 mg by mouth daily   lisinopril-hydrochlorothiazide (PRINZIDE,ZESTORETIC) 20-25 MG per tablet 8/1/2024 Self Yes Yes   Sig: Take 1 tablet by mouth daily.   pioglitazone-metFORMIN (ACTOPLUS MET)  MG per tablet 8/1/2024  No Yes   Sig: Take 1 tablet by mouth 2 (two) times a day with meals   rosuvastatin (CRESTOR) 20 MG tablet 7/31/2024 Self Yes Yes   tadalafil (CIALIS) 5 MG tablet 7/31/2024 Self Yes Yes   Sig: Take 5 mg by mouth daily as needed for erectile dysfunction.      Facility-Administered Medications: None       Past Medical History:   Diagnosis Date    Diabetes mellitus (HCC)     boaderline    DVT (deep venous thrombosis) ([facility] [place])     History of blood clots right leg      june 23 2020    Hypertension     PONV (postoperative nausea and vomiting)     nausea after  shoulder surgery     Venous stasis dermatitis of right lower extremity 06/24/2020       Past Surgical History:   Procedure Laterality Date    APPENDECTOMY      BACK SURGERY      CARDIAC CATHETERIZATION N/A 7/26/2024    Procedure: Cardiac Coronary Angiogram;  Surgeon: Raymond Noriega MD;  [facility];  Service: Cardiology    COLONOSCOPY  11/19/2016    ESOPHAGOGASTRODUODENOSCOPY      ESOPHAGOGASTRODUODENOSCOPY N/A 10/26/2016    Procedure: ESOPHAGOGASTRODUODENOSCOPY (EGD);  Surgeon: Tito Rutherford MD;  Location: [facility];  Service:     ND COLONOSCOPY FLX DX W/COLLJ SPEC WHEN PFRMD N/A 11/19/2016    Procedure: COLONOSCOPY;  Surgeon: Sandor Hay MD;  [facility]: [facility];  Service: Gastroenterology    ND EGD BALLOON DILATION ESOPHAGUS <30 MM DIAM N/A 10/26/2016    Procedure: BALLOON DILATION ;  Surgeon: Tito Rutherford MD;  [facility]: [facility];  Service: Gastroenterology    ND SURGICAL ARTHROSCOPY SHOULDER W/ROTATOR CUFF RPR Left 04/21/2016    Procedure: LEFT SHOULDER ARTHROSCOPIC ROTATOR CUFF REPAIR, SUBACROMIAL DECOMPRESSION, BICEPS TENODESIS;  Surgeon: Amado Avelar MD;  Location[facility];  Service: Orthopedics    ND TENDON SHEATH INCISION Right 11/15/2016    Procedure: INDEX TRIGGER FINGER RELEASE ;  Surgeon: Todd Silva MD;  [facility];  Service: Orthopedics       History reviewed. No pertinent family history.  I have reviewed and agree with the history as documented.    E-Cigarette/Vaping    E-Cigarette Use Never User      E-Cigarette/Vaping Substances    Nicotine No     THC No     CBD No     Flavoring No     Other No     Unknown No      Social History     Tobacco Use    Smoking status: Never    Smokeless tobacco: Never   Vaping Use    Vaping status: Never Used   Substance Use Topics    Alcohol use: No    Drug use: No       Review of Systems   Constitutional:  Positive for chills, fatigue and fever. Negative for activity change, appetite change, diaphoresis and unexpected weight  change.   HENT: Negative.     Eyes: Negative.    Respiratory: Negative.     Cardiovascular: Negative.    Gastrointestinal: Negative.    Endocrine: Negative.    Genitourinary: Negative.    Musculoskeletal: Negative.    Skin:  Positive for wound. Negative for color change, pallor and rash.         R GROIN PAIN/REDNESS AT CARDIAC CATH SITE    Allergic/Immunologic: Negative.    Neurological: Negative.    Hematological: Negative.    Psychiatric/Behavioral: Negative.         Physical Exam  Physical Exam  Vitals and nursing note reviewed.   Constitutional:       General: He is not in acute distress.     Appearance: Normal appearance. He is ill-appearing. He is not toxic-appearing or diaphoretic.      Comments: AVSS- BECAME FEBRILE IN ER -  PULSE OX 97 % ON RA- INTERPRETATION IS NORMAL- NO INTERVENTION - NON TOXIC    HENT:      Head: Normocephalic and atraumatic.      Nose: Nose normal.      Mouth/Throat:      Mouth: Mucous membranes are moist.   Eyes:      General: No scleral icterus.        Right eye: No discharge.         Left eye: No discharge.      Extraocular Movements: Extraocular movements intact.      Conjunctiva/sclera: Conjunctivae normal.      Pupils: Pupils are equal, round, and reactive to light.      Comments: MM PINK   Neck:      Vascular: No carotid bruit.      Comments: NO PMT C/T/L/S SPINE   Cardiovascular:      Rate and Rhythm: Normal rate and regular rhythm.      Pulses: Normal pulses.      Heart sounds: Murmur heard.      No friction rub. No gallop.   Pulmonary:      Effort: Pulmonary effort is normal. No respiratory distress.      Breath sounds: Normal breath sounds. No stridor. No wheezing, rhonchi or rales.   Chest:      Chest wall: No tenderness.   Abdominal:      General: Bowel sounds are normal. There is no distension.      Palpations: Abdomen is soft. There is no mass.      Tenderness: There is no abdominal tenderness. There is no right CVA tenderness, left CVA tenderness, guarding or rebound.       Hernia: No hernia is present.      Comments: SOFT NT/ND- NO HSM - NO CVA TENDERNESS- NO ASCITIES- NO PERITONEAL SIGNS    Genitourinary:     Comments: NORMAL TESTICLE/SCROTAL/PERINEAL EXAM   Musculoskeletal:         General: Tenderness present. No swelling, deformity or signs of injury. Normal range of motion.      Cervical back: Normal range of motion and neck supple. No rigidity or tenderness.      Right lower leg: No edema.      Left lower leg: No edema.      Comments: R FEMORAL AREA AT CATH SITE- WITH PALM SIZED AREA OF REDNESS/ WARMTH/TENDERNESS- NO FLUCTUANCE- NO CREPITUS/NECROSIS- NO PULSATILE FEMORAL MASS- NORMAL RLE DISTAL PULSE- SENSATION/STRENGTH/ROM / CAP REFILL    Lymphadenopathy:      Cervical: No cervical adenopathy.   Skin:     General: Skin is warm.      Capillary Refill: Capillary refill takes less than 2 seconds.      Coloration: Skin is not jaundiced or pale.      Findings: Erythema present. No bruising, lesion or rash.      Comments: SEE R FEMORAL DESCRIPTION ABOVE    Neurological:      General: No focal deficit present.      Mental Status: He is alert and oriented to person, place, and time.      Cranial Nerves: No cranial nerve deficit.      Sensory: No sensory deficit.      Motor: No weakness.      Coordination: Coordination normal.      Gait: Gait normal.      Comments: NORMAL NON FOCAL NEURO EXAM    Psychiatric:         Mood and Affect: Mood normal.         Behavior: Behavior normal.         Thought Content: Thought content normal.         Judgment: Judgment normal.         Vital Signs  ED Triage Vitals   Temperature Pulse Respirations Blood Pressure SpO2   08/01/24 1004 08/01/24 1004 08/01/24 1004 08/01/24 1004 08/01/24 1004   98.4 °F (36.9 °C) 76 20 123/57 98 %      Temp Source Heart Rate Source Patient Position - Orthostatic VS BP Location FiO2 (%)   08/01/24 1004 08/01/24 1004 08/01/24 1004 08/01/24 1004 --   Oral Monitor Lying Right arm       Pain Score       08/01/24 1105        Med Not Given for Pain - for MAR use only           Vitals:    08/01/24 1130 08/01/24 1300 08/01/24 1400 08/01/24 1500   BP: 117/55 117/71 147/64 110/55   Pulse: 76 74 79 78   Patient Position - Orthostatic VS: Lying Sitting Sitting          Visual Acuity      ED Medications  Medications   lactated ringers infusion (150 mL/hr Intravenous New Bag 8/1/24 1423)   vancomycin (VANCOCIN) 2,000 mg in sodium chloride 0.9 % 500 mL IVPB (0 mg Intravenous Stopped 8/1/24 1405)   cefepime (MAXIPIME) 2 g/50 mL dextrose IVPB (0 mg Intravenous Stopped 8/1/24 1104)   lactated ringers bolus 1,000 mL (0 mL Intravenous Stopped 8/1/24 1420)   acetaminophen (TYLENOL) tablet 650 mg (650 mg Oral Given 8/1/24 1105)   ibuprofen (MOTRIN) tablet 400 mg (400 mg Oral Given 8/1/24 1334)       Diagnostic Studies  Results Reviewed       Procedure Component Value Units Date/Time    Blood culture #1 [[id number]] Collected: 08/01/24 1015    Lab Status: In process Specimen: Blood from Arm, Right Updated: 08/01/24 1227    Blood culture #2 [[id number]] Collected: 08/01/24 1015    Lab Status: In process Specimen: Blood from Hand, Right Updated: 08/01/24 1227    Lactic acid, plasma (w/reflex if result > 2.0) [[id number]]  (Normal) Collected: 08/01/24 1026    Lab Status: Final result Specimen: Blood from Arm, Right Updated: 08/01/24 1059     LACTIC ACID 2.0 mmol/L     Narrative:      Result may be elevated if tourniquet was used during collection.    Basic metabolic panel [[id number]]  (Abnormal) Collected: 08/01/24 1026    Lab Status: Final result Specimen: Blood from Arm, Right Updated: 08/01/24 1058     Sodium 134 mmol/L      Potassium 3.9 mmol/L      Chloride 99 mmol/L      CO2 22 mmol/L      ANION GAP 13 mmol/L      BUN 32 mg/dL      Creatinine 1.32 mg/dL      Glucose 232 mg/dL      Calcium 9.5 mg/dL      eGFR 55 ml/min/1.73sq m     Narrative:      National Kid[employer] Disease Foundation guidelines for Chronic Kidney Disease (CKD):     Stage 1 with normal or  high GFR (GFR > 90 mL/min/1.73 square meters)    Stage 2 Mild CKD (GFR = 60-89 mL/min/1.73 square meters)    Stage 3A Moderate CKD (GFR = 45-59 mL/min/1.73 square meters)    Stage 3B Moderate CKD (GFR = 30-44 mL/min/1.73 square meters)    Stage 4 Severe CKD (GFR = 15-29 mL/min/1.73 square meters)    Stage 5 End Stage CKD (GFR <15 mL/min/1.73 square meters)  Note: GFR calculation is accurate only with a steady state creatinine    CBC and differential [[id number]]  (Abnormal) Collected: 08/01/24 1026    Lab Status: Final result Specimen: Blood from Arm, Right Updated: 08/01/24 1036     WBC 9.52 Thousand/uL      RBC 4.16 Million/uL      Hemoglobin 12.5 g/dL      Hematocrit 38.0 %      MCV 91 fL      MCH 30.0 pg      MCHC 32.9 g/dL      RDW 14.4 %      MPV 10.2 fL      Platelets 316 Thousands/uL      nRBC 0 /100 WBCs      Segmented % 89 %      Immature Grans % 0 %      Lymphocytes % 4 %      Monocytes % 5 %      Eosinophils Relative 2 %      Basophils Relative 0 %      Absolute Neutrophils 8.49 Thousands/µL      Absolute Immature Grans 0.04 Thousand/uL      Absolute Lymphocytes 0.33 Thousands/µL      Absolute Monocytes 0.48 Thousand/µL      Eosinophils Absolute 0.15 Thousand/µL      Basophils Absolute 0.03 Thousands/µL                    VAS DUPLEX EVALUATION FOR PSEUDOANEURYSM    (Results Pending)              Procedures  Procedures         ED Course  ED Course as of 08/01/24 1522   Thu Aug 01, 2024   1124 -ER MD NOTE- RECENT CARDIAC ECHO/LABS- CARDIAC CATH REPORT ALL REVIEWED BY ER MD   1125 ER MD PROCEDURE NOTE-  FOR RIGHT INGUINAL/PUBIC ST U/S- NO FLUID COLLECTION SEEN    1125 ER MD NOTE- PT AGAIN OFFERED PAIN MEDICATION ON RE-EXAM -- REFUSES- WILL CONT TO RE-EVAL   1208 ER MD NOTE- PT RE-EVAL- FEELS IMPROVED WITH CHILLS/PAIN WANTS NOTHING FURTHER FOR  PAIN AT PRESENT - WILL DI/W SLIM FOR ADMIT- PENDING U/S   1320 RLE U/S THRU TECH NO PSEUDOANEURYSM - NO FLUID COLLECTION - 3 CM HEMATOMA- POS LYMPHADENOPATHY                                   SBIRT 20yo+      Flowsheet Row Most Recent Value   Initial Alcohol Screen: US AUDIT-C     1. How often do you have a drink containing alcohol? 0 Filed at: 08/01/2024 1008   2. How many drinks containing alcohol do you have on a typical day you are drinking?  0 Filed at: 08/01/2024 1008   3b. FEMALE Any Age, or MALE 65+: How often do you have 4 or more drinks on one occassion? 0 Filed at: 08/01/2024 1008   Audit-C Score 0 Filed at: 08/01/2024 1008   MALLORIE: How many times in the past year have you...    Used an illegal drug or used a prescription medication for non-medical reasons? Never Filed at: 08/01/2024 1008                      Medical Decision Making  Pt with wound infection  with systemic symptoms from recent r groin cardiac cath -- at present no abscess on u/s-- pt will need admit iv antibx and supportive care     Problems Addressed:  AHMET (acute kidney injury) (HCC): acute illness or injury     Details: See chart   Cellulitis of groin, right: acute illness or injury with systemic symptoms     Details: See chart   Hematoma of groin, initial encounter: acute illness or injury     Details: See chart     Amount and/or Complexity of Data Reviewed  Independent Historian: spouse  External Data Reviewed: labs, radiology and notes.     Details: All reviewed by er md   Labs: ordered. Decision-making details documented in ED Course.     Details: All reviewed and compared by er md   Radiology: ordered. Decision-making details documented in ED Course.     Details: Reports reviewed by er md   Discussion of management or test interpretation with external provider(s): Moderate amount of er md thought complexity and workup     Risk  OTC drugs.  Prescription drug management.  Decision regarding hospitalization.                 Disposition  Final diagnoses:   Cellulitis of groin, right   AHMET (acute kidney injury) (HCC)   Hematoma of groin, initial encounter     Time reflects when diagnosis was  documented in both MDM as applicable and the Disposition within this note       Time User Action Codes Description Comment    8/1/2024 12:31 PM Jose Juan Rutledge [L03.314] Cellulitis of groin, right     8/1/2024 12:31 PM Jose Juan Rutledge [N17.9] AHMET (acute kidney injury) (HCC)     8/1/2024  1:22 PM Jose Juan Rutledge [S30.1XXA] Hematoma of groin, initial encounter           ED Disposition       ED Disposition   Admit    Condition   Stable    Date/Time   u Aug 1, 2024 1323    Comment   Case was discussed with DR. LEIGH and the patient's admission status was agreed to be Admission Status: inpatient status to the service of Dr. LEIGH .               Follow-up Information    None         Patient's Medications   Discharge Prescriptions    No medications on file       No discharge procedures on file.    PDMP Review       None            ED Provider  Electronically Signed by             Jose Juan Rutledge MD  08/05/24 9899

## 2024-08-01 NOTE — ASSESSMENT & PLAN NOTE
Patient with history of fever and chills since last 5 days.  Also experienced night sweats overnight.  1 bout history of cough today.  Had a cardiac cath recently.  Noticed a lump in his groin at the cath site which was red, warm, tender.  Tmax 103 °F noted at home (    Plan:  Continue cefepime and vancomycin.  Broad-spectrum antibiotic for now given recent instrumentation and possible infective source being infected hematoma.  Follow-up on blood culture.  Monitor fever curve, white count curve.  Check COVID flu RSV, chest x-ray.

## 2024-08-01 NOTE — PROGRESS NOTES
Vancomycin IV Pharmacy-to-Dose Consultation    Juan Carlos Cleveland Jr. is a 68 y.o. male who is currently receiving Vancomycin IV with management by the Pharmacy Consult service.    Relevant clinical data and objective / subjective history reviewed.    Vancomycin Assessment:    Indication and Goal AUC/Trough: Soft tissue (goal -600, trough >10)      Micro:         Renal Function:  SCr: 1.32  mg/dL    CrCl: 70.3  mL/min    Renal replacement: Not on dialysis    Days of Therapy: 1    Current Dose:  2000 mg IV x 1 load       Vancomycin Plan:    New Dosin mg IV q24h    Estimated AUC:  489 mcg*hr/mL    Estimated Trough:  12.8 mcg/mL    Next Level: 2024 @ 0600    Renal Function Monitoring: Daily BMP and UOP    Pharmacy will continue to follow closely for s/sx of nephrotoxicity, infusion reactions and appropriateness of therapy.  BMP and CBC will be ordered per protocol. We will continue to follow the patient's culture results and clinical progress daily.    Gin Estes, Pharmacist

## 2024-08-01 NOTE — H&P
"[facility]  H&P  Name: Juan Carlos Cleveland Jr. 68 y.o. male I MRN: [id number]  Unit/Bed#: ED-10 I Date of Admission: 8/1/2024   Date of Service: 8/1/2024  Hospital Day: 0      Assessment & Plan   * Fever  Assessment & Plan  Patient with history of fever and chills since last 5 days.  Also experienced night sweats overnight.  1 bout history of cough today.  Had a cardiac cath recently.  Noticed a lump in his groin at the cath site which was red, warm, tender.  Tmax 103 °F noted at home (    Plan:  Continue cefepime and vancomycin.  Broad-spectrum antibiotic for now given recent instrumentation and possible infective source being infected hematoma.  Follow-up on blood culture.  Monitor fever curve, white count curve.  Check COVID flu RSV, chest x-ray.    Hematoma  Assessment & Plan  Patient underwent cardiac catheterization on 7/26.  Today when the patient remove the bandage, he red, warm, tender lump in the cath site.  Has been having fever since last 5 days with Tmax of 103 °F as noted at home.  Cardiology made aware.  Will consider IR consult for hematoma aspiration for source control.    Hypertension  Assessment & Plan  On home medication of lisinopril hydrochlorothiazide.  Blood pressure is well-controlled at this time.  Given elevated creatinine, hold off on lisinopril hydrochlorothiazide.  In the interim, start amlodipine 5 mg daily.  Monitor vitals, adjust dosing as needed.    Type 2 diabetes mellitus (HCC)  Assessment & Plan  Lab Results   Component Value Date    HGBA1C 8.0 (H) 07/26/2024       No results for input(s): \"POCGLU\" in the last 72 hours.    Blood Sugar Average: Last 72 hrs:    POA blood glucose 272.  Home medications include pioglitazone metformin combination.  Hold home medication during hospitalization in light of elevated creatinine.  Monitor blood glucose, insulin per sliding scale [facility]    Elevated serum creatinine  Assessment & Plan  Recent Labs     08/01/24  1026 "   CREATININE 1.32*   EGFR 55     Estimated Creatinine Clearance: 71.1 mL/min (A) (by C-G formula based on SCr of 1.32 mg/dL (H)).     Patient with baseline creatinine ranging between 1.9 and 1.2.  POA creatinine of 1.32.   Possibly in the setting of poor p.o. intake.  Continue with maintenance fluid at 125 cc/h.  Monitor BMP.           VTE Pharmacologic Prophylaxis: VTE Score: 4 Moderate Risk (Score 3-4) - Pharmacological DVT Prophylaxis Ordered: enoxaparin (Lovenox).  Code Status: Level 1 - Full Code   Discussion with family: Updated  (wife) at bedside.    Anticipated Length of Stay: Patient will be admitted on an inpatient basis with an anticipated length of stay of greater than 2 midnights secondary to infected hematoma.    Chief Complaint: Fever    History of Present Illness:  Juan Carlos Cleveland Jr. is a 68 y.o. male with a PMH of hypertension, diabetes, DVT who presents with 5-day history of fever, 1 day history of red, warm, tender lump in groin.  Patient was recently in the hospital and had a cardiac cath done on 7/26.  Since Sunday, he has been having subjective feeling of fever and then documented history of fever, chills, night sweats.  Tmax at home was documented to be 103 °F.  He also had a temperature more than 100.4 on multiple occasions within this last 5 days.  During the same time he endorses poor p.o. intake.  This morning when he woke up, he took off the bandage from the cath site and noted that his groin was red, hot, and there was a tender lump present.  He also endorses mild headaches, does not endorse any sick contacts, cough, sputum production, diarrhea, constipation, lower urinary tract symptoms.  He also denies any chest pain, shortness of breath, pain abdomen, constipation, nausea, vomiting.    Review of Systems:  Review of Systems as mentioned in the HPI.    Past Medical and Surgical History:   Past Medical History:   Diagnosis Date    Diabetes mellitus (HCC)     boaderline     DVT (deep venous thrombosis) (HCC)     History of blood clots right leg      june 23 2020    Hypertension     PONV (postoperative nausea and vomiting)     nausea after shoulder surgery     Venous stasis dermatitis of right lower extremity 06/24/2020       Past Surgical History:   Procedure Laterality Date    APPENDECTOMY      BACK SURGERY      CARDIAC CATHETERIZATION N/A 7/26/2024    Procedure: Cardiac Coronary Angiogram;  Surgeon: Raymond Noriega MD;  [facility];  Service: Cardiology    COLONOSCOPY  11/19/2016    ESOPHAGOGASTRODUODENOSCOPY      ESOPHAGOGASTRODUODENOSCOPY N/A 10/26/2016    Procedure: ESOPHAGOGASTRODUODENOSCOPY (EGD);  Surgeon: Tito Rutherford MD;  Location: [facility];  Service:     HI COLONOSCOPY FLX DX W/COLLJ SPEC WHEN PFRMD N/A 11/19/2016    Procedure: COLONOSCOPY;  Surgeon: Sandor Hay MD;  Location: [facility];  Service: Gastroenterology    HI EGD BALLOON DILATION ESOPHAGUS <30 MM DIAM N/A 10/26/2016    Procedure: BALLOON DILATION ;  Surgeon: Tito Rutherford MD;  Location: [facility];  Service: Gastroenterology    HI SURGICAL ARTHROSCOPY SHOULDER W/ROTATOR CUFF RPR Left 04/21/2016    Procedure: LEFT SHOULDER ARTHROSCOPIC ROTATOR CUFF REPAIR, SUBACROMIAL DECOMPRESSION, BICEPS TENODESIS;  Surgeon: Amado Avelar MD;  Location: [facility];  Service: Orthopedics    HI TENDON SHEATH INCISION Right 11/15/2016    Procedure: INDEX TRIGGER FINGER RELEASE ;  Surgeon: Todd Silva MD;  Location[facility];  Service: Orthopedics       Meds/Allergies:  Prior to Admission medications    Medication Sig Start Date End Date Taking? Authorizing Provider   aspirin (ECOTRIN LOW STRENGTH) 81 mg EC tablet Take 1 tablet (81 mg total) by mouth daily 7/27/24 7/22/25 Yes Ana Mahmood DO   ezetimibe (ZETIA) 10 mg tablet Take 10 mg by mouth daily   Yes Historical Provider, MD   lisinopril-hydrochlorothiazide (PRINZIDE,ZESTORETIC) 20-25 MG per tablet Take 1 tablet by mouth daily.   Yes Historical  Provider, MD   Multiple Vitamins-Minerals (CENTRUM SILVER PO) Take 1 tablet by mouth daily.   Yes Historical Provider, MD   pioglitazone-metFORMIN (ACTOPLUS MET)  MG per tablet Take 1 tablet by mouth 2 (two) times a day with meals 7/27/24 8/26/24 Yes Ana Mahmood,    rosuvastatin (CRESTOR) 20 MG tablet  3/28/20  Yes Historical Provider, MD   tadalafil (CIALIS) 5 MG tablet Take 5 mg by mouth daily as needed for erectile dysfunction.   Yes Historical Provider, MD   bisacodyl (DULCOLAX) 5 mg EC tablet Take 2 tablets (10 mg total) by mouth once for 1 dose 10/10/23 10/10/23  Sandor Hay MD   Tricor 145 MG tablet Take 145 mg by mouth daily  Patient not taking: Reported on 8/1/2024 9/16/21   Historical Provider, MD MONGE have reviewed home medications with patient personally.    Allergies:   Allergies   Allergen Reactions    Tetanus Toxoid Abdominal Pain    Tetanus Toxoids     Fenofibrate Hives, Abdominal Pain and Rash     Generic        Social History:  Marital Status: /Civil Union   Occupation: [employer]  Patient Pre-hospital Living Situation: Home  Patient Pre-hospital Level of Mobility: walks  Patient Pre-hospital Diet Restrictions: None  Substance Use History:   Social History     Substance and Sexual Activity   Alcohol Use No     Social History     Tobacco Use   Smoking Status Never   Smokeless Tobacco Never     Social History     Substance and Sexual Activity   Drug Use No       Family History:  History reviewed. No pertinent family history.    Physical Exam:     Vitals:   Blood Pressure: 99/52 (08/01/24 1534)  Pulse: 69 (08/01/24 1534)  Temperature: 99.8 °F (37.7 °C) (08/01/24 1534)  Temp Source: Oral (08/01/24 1534)  Respirations: 18 (08/01/24 1534)  SpO2: 98 % (08/01/24 1534)    Physical Exam  Vitals and nursing note reviewed.   Constitutional:       General: He is not in acute distress.     Appearance: He is well-developed. He is obese. He is ill-appearing.   HENT:      Head: Normocephalic and  atraumatic.   Eyes:      Conjunctiva/sclera: Conjunctivae normal.   Neck:      Vascular: No carotid bruit.   Cardiovascular:      Rate and Rhythm: Normal rate and regular rhythm.      Heart sounds: Murmur heard.   Pulmonary:      Effort: Pulmonary effort is normal. No respiratory distress.      Breath sounds: Normal breath sounds. No rhonchi or rales.   Chest:      Chest wall: No tenderness.   Abdominal:      General: There is no distension.      Palpations: Abdomen is soft. There is no mass.      Tenderness: There is no abdominal tenderness.   Musculoskeletal:         General: No swelling.      Cervical back: Neck supple. No rigidity.      Right lower leg: No edema.      Left lower leg: No edema.   Skin:     General: Skin is warm and dry.      Capillary Refill: Capillary refill takes less than 2 seconds.      Findings: Erythema present.      Comments: Skin overlying the groin on the right side is erythematous extending from anterior aspect of her thigh to the right side of the perineum.  Overlying skin is warm to touch and there is a lump about 5 to 6 cm in maximum diameter, noted tenderness on palpation.  Pain noticed on abducting the right hip during flexion at the hip joint.   Neurological:      Mental Status: He is alert.   Psychiatric:         Mood and Affect: Mood normal.          Additional Data:     Lab Results:  Results from last 7 days   Lab Units 08/01/24  1539 08/01/24  1026   WBC Thousand/uL  --  9.52   HEMOGLOBIN g/dL  --  12.5   HEMATOCRIT %  --  38.0   PLATELETS Thousands/uL 251 316   SEGS PCT %  --  89*   LYMPHO PCT %  --  4*   MONO PCT %  --  5   EOS PCT %  --  2     Results from last 7 days   Lab Units 08/01/24  1026 07/27/24  0455 07/26/24  1052   SODIUM mmol/L 134*   < > 136   POTASSIUM mmol/L 3.9   < > 3.8   CHLORIDE mmol/L 99   < > 104   CO2 mmol/L 22   < > 21   BUN mg/dL 32*   < > 33*   CREATININE mg/dL 1.32*   < > 1.24   ANION GAP mmol/L 13   < > 11   CALCIUM mg/dL 9.5   < > 9.7   ALBUMIN  g/dL  --   --  4.1   TOTAL BILIRUBIN mg/dL  --   --  0.74   ALK PHOS U/L  --   --  48   ALT U/L  --   --  19   AST U/L  --   --  19   GLUCOSE RANDOM mg/dL 232*   < > 259*    < > = values in this interval not displayed.         Results from last 7 days   Lab Units 07/27/24  1110 07/27/24  0826 07/26/24  2356 07/26/24  2128 07/26/24  1600 07/26/24  1031   POC GLUCOSE mg/dl 272* 143* 140 184* 165* 253*     Lab Results   Component Value Date    HGBA1C 8.0 (H) 07/26/2024    HGBA1C 6.3 (H) 12/22/2018     Results from last 7 days   Lab Units 08/01/24  1026   LACTIC ACID mmol/L 2.0   PROCALCITONIN ng/ml 0.15       Lines/Drains:  Invasive Devices       Peripheral Intravenous Line  Duration             Peripheral IV 08/01/24 Distal;Right;Upper;Ventral (anterior) Arm <1 day    Peripheral IV 08/01/24 Right;Ventral (anterior) Wrist <1 day                        Imaging: No pertinent imaging reviewed.  VAS DUPLEX EVALUATION FOR PSEUDOANEURYSM    (Results Pending)   XR chest pa & lateral    (Results Pending)       EKG and Other Studies Reviewed on Admission:   EKG: NSR. HR 76.    ** Please Note: This note has been constructed using a voice recognition system. **

## 2024-08-01 NOTE — ASSESSMENT & PLAN NOTE
Recent Labs     08/01/24  1026   CREATININE 1.32*   EGFR 55     Estimated Creatinine Clearance: 71.1 mL/min (A) (by C-G formula based on SCr of 1.32 mg/dL (H)).     Patient with baseline creatinine ranging between 1.9 and 1.2.  POA creatinine of 1.32.   Possibly in the setting of poor p.o. intake.  Continue with maintenance fluid at 125 cc/h.  Monitor BMP.

## 2024-08-01 NOTE — ASSESSMENT & PLAN NOTE
On home medication of lisinopril hydrochlorothiazide.  Blood pressure is well-controlled at this time.  Given elevated creatinine, hold off on lisinopril hydrochlorothiazide.  In the interim, start amlodipine 5 mg daily.  Monitor vitals, adjust dosing as needed.

## 2024-08-01 NOTE — ASSESSMENT & PLAN NOTE
Patient underwent cardiac catheterization on 7/26.  Today when the patient remove the bandage, he red, warm, tender lump in the cath site.  Has been having fever since last 5 days with Tmax of 103 °F as noted at home.  Cardiology made aware.  Will consider IR consult for hematoma aspiration for source control.

## 2024-08-01 NOTE — ASSESSMENT & PLAN NOTE
"Lab Results   Component Value Date    HGBA1C 8.0 (H) 07/26/2024       No results for input(s): \"POCGLU\" in the last 72 hours.    Blood Sugar Average: Last 72 hrs:    POA blood glucose 272.  Home medications include pioglitazone metformin combination.  Hold home medication during hospitalization in light of elevated creatinine.  Monitor blood glucose, insulin per sliding scale [facility]  "

## 2024-08-02 ENCOUNTER — HOSPITAL ENCOUNTER (INPATIENT)
Facility: HOSPITAL | Age: 69
LOS: 7 days | Discharge: HOME | DRG: 857 | End: 2024-08-09
Attending: SURGERY | Admitting: INTERNAL MEDICINE
Payer: COMMERCIAL

## 2024-08-02 ENCOUNTER — ANESTHESIA EVENT (OUTPATIENT)
Dept: PERIOP | Facility: HOSPITAL | Age: 69
DRG: 857 | End: 2024-08-02
Payer: COMMERCIAL

## 2024-08-02 ENCOUNTER — ANESTHESIA (OUTPATIENT)
Dept: PERIOP | Facility: HOSPITAL | Age: 69
DRG: 857 | End: 2024-08-02
Payer: COMMERCIAL

## 2024-08-02 VITALS
OXYGEN SATURATION: 91 % | DIASTOLIC BLOOD PRESSURE: 57 MMHG | SYSTOLIC BLOOD PRESSURE: 121 MMHG | HEART RATE: 81 BPM | RESPIRATION RATE: 18 BRPM | TEMPERATURE: 99.2 F | BODY MASS INDEX: 34.47 KG/M2 | HEIGHT: 72 IN

## 2024-08-02 DIAGNOSIS — L03.314 CELLULITIS OF GROIN, RIGHT: ICD-10-CM

## 2024-08-02 DIAGNOSIS — A41.01 STAPHYLOCOCCUS AUREUS BACTEREMIA WITH SEPSIS (HCC): Primary | ICD-10-CM

## 2024-08-02 DIAGNOSIS — T14.8XXA HEMATOMA: ICD-10-CM

## 2024-08-02 PROBLEM — A41.9 SEPSIS (HCC): Status: ACTIVE | Noted: 2024-08-02

## 2024-08-02 PROBLEM — E66.811 CLASS 1 OBESITY IN ADULT: Status: ACTIVE | Noted: 2024-07-26

## 2024-08-02 LAB
ALBUMIN SERPL BCG-MCNC: 3.3 G/DL (ref 3.5–5)
ALP SERPL-CCNC: 100 U/L (ref 34–104)
ALT SERPL W P-5'-P-CCNC: 22 U/L (ref 7–52)
ANION GAP SERPL CALCULATED.3IONS-SCNC: 9 MMOL/L (ref 4–13)
AST SERPL W P-5'-P-CCNC: 23 U/L (ref 13–39)
BASOPHILS # BLD AUTO: 0.03 THOUSANDS/ÂΜL (ref 0–0.1)
BASOPHILS NFR BLD AUTO: 0 % (ref 0–1)
BILIRUB SERPL-MCNC: 0.65 MG/DL (ref 0.2–1)
BUN SERPL-MCNC: 30 MG/DL (ref 5–25)
CALCIUM ALBUM COR SERPL-MCNC: 9.2 MG/DL (ref 8.3–10.1)
CALCIUM SERPL-MCNC: 8.6 MG/DL (ref 8.4–10.2)
CHLORIDE SERPL-SCNC: 103 MMOL/L (ref 96–108)
CO2 SERPL-SCNC: 22 MMOL/L (ref 21–32)
CREAT SERPL-MCNC: 1.11 MG/DL (ref 0.6–1.3)
EOSINOPHIL # BLD AUTO: 0.03 THOUSAND/ÂΜL (ref 0–0.61)
EOSINOPHIL NFR BLD AUTO: 0 % (ref 0–6)
ERYTHROCYTE [DISTWIDTH] IN BLOOD BY AUTOMATED COUNT: 14.6 % (ref 11.6–15.1)
GFR SERPL CREATININE-BSD FRML MDRD: 67 ML/MIN/1.73SQ M
GLUCOSE SERPL-MCNC: 145 MG/DL (ref 65–140)
GLUCOSE SERPL-MCNC: 162 MG/DL (ref 65–140)
GLUCOSE SERPL-MCNC: 171 MG/DL (ref 65–140)
GLUCOSE SERPL-MCNC: 182 MG/DL (ref 65–140)
GLUCOSE SERPL-MCNC: 192 MG/DL (ref 65–140)
HCT VFR BLD AUTO: 32.2 % (ref 36.5–49.3)
HGB BLD-MCNC: 10.7 G/DL (ref 12–17)
IMM GRANULOCYTES # BLD AUTO: 0.1 THOUSAND/UL (ref 0–0.2)
IMM GRANULOCYTES NFR BLD AUTO: 1 % (ref 0–2)
LYMPHOCYTES # BLD AUTO: 0.5 THOUSANDS/ÂΜL (ref 0.6–4.47)
LYMPHOCYTES NFR BLD AUTO: 4 % (ref 14–44)
MAGNESIUM SERPL-MCNC: 1.4 MG/DL (ref 1.9–2.7)
MCH RBC QN AUTO: 30.4 PG (ref 26.8–34.3)
MCHC RBC AUTO-ENTMCNC: 33.2 G/DL (ref 31.4–37.4)
MCV RBC AUTO: 92 FL (ref 82–98)
MONOCYTES # BLD AUTO: 1.2 THOUSAND/ÂΜL (ref 0.17–1.22)
MONOCYTES NFR BLD AUTO: 9 % (ref 4–12)
NEUTROPHILS # BLD AUTO: 11.52 THOUSANDS/ÂΜL (ref 1.85–7.62)
NEUTS SEG NFR BLD AUTO: 86 % (ref 43–75)
NRBC BLD AUTO-RTO: 0 /100 WBCS
PLATELET # BLD AUTO: 310 THOUSANDS/UL (ref 149–390)
PMV BLD AUTO: 10.1 FL (ref 8.9–12.7)
POTASSIUM SERPL-SCNC: 3.9 MMOL/L (ref 3.5–5.3)
PROT SERPL-MCNC: 6.2 G/DL (ref 6.4–8.4)
RBC # BLD AUTO: 3.52 MILLION/UL (ref 3.88–5.62)
SODIUM SERPL-SCNC: 134 MMOL/L (ref 135–147)
VANCOMYCIN SERPL-MCNC: 18.7 UG/ML (ref 10–20)
WBC # BLD AUTO: 13.38 THOUSAND/UL (ref 4.31–10.16)

## 2024-08-02 PROCEDURE — 99239 HOSP IP/OBS DSCHRG MGMT >30: CPT | Performed by: INTERNAL MEDICINE

## 2024-08-02 PROCEDURE — 87205 SMEAR GRAM STAIN: CPT | Performed by: SURGERY

## 2024-08-02 PROCEDURE — 87147 CULTURE TYPE IMMUNOLOGIC: CPT | Performed by: SURGERY

## 2024-08-02 PROCEDURE — 87040 BLOOD CULTURE FOR BACTERIA: CPT | Performed by: INTERNAL MEDICINE

## 2024-08-02 PROCEDURE — 80202 ASSAY OF VANCOMYCIN: CPT | Performed by: INTERNAL MEDICINE

## 2024-08-02 PROCEDURE — 85025 COMPLETE CBC W/AUTO DIFF WBC: CPT

## 2024-08-02 PROCEDURE — 82948 REAGENT STRIP/BLOOD GLUCOSE: CPT

## 2024-08-02 PROCEDURE — 99223 1ST HOSP IP/OBS HIGH 75: CPT | Performed by: INTERNAL MEDICINE

## 2024-08-02 PROCEDURE — 80053 COMPREHEN METABOLIC PANEL: CPT

## 2024-08-02 PROCEDURE — 87070 CULTURE OTHR SPECIMN AEROBIC: CPT | Performed by: SURGERY

## 2024-08-02 PROCEDURE — 99233 SBSQ HOSP IP/OBS HIGH 50: CPT | Performed by: PHYSICIAN ASSISTANT

## 2024-08-02 PROCEDURE — NC001 PR NO CHARGE: Performed by: SURGERY

## 2024-08-02 PROCEDURE — 87075 CULTR BACTERIA EXCEPT BLOOD: CPT | Performed by: SURGERY

## 2024-08-02 PROCEDURE — 0J9C0ZZ DRAINAGE OF PELVIC REGION SUBCUTANEOUS TISSUE AND FASCIA, OPEN APPROACH: ICD-10-PCS | Performed by: SURGERY

## 2024-08-02 PROCEDURE — 87186 SC STD MICRODIL/AGAR DIL: CPT | Performed by: SURGERY

## 2024-08-02 PROCEDURE — 10140 I&D HMTMA SEROMA/FLUID COLLJ: CPT | Performed by: SURGERY

## 2024-08-02 PROCEDURE — 83735 ASSAY OF MAGNESIUM: CPT

## 2024-08-02 RX ORDER — AMLODIPINE BESYLATE 5 MG/1
5 TABLET ORAL DAILY
Status: DISCONTINUED | OUTPATIENT
Start: 2024-08-03 | End: 2024-08-09 | Stop reason: HOSPADM

## 2024-08-02 RX ORDER — HYDROMORPHONE HCL/PF 1 MG/ML
0.5 SYRINGE (ML) INJECTION
Status: DISCONTINUED | OUTPATIENT
Start: 2024-08-02 | End: 2024-08-02 | Stop reason: HOSPADM

## 2024-08-02 RX ORDER — INSULIN LISPRO 100 [IU]/ML
2-12 INJECTION, SOLUTION INTRAVENOUS; SUBCUTANEOUS
Status: CANCELLED | OUTPATIENT
Start: 2024-08-02

## 2024-08-02 RX ORDER — EZETIMIBE 10 MG/1
10 TABLET ORAL DAILY
Status: DISCONTINUED | OUTPATIENT
Start: 2024-08-03 | End: 2024-08-09 | Stop reason: HOSPADM

## 2024-08-02 RX ORDER — ATORVASTATIN CALCIUM 40 MG/1
40 TABLET, FILM COATED ORAL
Status: CANCELLED | OUTPATIENT
Start: 2024-08-02

## 2024-08-02 RX ORDER — SODIUM CHLORIDE, SODIUM LACTATE, POTASSIUM CHLORIDE, CALCIUM CHLORIDE 600; 310; 30; 20 MG/100ML; MG/100ML; MG/100ML; MG/100ML
100 INJECTION, SOLUTION INTRAVENOUS CONTINUOUS
Status: DISCONTINUED | OUTPATIENT
Start: 2024-08-02 | End: 2024-08-03

## 2024-08-02 RX ORDER — INSULIN LISPRO 100 [IU]/ML
2-12 INJECTION, SOLUTION INTRAVENOUS; SUBCUTANEOUS
Status: DISCONTINUED | OUTPATIENT
Start: 2024-08-02 | End: 2024-08-09 | Stop reason: HOSPADM

## 2024-08-02 RX ORDER — HYDROMORPHONE HCL/PF 1 MG/ML
SYRINGE (ML) INJECTION AS NEEDED
Status: DISCONTINUED | OUTPATIENT
Start: 2024-08-02 | End: 2024-08-02

## 2024-08-02 RX ORDER — VANCOMYCIN HYDROCHLORIDE 1 G/200ML
1000 INJECTION, SOLUTION INTRAVENOUS EVERY 12 HOURS
Status: DISCONTINUED | OUTPATIENT
Start: 2024-08-02 | End: 2024-08-02 | Stop reason: HOSPADM

## 2024-08-02 RX ORDER — SODIUM CHLORIDE, SODIUM LACTATE, POTASSIUM CHLORIDE, CALCIUM CHLORIDE 600; 310; 30; 20 MG/100ML; MG/100ML; MG/100ML; MG/100ML
125 INJECTION, SOLUTION INTRAVENOUS CONTINUOUS
Status: CANCELLED | OUTPATIENT
Start: 2024-08-02

## 2024-08-02 RX ORDER — VANCOMYCIN HYDROCHLORIDE 1 G/200ML
1000 INJECTION, SOLUTION INTRAVENOUS EVERY 12 HOURS
Status: CANCELLED | OUTPATIENT
Start: 2024-08-03

## 2024-08-02 RX ORDER — FENTANYL CITRATE/PF 50 MCG/ML
25 SYRINGE (ML) INJECTION
Status: DISCONTINUED | OUTPATIENT
Start: 2024-08-02 | End: 2024-08-02 | Stop reason: HOSPADM

## 2024-08-02 RX ORDER — ONDANSETRON 2 MG/ML
4 INJECTION INTRAMUSCULAR; INTRAVENOUS ONCE AS NEEDED
Status: DISCONTINUED | OUTPATIENT
Start: 2024-08-02 | End: 2024-08-02 | Stop reason: HOSPADM

## 2024-08-02 RX ORDER — PROPOFOL 10 MG/ML
INJECTION, EMULSION INTRAVENOUS AS NEEDED
Status: DISCONTINUED | OUTPATIENT
Start: 2024-08-02 | End: 2024-08-02

## 2024-08-02 RX ORDER — MIDAZOLAM HYDROCHLORIDE 2 MG/2ML
INJECTION, SOLUTION INTRAMUSCULAR; INTRAVENOUS AS NEEDED
Status: DISCONTINUED | OUTPATIENT
Start: 2024-08-02 | End: 2024-08-02

## 2024-08-02 RX ORDER — MAGNESIUM SULFATE HEPTAHYDRATE 40 MG/ML
2 INJECTION, SOLUTION INTRAVENOUS ONCE
Status: DISCONTINUED | OUTPATIENT
Start: 2024-08-02 | End: 2024-08-02 | Stop reason: HOSPADM

## 2024-08-02 RX ORDER — ONDANSETRON 2 MG/ML
INJECTION INTRAMUSCULAR; INTRAVENOUS AS NEEDED
Status: DISCONTINUED | OUTPATIENT
Start: 2024-08-02 | End: 2024-08-02

## 2024-08-02 RX ORDER — SODIUM CHLORIDE, SODIUM LACTATE, POTASSIUM CHLORIDE, CALCIUM CHLORIDE 600; 310; 30; 20 MG/100ML; MG/100ML; MG/100ML; MG/100ML
INJECTION, SOLUTION INTRAVENOUS CONTINUOUS PRN
Status: DISCONTINUED | OUTPATIENT
Start: 2024-08-02 | End: 2024-08-02

## 2024-08-02 RX ORDER — ATORVASTATIN CALCIUM 40 MG/1
40 TABLET, FILM COATED ORAL
Status: DISCONTINUED | OUTPATIENT
Start: 2024-08-02 | End: 2024-08-09 | Stop reason: HOSPADM

## 2024-08-02 RX ORDER — VANCOMYCIN HYDROCHLORIDE 1 G/200ML
1000 INJECTION, SOLUTION INTRAVENOUS EVERY 12 HOURS
Status: DISCONTINUED | OUTPATIENT
Start: 2024-08-03 | End: 2024-08-03 | Stop reason: DRUGHIGH

## 2024-08-02 RX ORDER — EZETIMIBE 10 MG/1
10 TABLET ORAL DAILY
Status: CANCELLED | OUTPATIENT
Start: 2024-08-03

## 2024-08-02 RX ORDER — CEFAZOLIN SODIUM 1 G/3ML
INJECTION, POWDER, FOR SOLUTION INTRAMUSCULAR; INTRAVENOUS AS NEEDED
Status: DISCONTINUED | OUTPATIENT
Start: 2024-08-02 | End: 2024-08-02

## 2024-08-02 RX ORDER — AMLODIPINE BESYLATE 2.5 MG/1
5 TABLET ORAL DAILY
Status: CANCELLED | OUTPATIENT
Start: 2024-08-03

## 2024-08-02 RX ORDER — ACETAMINOPHEN 10 MG/ML
1000 INJECTION, SOLUTION INTRAVENOUS EVERY 6 HOURS PRN
Status: CANCELLED | OUTPATIENT
Start: 2024-08-02

## 2024-08-02 RX ORDER — FENTANYL CITRATE 50 UG/ML
INJECTION, SOLUTION INTRAMUSCULAR; INTRAVENOUS AS NEEDED
Status: DISCONTINUED | OUTPATIENT
Start: 2024-08-02 | End: 2024-08-02

## 2024-08-02 RX ORDER — ENOXAPARIN SODIUM 100 MG/ML
40 INJECTION SUBCUTANEOUS DAILY
Status: DISCONTINUED | OUTPATIENT
Start: 2024-08-03 | End: 2024-08-09 | Stop reason: HOSPADM

## 2024-08-02 RX ORDER — BUPIVACAINE HYDROCHLORIDE 2.5 MG/ML
INJECTION, SOLUTION EPIDURAL; INFILTRATION; INTRACAUDAL AS NEEDED
Status: DISCONTINUED | OUTPATIENT
Start: 2024-08-02 | End: 2024-08-02 | Stop reason: HOSPADM

## 2024-08-02 RX ORDER — ACETAMINOPHEN 10 MG/ML
1000 INJECTION, SOLUTION INTRAVENOUS EVERY 6 HOURS PRN
Status: DISCONTINUED | OUTPATIENT
Start: 2024-08-02 | End: 2024-08-05

## 2024-08-02 RX ORDER — LIDOCAINE HYDROCHLORIDE 20 MG/ML
INJECTION, SOLUTION EPIDURAL; INFILTRATION; INTRACAUDAL; PERINEURAL AS NEEDED
Status: DISCONTINUED | OUTPATIENT
Start: 2024-08-02 | End: 2024-08-02

## 2024-08-02 RX ADMIN — INSULIN LISPRO 2 UNITS: 100 INJECTION, SOLUTION INTRAVENOUS; SUBCUTANEOUS at 11:20

## 2024-08-02 RX ADMIN — LIDOCAINE HYDROCHLORIDE 100 MG: 20 INJECTION, SOLUTION EPIDURAL; INFILTRATION; INTRACAUDAL; PERINEURAL at 16:18

## 2024-08-02 RX ADMIN — FENTANYL CITRATE 25 MCG: 50 INJECTION INTRAMUSCULAR; INTRAVENOUS at 16:33

## 2024-08-02 RX ADMIN — INSULIN LISPRO 2 UNITS: 100 INJECTION, SOLUTION INTRAVENOUS; SUBCUTANEOUS at 07:30

## 2024-08-02 RX ADMIN — ATORVASTATIN CALCIUM 40 MG: 40 TABLET, FILM COATED ORAL at 18:58

## 2024-08-02 RX ADMIN — CEFAZOLIN 2000 MG: 1 INJECTION, POWDER, FOR SOLUTION INTRAMUSCULAR; INTRAVENOUS at 16:24

## 2024-08-02 RX ADMIN — FENTANYL CITRATE 50 MCG: 50 INJECTION INTRAMUSCULAR; INTRAVENOUS at 16:25

## 2024-08-02 RX ADMIN — EZETIMIBE 10 MG: 10 TABLET ORAL at 08:49

## 2024-08-02 RX ADMIN — INSULIN LISPRO 2 UNITS: 100 INJECTION, SOLUTION INTRAVENOUS; SUBCUTANEOUS at 21:37

## 2024-08-02 RX ADMIN — PROPOFOL 200 MG: 10 INJECTION, EMULSION INTRAVENOUS at 16:18

## 2024-08-02 RX ADMIN — FENTANYL CITRATE 25 MCG: 50 INJECTION INTRAMUSCULAR; INTRAVENOUS at 16:18

## 2024-08-02 RX ADMIN — HYDROMORPHONE HYDROCHLORIDE 0.5 MG: 1 INJECTION, SOLUTION INTRAMUSCULAR; INTRAVENOUS; SUBCUTANEOUS at 16:35

## 2024-08-02 RX ADMIN — SODIUM CHLORIDE, SODIUM LACTATE, POTASSIUM CHLORIDE, AND CALCIUM CHLORIDE 125 ML/HR: .6; .31; .03; .02 INJECTION, SOLUTION INTRAVENOUS at 01:53

## 2024-08-02 RX ADMIN — ONDANSETRON 4 MG: 2 INJECTION INTRAMUSCULAR; INTRAVENOUS at 16:18

## 2024-08-02 RX ADMIN — SODIUM CHLORIDE, SODIUM LACTATE, POTASSIUM CHLORIDE, AND CALCIUM CHLORIDE 125 ML/HR: .6; .31; .03; .02 INJECTION, SOLUTION INTRAVENOUS at 10:34

## 2024-08-02 RX ADMIN — CEFEPIME 2000 MG: 2 INJECTION, POWDER, FOR SOLUTION INTRAVENOUS at 01:53

## 2024-08-02 RX ADMIN — PROPOFOL 30 MG: 10 INJECTION, EMULSION INTRAVENOUS at 16:36

## 2024-08-02 RX ADMIN — ASPIRIN 81 MG: 81 TABLET, COATED ORAL at 08:48

## 2024-08-02 RX ADMIN — MIDAZOLAM 2 MG: 1 INJECTION INTRAMUSCULAR; INTRAVENOUS at 16:11

## 2024-08-02 RX ADMIN — VANCOMYCIN HYDROCHLORIDE 1000 MG: 1 INJECTION, SOLUTION INTRAVENOUS at 23:16

## 2024-08-02 RX ADMIN — AMLODIPINE BESYLATE 5 MG: 2.5 TABLET ORAL at 08:48

## 2024-08-02 RX ADMIN — SODIUM CHLORIDE, SODIUM LACTATE, POTASSIUM CHLORIDE, AND CALCIUM CHLORIDE: .6; .31; .03; .02 INJECTION, SOLUTION INTRAVENOUS at 16:11

## 2024-08-02 RX ADMIN — CEFEPIME 2000 MG: 2 INJECTION, POWDER, FOR SOLUTION INTRAVENOUS at 10:34

## 2024-08-02 RX ADMIN — PROPOFOL 50 MG: 10 INJECTION, EMULSION INTRAVENOUS at 16:33

## 2024-08-02 RX ADMIN — VANCOMYCIN HYDROCHLORIDE 1000 MG: 1 INJECTION, SOLUTION INTRAVENOUS at 11:20

## 2024-08-02 RX ADMIN — CEFEPIME 2000 MG: 2 INJECTION, POWDER, FOR SOLUTION INTRAVENOUS at 19:57

## 2024-08-02 NOTE — ASSESSMENT & PLAN NOTE
Recent Labs     08/01/24  1026 08/02/24  0452   CREATININE 1.32* 1.11   EGFR 55 67       Estimated Creatinine Clearance: 83.4 mL/min (by C-G formula based on SCr of 1.11 mg/dL).     Patient with baseline creatinine ranging between 1.9 and 1.2.  POA creatinine of 1.32.   Possibly in the setting of poor p.o. intake.  Continue with maintenance fluid at 125 cc/h.  Monitor BMP.

## 2024-08-02 NOTE — HOSPITAL COURSE
Patient status post cardiac cath (6 days ago), for chest pain and upward trending troponin, presented with right groin swelling, pain, anorexia, fevers and headaches. Right groin tender to palpation, blanching erythema with induration, no active drainage or fluctuance. Palp fem, DP & PT all 2+. R thigh and leg compartments are soft, denies numbness or tingling. Patient was seen and examined at bedside, patient in no signs of acute distress patient denies any overnight complaints or concerns. denies any increase in swelling or changes.  Denies any chest pains, palpitation, shortness of breath, leg pain or numbness and tingling this sensation.  Patient seen by both vascular surgery and cardiology, vascular surgery recommend immediate transfer to [facility] for further evaluation and drainage .

## 2024-08-02 NOTE — CONSULTS
Cardiology   Juan Carlos Cleveland Jr. 68 y.o. male MRN: [id number]  Unit/Bed#: -01 Encounter: [id number]      Reason for Consult / Principal Problem:Infected right groin and hematoma    Physician Requesting Consult:  Rupinder Fritz MD    Cardiologist: Dr. Noriega    PCP:Todd Nolasco DO      Assessment/Plan:    Right groin infection and hematoma S/P cardiac cath with Angioseal closure of right femoral artery 7/26/24   Chest pain -admitted on 7/26/2024 -underwent cardiac catheterization with no significant coronary artery disease identified  Hypertension controlled with lisinopril and HCTZ 20-25 daily  Hyperlipidemia atorvastatin 20 mg daily and Tricor 145 mg daily  History of DVT in 2020-no longer on anticoagulation.  Morbid obesity BMI 35.36    HPI: Juan Carlos Cleveland Jr. 68 y.o. year old male who was admitted 7/26/24  for chest pain which he described as tightness between his shoulder blades since spread to his chest with associated diaphoresis and dizziness.  His troponins were 0 hr trop 83, 2 hr trop 234.  No significant EKG changes.    Underwent a cardiac catheterization on  7/26/2024  Dr. Noriega.  There were no significant epicardial coronary artery disease.  His right femoral artery was closed with Angio-Seal.  Patient was discharged home on the following day.     Patient presented to the ED on 8/1/24 with a 5-day history of low-grade fever.  Yesterday he noted a large area of erythema and a painful lump and erythema in the right groin.  Patient was admitted to the hospital and vascular ultrasound ruled out pseudoaneurysm.  His Tmax was 103 yesterday. He was started on vancomycin and cefepime. VAS duplex shows widely patent common femoral and external iliac arteries with no evidence of active pseudoaneurysm. There is also non-vascular structure at the groin measuring approximately 5.0 cm suggestive of hematoma. Concerns for infected groin hematoma.     Patient had no complaints of chest pain, shortness of  breath, vomiting, or nausea.    Patient was seen by vascular surgery.  I personally spoke with , Vascular surgeon regarding this patient.  She recommends that the patient be transferred to [facility] for surgical exploration of the right groin she would be done as soon as possible not wait till Monday.  Clear surgery does not have the OR time here at [facility].  This was discussed with [facility] attending Dr. Fritz.    Family History: History reviewed. No pertinent family history.  Historical Information   Past Medical History:   Diagnosis Date    Diabetes mellitus (HCC)     boaderline    DVT (deep venous thrombosis) (HCC)     History of blood clots right leg      june 23 2020    Hypertension     PONV (postoperative nausea and vomiting)     nausea after shoulder surgery     Venous stasis dermatitis of right lower extremity 06/24/2020     Past Surgical History:   Procedure Laterality Date    APPENDECTOMY      BACK SURGERY      CARDIAC CATHETERIZATION N/A 7/26/2024    Procedure: Cardiac Coronary Angiogram;  Surgeon: Raymond Noriega MD;  [facility];  Service: Cardiology    COLONOSCOPY  11/19/2016    ESOPHAGOGASTRODUODENOSCOPY      ESOPHAGOGASTRODUODENOSCOPY N/A 10/26/2016    Procedure: ESOPHAGOGASTRODUODENOSCOPY (EGD);  Surgeon: Tito Rutherford MD;  [facility];  Service:     AZ COLONOSCOPY FLX DX W/COLLJ SPEC WHEN PFRMD N/A 11/19/2016    Procedure: COLONOSCOPY;  Surgeon: Sandor Hay MD;  [facility];  Service: Gastroenterology    AZ EGD BALLOON DILATION ESOPHAGUS <30 MM DIAM N/A 10/26/2016    Procedure: BALLOON DILATION ;  Surgeon: Tito Rutherford MD;  [facility];  Service: Gastroenterology    AZ SURGICAL ARTHROSCOPY SHOULDER W/ROTATOR CUFF RPR Left 04/21/2016    Procedure: LEFT SHOULDER ARTHROSCOPIC ROTATOR CUFF REPAIR, SUBACROMIAL DECOMPRESSION, BICEPS TENODESIS;  Surgeon: Amado Avelar MD;  [facility];  Service: Orthopedics    AZ  TENDON SHEATH INCISION Right 11/15/2016    Procedure: INDEX TRIGGER FINGER RELEASE ;  Surgeon: Todd Silva MD;  [facility];  Service: Orthopedics     Social History   Social History     Substance and Sexual Activity   Alcohol Use No     Social History     Substance and Sexual Activity   Drug Use No     Social History     Tobacco Use   Smoking Status Never   Smokeless Tobacco Never     Family History: History reviewed. No pertinent family history.    Review of Systems:  Review of Systems   Constitutional:  Positive for fever.   HENT: Negative.     Respiratory: Negative.     Cardiovascular: Negative.    Genitourinary: Negative.    Musculoskeletal: Negative.    Skin:         Groin has hard firm area medial and large area of erythema rounding right femoral access site   Hematological: Negative.    Psychiatric/Behavioral: Negative.             Scheduled Meds:  Current Facility-Administered Medications   Medication Dose Route Frequency Provider Last Rate    acetaminophen  1,000 mg Intravenous Q6H PRN David Lane MD Stopped (08/01/24 2252)    amLODIPine  5 mg Oral Daily Mihaela Gardner MD      aspirin  81 mg Oral Daily David Lane MD      atorvastatin  40 mg Oral Daily With Dinner David Lane MD      cefepime  2,000 mg Intravenous Q8H David Lane MD 2,000 mg (08/02/24 1034)    ezetimibe  10 mg Oral Daily David Lane MD      insulin lispro  2-12 Units Subcutaneous 4x Daily (AC & HS) David Lane MD      lactated ringers  125 mL/hr Intravenous Continuous David Lane  mL/hr (08/02/24 1034)    vancomycin  1,000 mg Intravenous Q12H Sandy Gonzalez MD       Continuous Infusions:lactated ringers, 125 mL/hr, Last Rate: 125 mL/hr (08/02/24 1034)      PRN Meds:.  acetaminophen  PTA meds:   Prior to Admission Medications   Prescriptions Last Dose Informant Patient Reported? Taking?   Multiple Vitamins-Minerals (CENTRUM SILVER PO) 7/31/2024 Self Yes Yes   Sig: Take 1 tablet by mouth  daily.   Tricor 145 MG tablet Not Taking Self Yes No   Sig: Take 145 mg by mouth daily   Patient not taking: Reported on 8/1/2024   aspirin (ECOTRIN LOW STRENGTH) 81 mg EC tablet 8/1/2024  No Yes   Sig: Take 1 tablet (81 mg total) by mouth daily   bisacodyl (DULCOLAX) 5 mg EC tablet   No No   Sig: Take 2 tablets (10 mg total) by mouth once for 1 dose   ezetimibe (ZETIA) 10 mg tablet 7/31/2024  Yes Yes   Sig: Take 10 mg by mouth daily   lisinopril-hydrochlorothiazide (PRINZIDE,ZESTORETIC) 20-25 MG per tablet 8/1/2024 Self Yes Yes   Sig: Take 1 tablet by mouth daily.   pioglitazone-metFORMIN (ACTOPLUS MET)  MG per tablet 8/1/2024  No Yes   Sig: Take 1 tablet by mouth 2 (two) times a day with meals   rosuvastatin (CRESTOR) 20 MG tablet 7/31/2024 Self Yes Yes   tadalafil (CIALIS) 5 MG tablet 7/31/2024 Self Yes Yes   Sig: Take 5 mg by mouth daily as needed for erectile dysfunction.      Facility-Administered Medications: None       Allergies   Allergen Reactions    Tetanus Toxoid Abdominal Pain    Tetanus Toxoids     Tetanus-Diphtheria Toxoids Td Swelling    Fenofibrate Hives, Abdominal Pain and Rash     Generic        Objective   Vitals: Blood pressure 121/57, pulse 81, temperature 99.8 °F (37.7 °C), temperature source Oral, resp. rate 18, height 6' (1.829 m), SpO2 91%., Body mass index is 34.47 kg/m².,   Orthostatic Blood Pressures      Flowsheet Row Most Recent Value   Blood Pressure 121/57 filed at 08/02/2024 0632   Patient Position - Orthostatic VS Lying filed at 08/02/2024 0632              Intake/Output Summary (Last 24 hours) at 8/2/2024 1122  Last data filed at 8/2/2024 1034  Gross per 24 hour   Intake 2330 ml   Output 450 ml   Net 1880 ml       Invasive Devices       Peripheral Intravenous Line  Duration             Peripheral IV 08/01/24 Distal;Right;Upper;Ventral (anterior) Arm 1 day    Peripheral IV 08/01/24 Right;Ventral (anterior) Wrist 1 day                      Physical Exam  Vitals reviewed.    Constitutional:       Appearance: Normal appearance.   HENT:      Head: Normocephalic and atraumatic.   Cardiovascular:      Rate and Rhythm: Normal rate and regular rhythm.      Heart sounds: Murmur heard.      Comments: Soft systolic murmur  Pulmonary:      Effort: Pulmonary effort is normal.      Breath sounds: Normal breath sounds.   Abdominal:      General: Bowel sounds are normal.      Palpations: Abdomen is soft.   Skin:     General: Skin is warm.      Capillary Refill: Capillary refill takes 2 to 3 seconds.      Comments: Right groin has larger erythema and large firm hematoma   Neurological:      Mental Status: He is alert and oriented to person, place, and time.         Lab Results:   Recent Results (from the past 24 hour(s))   Platelet count    Collection Time: 08/01/24  3:39 PM   Result Value Ref Range    Platelets 251 149 - 390 Thousands/uL    MPV 10.1 8.9 - 12.7 fL   COVID/FLU/RSV    Collection Time: 08/01/24  3:39 PM    Specimen: Nose; Nares   Result Value Ref Range    SARS-CoV-2 Negative Negative    INFLUENZA A PCR Negative Negative    INFLUENZA B PCR Negative Negative    RSV PCR Negative Negative   Fingerstick Glucose (POCT)    Collection Time: 08/01/24  5:13 PM   Result Value Ref Range    POC Glucose 193 (H) 65 - 140 mg/dl   Fingerstick Glucose (POCT)    Collection Time: 08/01/24  9:09 PM   Result Value Ref Range    POC Glucose 184 (H) 65 - 140 mg/dl   Fingerstick Glucose (POCT)    Collection Time: 08/01/24 10:43 PM   Result Value Ref Range    POC Glucose 174 (H) 65 - 140 mg/dl   Comprehensive metabolic panel    Collection Time: 08/02/24  4:52 AM   Result Value Ref Range    Sodium 134 (L) 135 - 147 mmol/L    Potassium 3.9 3.5 - 5.3 mmol/L    Chloride 103 96 - 108 mmol/L    CO2 22 21 - 32 mmol/L    ANION GAP 9 4 - 13 mmol/L    BUN 30 (H) 5 - 25 mg/dL    Creatinine 1.11 0.60 - 1.30 mg/dL    Glucose 162 (H) 65 - 140 mg/dL    Calcium 8.6 8.4 - 10.2 mg/dL    Corrected Calcium 9.2 8.3 - 10.1 mg/dL     AST 23 13 - 39 U/L    ALT 22 7 - 52 U/L    Alkaline Phosphatase 100 34 - 104 U/L    Total Protein 6.2 (L) 6.4 - 8.4 g/dL    Albumin 3.3 (L) 3.5 - 5.0 g/dL    Total Bilirubin 0.65 0.20 - 1.00 mg/dL    eGFR 67 ml/min/1.73sq m   Magnesium    Collection Time: 08/02/24  4:52 AM   Result Value Ref Range    Magnesium 1.4 (L) 1.9 - 2.7 mg/dL   CBC and differential    Collection Time: 08/02/24  4:52 AM   Result Value Ref Range    WBC 13.38 (H) 4.31 - 10.16 Thousand/uL    RBC 3.52 (L) 3.88 - 5.62 Million/uL    Hemoglobin 10.7 (L) 12.0 - 17.0 g/dL    Hematocrit 32.2 (L) 36.5 - 49.3 %    MCV 92 82 - 98 fL    MCH 30.4 26.8 - 34.3 pg    MCHC 33.2 31.4 - 37.4 g/dL    RDW 14.6 11.6 - 15.1 %    MPV 10.1 8.9 - 12.7 fL    Platelets 310 149 - 390 Thousands/uL    nRBC 0 /100 WBCs    Segmented % 86 (H) 43 - 75 %    Immature Grans % 1 0 - 2 %    Lymphocytes % 4 (L) 14 - 44 %    Monocytes % 9 4 - 12 %    Eosinophils Relative 0 0 - 6 %    Basophils Relative 0 0 - 1 %    Absolute Neutrophils 11.52 (H) 1.85 - 7.62 Thousands/µL    Absolute Immature Grans 0.10 0.00 - 0.20 Thousand/uL    Absolute Lymphocytes 0.50 (L) 0.60 - 4.47 Thousands/µL    Absolute Monocytes 1.20 0.17 - 1.22 Thousand/µL    Eosinophils Absolute 0.03 0.00 - 0.61 Thousand/µL    Basophils Absolute 0.03 0.00 - 0.10 Thousands/µL   Vancomycin, random    Collection Time: 08/02/24  4:52 AM   Result Value Ref Range    Vancomycin Rm 18.7 10.0 - 20.0 ug/mL   Fingerstick Glucose (POCT)    Collection Time: 08/02/24  7:10 AM   Result Value Ref Range    POC Glucose 171 (H) 65 - 140 mg/dl   Fingerstick Glucose (POCT)    Collection Time: 08/02/24 11:05 AM   Result Value Ref Range    POC Glucose 182 (H) 65 - 140 mg/dl   ]    Imaging: I have personally reviewed pertinent reports.        CHEST X-RAY: No active cardiopulmonary disease  ECHO: Ejection fraction 65%    Code Status: Level 1 full code  Epic/ Allscripts/Care Everywhere records reviewed:     * Please Note: Fluency DirectDictation voice  to text software may have been used in the creation of this document. **

## 2024-08-02 NOTE — PLAN OF CARE
Problem: PAIN - ADULT  Goal: Verbalizes/displays adequate comfort level or baseline comfort level  Description: Interventions:  - Encourage patient to monitor pain and request assistance  - Assess pain using appropriate pain scale  - Administer analgesics based on type and severity of pain and evaluate response  - Implement non-pharmacological measures as appropriate and evaluate response  - Consider cultural and social influences on pain and pain management  - Notify physician/advanced practitioner if interventions unsuccessful or patient reports new pain  Outcome: Progressing     Problem: SAFETY ADULT  Goal: Patient will remain free of falls  Description: INTERVENTIONS:  - Educate patient/family on patient safety including physical limitations  - Instruct patient to call for assistance with activity   - Consult OT/PT to assist with strengthening/mobility   - Keep Call bell within reach  - Keep bed low and locked with side rails adjusted as appropriate  - Keep care items and personal belongings within reach  - Initiate and maintain comfort rounds  - Make Fall Risk Sign visible to staff  - Apply yellow socks and bracelet for high fall risk patients  - Consider moving patient to room near nurses station  Outcome: Progressing     Problem: SAFETY ADULT  Goal: Maintain or return to baseline ADL function  Description: INTERVENTIONS:  -  Assess patient's ability to carry out ADLs; assess patient's baseline for ADL function and identify physical deficits which impact ability to perform ADLs (bathing, care of mouth/teeth, toileting, grooming, dressing, etc.)  - Assess/evaluate cause of self-care deficits   - Assess range of motion  - Assess patient's mobility; develop plan if impaired  - Assess patient's need for assistive devices and provide as appropriate  - Encourage maximum independence but intervene and supervise when necessary  - Involve family in performance of ADLs  - Assess for home care needs following discharge    - Consider OT consult to assist with ADL evaluation and planning for discharge  - Provide patient education as appropriate  Outcome: Progressing

## 2024-08-02 NOTE — CONSULTS
Consultation - Vascular Surgery  Juan Carlos Cleveland Jr. 68 y.o. male MRN: [id number]  Unit/Bed#: OR POOL Encounter: [id number]        Assessment & Plan   68 y.o. male with Hx of HTN, DM, DVT. 5-days of fever, chills, night sweats and 1 day of red, warm, tender lump in right groin at vascular access site. Cardiac cath done on 7/26. Febrile to 103.1F @ 1410, currently afebrile and rest of VS stable on RA. Neurovascularly intact; concern for infected hematoma at access site.      Plan:  -Plan for urgent OR for surgical drainage and possible vac  -Continue IV abx  -Admit to medical service post-op  -DVT ppx/anticoagulation per primary team      History of Present Illness     HPI:  Juan Carlos Cleveland Jr. is a 68 y.o. male who presents with infected groin hematoma s/p cardiac cath. 5-days of fever, chills, night sweats and 1 day of red, warm, tender lump in right groin at vascular access site. Cardiac cath done on 7/26.  Patient reports fevers were intermittent and and believed he may have had COVID which she tested negative for. Patient denies numbness and tingling in his right lower extremity or thigh.  Patient denies nausea vomiting chest pain or shortness of breath. Patient reports normal bowel function. Hx of HTN, DM, DVT.    Review of Systems   Constitutional:  Positive for chills and fever.   Respiratory:  Negative for shortness of breath.    Cardiovascular:  Negative for chest pain.   Gastrointestinal:  Negative for diarrhea, nausea and vomiting.   As stated in above HPI; otherwise negative    Historical Information   Past Medical History:   Diagnosis Date    Diabetes mellitus (HCC)     boaderline    DVT (deep venous thrombosis) (HCC)     History of blood clots right leg      june 23 2020    Hypertension     PONV (postoperative nausea and vomiting)     nausea after shoulder surgery     Venous stasis dermatitis of right lower extremity 06/24/2020     Past Surgical History:   Procedure Laterality Date    APPENDECTOMY       BACK SURGERY      CARDIAC CATHETERIZATION N/A 7/26/2024    Procedure: Cardiac Coronary Angiogram;  Surgeon: Raymond Noriega MD;  [facility];  Service: Cardiology    COLONOSCOPY  11/19/2016    ESOPHAGOGASTRODUODENOSCOPY      ESOPHAGOGASTRODUODENOSCOPY N/A 10/26/2016    Procedure: ESOPHAGOGASTRODUODENOSCOPY (EGD);  Surgeon: Tito Rutherford MD;  [facility];  Service:     AL COLONOSCOPY FLX DX W/COLLJ SPEC WHEN PFRMD N/A 11/19/2016    Procedure: COLONOSCOPY;  Surgeon: Sandor Hay MD;  [facility];  Service: Gastroenterology    AL EGD BALLOON DILATION ESOPHAGUS <30 MM DIAM N/A 10/26/2016    Procedure: BALLOON DILATION ;  Surgeon: Tito Rutherford MD;  [facility];  Service: Gastroenterology    AL SURGICAL ARTHROSCOPY SHOULDER W/ROTATOR CUFF RPR Left 04/21/2016    Procedure: LEFT SHOULDER ARTHROSCOPIC ROTATOR CUFF REPAIR, SUBACROMIAL DECOMPRESSION, BICEPS TENODESIS;  Surgeon: Amado Avelar MD;  [facility];  Service: Orthopedics    AL TENDON SHEATH INCISION Right 11/15/2016    Procedure: INDEX TRIGGER FINGER RELEASE ;  Surgeon: Todd Silva MD;  [facility];  Service: Orthopedics     Social History   Social History     Substance and Sexual Activity   Alcohol Use No     Social History     Substance and Sexual Activity   Drug Use No     Social History     Tobacco Use   Smoking Status Never   Smokeless Tobacco Never     Family History: non-contributory    Meds/Allergies   all medications and allergies reviewed  Allergies   Allergen Reactions    Tetanus Toxoid Abdominal Pain    Tetanus Toxoids     Tetanus-Diphtheria Toxoids Td Swelling    Fenofibrate Hives, Abdominal Pain and Rash     Generic        Objective   First Vitals:    There were no vitals taken for this visit.      Current Vitals:        No intake or output data in the 24 hours ending 08/02/24 1612      Invasive Devices       Peripheral Intravenous Line  Duration             Peripheral IV 08/01/24  Distal;Right;Upper;Ventral (anterior) Arm 1 day    Peripheral IV 08/01/24 Right;Ventral (anterior) Wrist 1 day                    Physical Exam:  General: No acute distress  Neuro: Awake, Alert and Oriented x 3  HEENT:  Normocephalic, atraumatic, moist mucous membranes  CV: Regular rate and rhythm  Lungs: Normal work of breathing, no increased respiratory effort  Abdomen: Soft, non-tender, non-distended.  Groin: Right groin tender, blanching erythema with induration, and fluctuant, no active drainage or fluctuance.   Extremities:B/l  Palp fem, DP & PT . R thigh and leg compartments are soft  Skin: Warm, dry      Lab Results: I have personally reviewed pertinent lab results.  , CBC:   Lab Results   Component Value Date    WBC 13.38 (H) 08/02/2024    HGB 10.7 (L) 08/02/2024    HCT 32.2 (L) 08/02/2024    MCV 92 08/02/2024     08/02/2024    RBC 3.52 (L) 08/02/2024    MCH 30.4 08/02/2024    MCHC 33.2 08/02/2024    RDW 14.6 08/02/2024    MPV 10.1 08/02/2024    NRBC 0 08/02/2024   , CMP:   Lab Results   Component Value Date    SODIUM 134 (L) 08/02/2024    K 3.9 08/02/2024     08/02/2024    CO2 22 08/02/2024    BUN 30 (H) 08/02/2024    CREATININE 1.11 08/02/2024    CALCIUM 8.6 08/02/2024    AST 23 08/02/2024    ALT 22 08/02/2024    ALKPHOS 100 08/02/2024    EGFR 67 08/02/2024     Imaging: I have personally reviewed pertinent reports.    EKG, Pathology, and Other Studies: I have personally reviewed pertinent reports.      Code Status: Prior  Advance Directive and Living Will:      Power of :    POLST:      Yandel Parker MD  General Surgery Resident

## 2024-08-02 NOTE — ASSESSMENT & PLAN NOTE
Patient with PMH of hypertension, diabetes, DVT, NSTEMI  who recently had Cardiac cath (Status postcardiac cath revealing no significant CAD to suggest NSTEMI per cardiology) done on 7/26 (6 days ago) presented with a 5-day history of fever, 1 day history of red, warm, tender lump in groin.    Lab Results   Component Value Date    WBC 13.38 (H) 08/02/2024    HGB 10.7 (L) 08/02/2024    HCT 32.2 (L) 08/02/2024    MCV 92 08/02/2024     08/02/2024     Plan  Monitor vitals  If hypotensive or tachycardic or worse pain, should obtain stat hemoglobin, do physical exam to make sure hematoma isn't getting worse.   Continue cefepime(2g/50ml dextrose), vancomycin (2000mg)  Continue plan as per vascular recommendation  Patient is scheduled for transfer to [facility] for further assessment and plan as per vascular surgeon request.   Patient transfer to [facility] for incision and drainage of source.

## 2024-08-02 NOTE — ASSESSMENT & PLAN NOTE
Patient with history of fever and chills since last 5 days.  Also experienced night sweats overnight.  1 bout history of cough today.  Had a cardiac cath recently.  Noticed a lump in his groin at the cath site which was red, warm, tender.  Tmax 103 °F noted at home     Plan:  Continue cefepime and vancomycin.  Broad-spectrum antibiotic for now given recent instrumentation and possible infective source being infected hematoma.  Follow-up on blood culture.  Monitor fever curve, white count curve.

## 2024-08-02 NOTE — UTILIZATION REVIEW
Initial Clinical Review    Admission: Date/Time/Statement:   Admission Orders (From admission, onward)       Ordered        08/01/24 1324  INPATIENT ADMISSION  Once                          Orders Placed This Encounter   Procedures    INPATIENT ADMISSION     Standing Status:   Standing     Number of Occurrences:   1     Order Specific Question:   Level of Care     Answer:   Med Surg [16]     Order Specific Question:   Estimated length of stay     Answer:   More than 2 Midnights     Order Specific Question:   Certification     Answer:   I certify that inpatient services are medically necessary for this patient for a duration of greater than two midnights. See H&P and MD Progress Notes for additional information about the patient's course of treatment.     ED Arrival Information       Expected   -    Arrival   8/1/2024 09:39    Acuity   Urgent              Means of arrival   Walk-In    Escorted by   Spouse    Service   Hospitalist    Admission type   Emergency              Arrival complaint   Catheter pain             Chief Complaint   Patient presents with    Wound Check     Had a cath procedure last Friday. Has been having intermittent fevers, highest 102F since then. Today comes in w/ c/o swelling, tenderness/redness in right femoral insertion site.        Initial Presentation: 68 y.o. male to ER from home.    PMH  HTN, DM, hx DVT, obesity, back pain, present w/5D  hx  fever, chills, night sweats and 1 day of red, warm, tender lump in right groin at vascular access site  (pt had Cardiac cath on 7/26,  via R femoral access w/ angioseal closure,  negative for significant coronary disease and was discharged 7/27:    began having fevers  7/28 )      EXAM:   R groin  - 5cm avascular collection, blanching erythema with induration, no active drainage or fluctuance. Palp fem, DP & PT all 2+. R thigh and leg compartments are soft, denies numbness or tingling. Wbc 9.52, Hb 12.5, Cr 1.32. VAS duplex shows widely patent common  femoral and external iliac arteries with no evidence of active pseudoaneurysm. There is also non-vascular structure at the groin measuring approximately 5.0 cm suggestive of hematoma. Concerns for infected groin hematoma.        8/01 8/02 8/01    ADMIT  INPT  STATUS to Internal Medicine,  MS  Level of care for  management of   infected groin hematoma s/p cardiac cath   Will consult Vascular surgery.  Keep npo for possible intervention;  provide IVF hydration,  IV pain control, IVABs, .  Serial exams access site.   Trend wbc and Hgb.  Cont IVABs.  Fup blood cx.  BS management w/accucks qid w/SSI.         8/01  VASCULAR SURGERY:     presentation concerning for R groin deep infection/abscess/infected hematoma without vascular abnormality; may involve closure device (angioseal)   recommend   Transfer to Higher Level of Care/ [facility]   for   I&D,  cont abx.     Date: 8/02      Day 2: 8/02    blood cx w/  GPC in clusters.       DC SUMMARY:    Continue broad-spectrum IV antibiotics as below. Patient denies uncontrolled pain at this time. Also denies fever/chills currently. Did spike high-grade fevers yesterday, along with developed leukocytosis today meeting SIRS criteria in the setting of a suspected groin infected hematoma. Mild creatinine elevation on presentation of 1.32, improved to 1.1 today with a typical baseline of 0.8-0.9. Continue to hold ACEI/HCTZ at this time. On Norvasc for essential hypertension. Continue SSI coverage per Accu-Cheks for history of diabetes mellitus. Remains on Zetia/statin for hyperlipidemia. Replete serum magnesium. Serum sodium: Low but stable at 134. Hemoglobin stable at 10.7 - in the setting of a suspected hematoma, monitor serially and transfuse if necessary.  Patient transfer to [facility] for incision and drainage of source     Vital Signs (last 3 days)       Date/Time Temp Pulse Resp BP MAP (mmHg) SpO2 [place] Coma Scale Score Pain    08/02/24 0727 99.8  °F (37.7 °C) -- -- -- -- -- 15 No Pain    08/02/24 0632 99.8 °F (37.7 °C) 81 18 121/57 82 91 % -- --    08/02/24 0017 99.8 °F (37.7 °C) -- -- -- -- -- -- No Pain    08/02/24 0006 100.2 °F (37.9 °C) 72 18 109/53 77 92 % -- --    08/01/24 1803 99.4 °F (37.4 °C) 66 18 103/54 77 -- 15 4    08/01/24 1712 98.5 °F (36.9 °C) 61 18 100/54 -- 96 % -- No Pain    08/01/24 1630 -- 67 18 103/53 76 97 % -- --    08/01/24 1600 -- 70 18 101/54 76 96 % -- --    08/01/24 1534 99.8 °F (37.7 °C) 69 18 99/52 -- 98 % -- --    08/01/24 1500 -- 78 20 110/55 79 97 % -- --    08/01/24 1410 103.1 °F (39.5 °C)  -- -- -- -- -- -- --    08/01/24 1400 -- 79 20 147/64 92 96 % -- --    08/01/24 1322 102.8 °F (39.3 °C)  -- -- -- -- -- -- --    08/01/24 1300 -- 74 20 117/71 89 97 % -- --    08/01/24 1230 100.1 °F (37.8 °C) -- -- -- -- -- -- --    08/01/24 1130 -- 76 20 117/55 79 93 % -- --    08/01/24 1004 98.4 °F (36.9 °C) 76 20 123/57 -- 98 % -- --              Pertinent Labs/Diagnostic Test Results:   Radiology:  XR chest pa & lateral   Final Interpretation by Татьяна Grey MD (08/01 1656)      No acute cardiopulmonary disease.            Workstation performed: [id number]         VAS DUPLEX EVALUATION FOR PSEUDOANEURYSM   Final Interpretation by Jose Juan Jennings MD (08/01 1631)          Results from last 7 days   Lab Units 08/01/24  1539   SARS-COV-2  Negative     Results from last 7 days   Lab Units 08/02/24  0452 08/01/24  1539 08/01/24  1026 07/27/24  0455 07/26/24  1845   WBC Thousand/uL 13.38*  --  9.52 5.75 5.51   HEMOGLOBIN g/dL 10.7*  --  12.5 11.2* 11.0*   HEMATOCRIT % 32.2*  --  38.0 33.8* 32.9*   PLATELETS Thousands/uL 310 251 316 270 263   TOTAL NEUT ABS Thousands/µL 11.52*  --  8.49*  --   --          Results from last 7 days   Lab Units 08/02/24  0452 08/01/24  1026 07/27/24  0455   SODIUM mmol/L 134* 134* 137   POTASSIUM mmol/L 3.9 3.9 3.6   CHLORIDE mmol/L 103 99 108   CO2 mmol/L 22 22 23   ANION GAP mmol/L 9 13 6    BUN mg/dL 30* 32* 23   CREATININE mg/dL 1.11 1.32* 0.77   EGFR ml/min/1.73sq m 67 55 93   CALCIUM mg/dL 8.6 9.5 8.7   MAGNESIUM mg/dL 1.4*  --  1.3*     Results from last 7 days   Lab Units 08/02/24  0452   AST U/L 23   ALT U/L 22   ALK PHOS U/L 100   TOTAL PROTEIN g/dL 6.2*   ALBUMIN g/dL 3.3*   TOTAL BILIRUBIN mg/dL 0.65     Results from last 7 days   Lab Units 08/02/24  1105 08/02/24  0710 08/01/24  2243 08/01/24  2109 08/01/24  1713 07/27/24  1110 07/27/24  0826 07/26/24  2356 07/26/24  2128 07/26/24  1600   POC GLUCOSE mg/dl 182* 171* 174* 184* 193* 272* 143* 140 184* 165*     Results from last 7 days   Lab Units 08/02/24  0452 08/01/24  1026 07/27/24  0455   GLUCOSE RANDOM mg/dL 162* 232* 135     Results from last 7 days   Lab Units 07/26/24  1512 07/26/24  1246   HS TNI 2HR ng/L  --  234*   HSTNI D2 ng/L  --  151*   HS TNI 4HR ng/L 265*  --    HSTNI D4 ng/L 182*  --      Results from last 7 days   Lab Units 08/01/24  1026   PROCALCITONIN ng/ml 0.15     Results from last 7 days   Lab Units 08/01/24  1026   LACTIC ACID mmol/L 2.0                 Results from last 7 days   Lab Units 07/27/24  0455   FERRITIN ng/mL 41   IRON SATURATION % 31   IRON ug/dL 80   TIBC ug/dL 257     Results from last 7 days   Lab Units 08/01/24  1539   INFLUENZA A PCR  Negative   INFLUENZA B PCR  Negative   RSV PCR  Negative       Results from last 7 days   Lab Units 08/01/24  1015   BLOOD CULTURE  Received in Microbiology Lab. Culture in Progress.   GRAM STAIN RESULT  Gram positive cocci in clusters*             Results from last 7 days   Lab Units 08/02/24  0452   VANCOMYCIN RM ug/mL 18.7       ED Treatment-Medication Administration from 08/01/2024 0939 to 08/01/2024 1730         Date/Time Order Dose Route Action     08/01/2024 1109 vancomycin (VANCOCIN) 2,000 mg in sodium chloride 0.9 % 500 mL IVPB 2,000 mg Intravenous New Bag     08/01/2024 1034 cefepime (MAXIPIME) 2 g/50 mL dextrose IVPB 2,000 mg Intravenous New Bag      08/01/2024 1034 lactated ringers bolus 1,000 mL 1,000 mL Intravenous New Bag     08/01/2024 1105 acetaminophen (TYLENOL) tablet 650 mg 650 mg Oral Given     08/01/2024 1334 ibuprofen (MOTRIN) tablet 400 mg 400 mg Oral Given     08/01/2024 1423 lactated ringers infusion 150 mL/hr Intravenous New Bag     08/01/2024 1600 aspirin (ECOTRIN LOW STRENGTH) EC tablet 81 mg 81 mg Oral Given     08/01/2024 1713 ezetimibe (ZETIA) tablet 10 mg 10 mg Oral Given     08/01/2024 1600 atorvastatin (LIPITOR) tablet 40 mg 40 mg Oral Given     08/01/2024 1558 lactated ringers infusion 125 mL/hr Intravenous New Bag     08/01/2024 1600 enoxaparin (LOVENOX) subcutaneous injection 40 mg 40 mg Subcutaneous Given     08/01/2024 1558 acetaminophen (Ofirmev) injection 1,000 mg 1,000 mg Intravenous New Bag            Past Medical History:   Diagnosis Date    Diabetes mellitus ([facility])     boaderline    DVT (deep venous thrombosis) ([facility])     History of blood clots right leg      june 23 2020    Hypertension     PONV (postoperative nausea and vomiting)     nausea after shoulder surgery     Venous stasis dermatitis of right lower extremity 06/24/2020     Present on Admission:   Elevated serum creatinine   Type 2 diabetes mellitus ([facility])   Hypertension      Admitting Diagnosis: Hematoma [T14.8XXA]  AHMET (acute kidney injury) ([facility]) [N17.9]  Cellulitis of groin, right [L03.314]  Visit for wound check [Z51.89]  Hematoma of groin, initial encounter [S30.1XXA]  Age/Sex: 68 y.o. male    Admission Orders:   see above note     Scheduled Medications:  amLODIPine, 5 mg, Oral, Daily  aspirin, 81 mg, Oral, Daily  atorvastatin, 40 mg, Oral, Daily With Dinner  cefepime, 2,000 mg, Intravenous, Q8H  ezetimibe, 10 mg, Oral, Daily  insulin lispro, 2-12 Units, Subcutaneous, 4x Daily (AC & HS)  vancomycin, 1,000 mg, Intravenous, Q12H      Continuous IV Infusions:  lactated ringers, 125 mL/hr, Intravenous, Continuous      PRN Meds:  acetaminophen, 1,000 mg, Intravenous, Q6H  PRN  ....  8/01  @  2230        IP CONSULT TO PHARMACY  IP CONSULT TO VASCULAR SURGERY  IP CONSULT TO CARDIOLOGY    Network Utilization Review Department  ATTENTION: Please call with any questions or concerns to [phone] and carefully listen to the prompts so that you are directed to the right person. All voicemails are confidential.   For Discharge needs, contact Care Management DC Support Team at [phone] opt. [phone]  Send all requests for admission clinical reviews, approved or denied determinations and any other requests to dedicated fax number below belonging to the campus where the patient is receiving treatment. List of dedicated fax numbers for the Facilities:  FACILITY NAME UR FAX NUMBER   ADMISSION DENIALS (Administrative/Medical Necessity) [phone]   DISCHARGE SUPPORT TEAM (NETWORK) [phone]   PARENT CHILD HEALTH (Maternity/NICU/Pediatrics) [phone]   [facility] [phone]   [facility] [phone]   Critical access hospital [phone]   General acute hospital [phone]   [facility] [phone]   [facility] [phone]   [facility] [phone]   [facility] [phone]   [facility] [phone]   [facility] [phone]   [facility] [phone]   [facility] [phone]

## 2024-08-02 NOTE — OP NOTE
OPERATIVE REPORT  PATIENT NAME: Juan Carlos Cleveland Jr.    :  1955  MRN: [id number]  Pt Location: BE OR ROOM 08    SURGERY DATE: 2024    Surgeons and Role:     * Jenifer Zee MD - Primary     * Jagdish Flynn MD - Assisting     * Iván Bliss MD - Assisting    Preop Diagnosis:  Infected hematoma of right groin    Postop Diagnosis:  Infected hematoma of right groin    Procedure(s):  Groin washout    Specimen(s):  ID Type Source Tests Collected by Time Destination   A : Right groin Tissue Soft Tissue, Other ANAEROBIC CULTURE AND GRAM STAIN, CULTURE, TISSUE AND GRAM STAIN Jenifer Zee MD 2024 1634        Estimated Blood Loss:   Minimal    Anesthesia Type:   General LMA    Operative Indications:  Pt is a 69 yo M w/ HTN, DM, hx DVT, obesity, back pain who presented 1 wk ago with chest pain, back pain, dizziness, diaphoresis. He underwent cardiac cath via R femoral access w/ angioseal closure which was negative for significant coronary disease and patient was discharged the following day. The patient began having fevers the day after discharge and yesterday then noticed redness, tenderness, and lump at R groin access site. He now has positive blood cultures and persistent fevers with a cellulitic and indurated groin. Duplex of the groin shows hematoma without pseudoaneurysm, therefore, he was offered operative washout.     Complications:   None    Procedure and Technique:  The patient was brought to the operative suite and placed in supine position. Preoperative antibiotics were administered. Anesthesia was administered and titrated to effect and an LMA was placed. He was then prepped and draped in usual sterile fashion. A timeout was performed per hospital policy correctly identifying patient, procedure, and laterality.    We began with a 1cm oblique incision above the groin crease at the most fluctuant area. This was carried down into the subcutaneous tissues until a large cavity of  purulence was encountered. This was swabbed and sent for culture. The cavity was digitized circumferentially and all septations were broken up until it felt that there were no further pockets of purulence. The cavity measured approximately [id number]. We thoroughly irrigated the cavity using warm saline until it was clear. We then injected local anesthetic surrounding the cavity. Once we were satisfied, we packed the cavity with iodoform packing strips and applied a sterile dressing of gauze, ABD pad, and tape.     The patient tolerated the procedure well. There were no immediate complications. All counts were correct as reported to me. He was transferred to PACU in stable condition. Of note, Dr. Zee was present for all critical portions of the procedure.     SIGNATURE: Iván Bliss MD  DATE: August 2, 2024  TIME: 4:48 PM

## 2024-08-02 NOTE — OCCUPATIONAL THERAPY NOTE
Occupational Therapy Cancellation       08/02/24 1047   OT Last Visit   OT Visit Date 08/02/24   Note Type   Note type Screen   Additional Comments OT consult received and chart reviewed. Pt independently mobilizing and managing self cares without any challenges; RN confirms. Will remove from OT caseload at this time as there are no skilled OT services needed in this acute care setting. Please reconsult if pt with a change in functional status. Thank you.     Angie Gómez, OT

## 2024-08-02 NOTE — ASSESSMENT & PLAN NOTE
Patient with PMH of hypertension, diabetes, DVT, NSTEMI  who recently had Cardiac cath (Status postcardiac cath revealing no significant CAD to suggest NSTEMI per cardiology) done on 7/26 (6 days ago) presented with a 5-day history of fever, 1 day history of red, warm, tender lump in groin.           Lab Results   Component Value Date     WBC 13.38 (H) 08/02/2024     HGB 10.7 (L) 08/02/2024     HCT 32.2 (L) 08/02/2024     MCV 92 08/02/2024      08/02/2024      Plan  Monitor vitals  Continue cefepime(2g/50ml dextrose), vancomycin (2000mg)  Blood cultures positive for GPC is clusters, BCID is staph sedrick  Repeat blood cultures post op to confirm resolution of bacteremia  Follow up surgical culture results  Consult infectious disease

## 2024-08-02 NOTE — ASSESSMENT & PLAN NOTE
Patient with a history of hypertension, diabetes, DVT and NSTEMI who recently had cardiac catheterization (7/26) 6 days ago presents with right groin swelling, fevers, decreased appetite and headaches.     Plan  Cardiologist consulted  Vascular surgery consulted  Patient is scheduled for transfer to [place] for further assessment and plan as per vascular surgeon request.

## 2024-08-02 NOTE — ASSESSMENT & PLAN NOTE
Lab Results   Component Value Date    HGBA1C 8.0 (H) 07/26/2024       Recent Labs     08/01/24  2109 08/01/24  2243 08/02/24  0710 08/02/24  1105   POCGLU 184* 174* 171* 182*         Blood Sugar Average: Last 72 hrs:  (P) 180.8  POA blood glucose 272.  Home medications include pioglitazone metformin combination.  Hold home medication during hospitalization in light of elevated creatinine.  Monitor blood glucose, insulin per sliding scale ACH

## 2024-08-02 NOTE — ASSESSMENT & PLAN NOTE
Patient with a history of hypertension, diabetes, DVT and NSTEMI who recently had cardiac catheterization (7/26) 6 days ago presents with right groin swelling, fevers, decreased appetite and headaches.     Plan  Cardiologist consulted  Vascular surgery consulted  Patient is scheduled for transfer to [place] for further assessment and plan as per vascular surgeon request.

## 2024-08-02 NOTE — PROGRESS NOTES
Progress Note - Vascular Surgery   Juan Carlos Cleveland Jr. 68 y.o. male MRN: [id number]  Unit/Bed#: -Fátima Encounter: [id number]    Assessment:  69 yo M with R groin hematoma after cardiac cath 7/26  Tmax 103.1 yesterday, AF today so far  WBC 13    Plan:  - rec cards c/s  - keep NPO in event of needing OR washout    Rest of care per primary. Final recs pending attending attestation    Subjective/Objective     Subjective: states pain somewhat better today. Tmax 103 yesterday. No n/v.    Objective:     Blood pressure 121/57, pulse 81, temperature 99.2 °F (37.3 °C), temperature source Oral, resp. rate 18, height 6' (1.829 m), SpO2 91%.,Body mass index is 34.47 kg/m².      Intake/Output Summary (Last 24 hours) at 8/2/2024 1411  Last data filed at 8/2/2024 1034  Gross per 24 hour   Intake 1830 ml   Output 250 ml   Net 1580 ml       Invasive Devices       Peripheral Intravenous Line  Duration             Peripheral IV 08/01/24 Distal;Right;Upper;Ventral (anterior) Arm 1 day    Peripheral IV 08/01/24 Right;Ventral (anterior) Wrist 1 day                    Physical Exam  Vitals and nursing note reviewed.   Constitutional:       General: He is not in acute distress.     Appearance: Normal appearance. He is normal weight. He is not ill-appearing, toxic-appearing or diaphoretic.   HENT:      Head: Normocephalic and atraumatic.   Cardiovascular:      Rate and Rhythm: Normal rate.   Pulmonary:      Effort: Pulmonary effort is normal. No respiratory distress.   Skin:     Capillary Refill: Capillary refill takes less than 2 seconds.   Neurological:      General: No focal deficit present.      Mental Status: He is alert and oriented to person, place, and time.   Psychiatric:         Mood and Affect: Mood normal.         Behavior: Behavior normal.        R groin lump, with surrounding erythema. tender to palpation        Scheduled Meds:  Current Facility-Administered Medications   Medication Dose Route Frequency Provider Last Rate  "   acetaminophen  1,000 mg Intravenous Q6H PRN David Lane MD Stopped (08/01/24 2252)    amLODIPine  5 mg Oral Daily Mihaela Gardner MD      aspirin  81 mg Oral Daily David Lane MD      atorvastatin  40 mg Oral Daily With Dinner David Lane MD      cefepime  2,000 mg Intravenous Q8H David Lane MD 2,000 mg (08/02/24 1034)    ezetimibe  10 mg Oral Daily David Lane MD      insulin lispro  2-12 Units Subcutaneous 4x Daily (AC & HS) David Lane MD      lactated ringers  125 mL/hr Intravenous Continuous David Lane  mL/hr (08/02/24 1034)    vancomycin  1,000 mg Intravenous Q12H Sandy Gonzalez MD 1,000 mg (08/02/24 1120)     Continuous Infusions:lactated ringers, 125 mL/hr, Last Rate: 125 mL/hr (08/02/24 1034)      PRN Meds:.  acetaminophen      Lab, Imaging and other studies:I have personally reviewed pertinent lab results.  , CBC:   Lab Results   Component Value Date    WBC 13.38 (H) 08/02/2024    HGB 10.7 (L) 08/02/2024    HCT 32.2 (L) 08/02/2024    MCV 92 08/02/2024     08/02/2024    RBC 3.52 (L) 08/02/2024    MCH 30.4 08/02/2024    MCHC 33.2 08/02/2024    RDW 14.6 08/02/2024    MPV 10.1 08/02/2024    NRBC 0 08/02/2024   , CMP:   Lab Results   Component Value Date    SODIUM 134 (L) 08/02/2024    K 3.9 08/02/2024     08/02/2024    CO2 22 08/02/2024    BUN 30 (H) 08/02/2024    CREATININE 1.11 08/02/2024    CALCIUM 8.6 08/02/2024    AST 23 08/02/2024    ALT 22 08/02/2024    ALKPHOS 100 08/02/2024    EGFR 67 08/02/2024   , Coagulation: No results found for: \"PT\", \"INR\", \"APTT\", Urinalysis: No results found for: \"COLORU\", \"CLARITYU\", \"SPECGRAV\", \"PHUR\", \"LEUKOCYTESUR\", \"NITRITE\", \"PROTEINUA\", \"GLUCOSEU\", \"KETONESU\", \"BILIRUBINUR\", \"BLOODU\", Amylase: No results found for: \"AMYLASE\"    VTE Mechanical Prophylaxis: sequential compression device      Nahed Serrano PA-C  8/2/2024 2:11 PM    "

## 2024-08-02 NOTE — ANESTHESIA PREPROCEDURE EVALUATION
Procedure:  DEBRIDEMENT LOWER EXTREMITY (WASH OUT) (Right: Groin)  Right groin infection and hematoma S/P cardiac cath with Angioseal closure of right femoral artery 7/26/24   Chest pain -admitted on 7/26/2024 -underwent cardiac catheterization with no significant coronary artery disease identified    Relevant Problems   CARDIO   (+) Cardiac murmur   (+) Chest pain   (+) Hypertension      ENDO   (+) Type 2 diabetes mellitus (HCC)      GI/HEPATIC   (+) BRBPR (bright red blood per rectum)   (+) Dysphagia      MUSCULOSKELETAL   (+) Chronic bilateral low back pain without sciatica      NEURO/PSYCH   (+) Chronic bilateral low back pain without sciatica     Videoscope mac3 gr2av       TTE 7/26/24  MI, CP, DVT, Cardiac murmur, DM II, Elevated troponin.     Interpretation Summary         Left Ventricle: Left ventricular cavity size is normal. Wall thickness is mildly increased. The left ventricular ejection fraction is 65%. Systolic function is normal. Wall motion is normal. Diastolic function is normal for age.  Left atrial filling pressure is normal.    Right Ventricle: Systolic function is normal.    Aortic Valve: The aortic valve is trileaflet. The leaflets are moderately calcified. There is mildly reduced mobility. There is mild stenosis. The aortic valve mean gradient is 15 mmHg. The aortic valve area is 1.83 cm2.    Mitral Valve: There is mild annular calcification.  Physical Exam    Airway    Mallampati score: II  TM Distance: >3 FB  Neck ROM: full     Dental        Cardiovascular  Cardiovascular exam normal    Pulmonary  Pulmonary exam normal     Other Findings        Anesthesia Plan  ASA Score- 2 Emergent    Anesthesia Type- general with ASA Monitors.         Additional Monitors:     Airway Plan:            Plan Factors-Exercise tolerance (METS): >4 METS.    Chart reviewed.   Existing labs reviewed. Patient summary reviewed.          Obstructive sleep apnea risk education given perioperatively.        Induction-      Postoperative Plan- Plan for postoperative opioid use. Planned trial extubation    Perioperative Resuscitation Plan - Level 1 - Full Code.       Informed Consent- Anesthetic plan and risks discussed with patient.  I personally reviewed this patient with the CRNA. Discussed and agreed on the Anesthesia Plan with the CRNA..

## 2024-08-02 NOTE — ASSESSMENT & PLAN NOTE
Lab Results   Component Value Date    HGBA1C 8.0 (H) 07/26/2024       Recent Labs     08/01/24  2109 08/01/24  2243 08/02/24  0710 08/02/24  1105   POCGLU 184* 174* 171* 182*       Blood Sugar Average: Last 72 hrs:  Home medications include pioglitazone metformin combination.  Hold home medication during hospitalization in light of elevated creatinine.  Monitor blood glucose, insulin per sliding scale ACH

## 2024-08-02 NOTE — ANESTHESIA POSTPROCEDURE EVALUATION
Post-Op Assessment Note    CV Status:  Stable    Pain management: adequate       Mental Status:  Alert and awake   Hydration Status:  Stable   PONV Controlled:  Controlled   Airway Patency:  Patent     Post Op Vitals Reviewed: Yes    No anethesia notable event occurred.    Staff: CRNA               BP   137/69   Temp   99.8   Pulse  77   Resp   18   SpO2   97 fm

## 2024-08-02 NOTE — CASE MANAGEMENT
Case Management Assessment & Discharge Planning Note    Patient name Juan Carlos Cleveland Jr.  Location /-01 MRN [id number]  : 1955 Date 2024       Current Admission Date: 2024  Current Admission Diagnosis:Fever   Patient Active Problem List    Diagnosis Date Noted Date Diagnosed    Sepsis (HCC) 2024     Fever 2024     Hematoma 2024     Elevated serum creatinine 2024     Type 2 diabetes mellitus (HCC) 2024     Hypertension 2024     Obesity (BMI 35.0-39.9 without comorbidity) 2024     Chronic bilateral low back pain without sciatica 2024     Cardiac murmur 2024     Other constipation 10/10/2023     BRBPR (bright red blood per rectum) 10/10/2023     Dysphagia 10/10/2023     Blood in stool 2020     History of DVT (deep vein thrombosis) 2020     Localized swelling, mass and lump, right lower limb 2020     Chest pain 2018       LOS (days): 1  Geometric Mean LOS (GMLOS) (days):   Days to GMLOS:     OBJECTIVE:  PATIENT READMITTED TO HOSPITAL  Risk of Unplanned Readmission Score: 8.46         Current admission status: Inpatient       Preferred Pharmacy:   [id number] - [place] - [address]  [facility] [place] [address]  Phone: [phone] Fax: [phone]    Primary Care Provider: Todd Nolasco DO    Primary Insurance: BLUE CROSS  Secondary Insurance: MEDICARE    ASSESSMENT:  Active Health Care Proxies    There are no active Health Care Proxies on file.                 Readmission Root Cause  30 Day Readmission: Yes  Who directed you to return to the hospital?: Family, Self  Did you understand whom to contact if you had questions or problems?: Yes  Did you get your prescriptions before you left the hospital?: Yes  Were you able to get your prescriptions filled when you left the hospital?: Yes  Did you take your medications as prescribed?: Yes  Were you able to get to your follow-up  appointments?: No  Reason:: Readmitted prior to appointment  During previous admission, was a post-acute recommendation made?: No  Patient was readmitted due to: Sepsis  Action Plan: Transfer to [place]    Patient Information  Admitted from:: Home  Mental Status: Alert  During Assessment patient was accompanied by: Spouse  Assessment information provided by:: Patient, Spouse  Primary Caregiver: Self  Support Systems: Self, Spouse/significant other  County of Residence: [place]  What city do you live in?: [place]  Home entry access options. Select all that apply.: Stairs  Number of steps to enter home.: 2  Type of Current Residence: Bi-level  Upon entering residence, is there a bedroom on the main floor (no further steps)?: No  A bedroom is located on the following floor levels of residence (select all that apply):: 2nd Floor  Upon entering residence, is there a bathroom on the main floor (no further steps)?: No  Indicate which floors of current residence have a bathroom (select all the apply):: 2nd Floor  Number of steps to 2nd floor from main floor: 5  Living Arrangements: Lives w/ Spouse/significant other    Activities of Daily Living Prior to Admission  Functional Status: Independent  Completes ADLs independently?: Yes  Ambulates independently?: Yes  Does patient use assisted devices?: No  Does patient currently own DME?: Yes  What DME does the patient currently own?: Bedside Commode, Walker, Straight Cane  Does patient have a history of Outpatient Therapy (PT/OT)?: Yes  Does the patient have a history of Short-Term Rehab?: No  Does patient have a history of HHC?: No  Does patient currently have HHC?: No         Patient Information Continued  Income Source: Employed  Does patient have prescription coverage?: Yes  Does patient receive dialysis treatments?: No  Does patient have a history of substance abuse?: No  Does patient have a history of Mental Health Diagnosis?: No    PHQ 2/9 Screening   Reviewed PHQ  2/9 Depression Screening Score?: No    Means of Transportation  Means of Transport to Appts:: Drives Self      Social Determinants of Health (SDOH)      Flowsheet Row Most Recent Value   Housing Stability    In the last 12 months, was there a time when you were not able to pay the mortgage or rent on time? N   In the past 12 months, how many times have you moved where you were living? 0   At any time in the past 12 months, were you homeless or living in a shelter (including now)? N   Transportation Needs    In the past 12 months, has lack of transportation kept you from medical appointments or from getting medications? no   In the past 12 months, has lack of transportation kept you from meetings, work, or from getting things needed for daily living? No   Food Insecurity    Within the past 12 months, you worried that your food would run out before you got the money to buy more. Never true   Within the past 12 months, the food you bought just didn't last and you didn't have money to get more. Never true   Utilities    In the past 12 months has the electric, gas, oil, or water company threatened to shut off services in your home? No            DISCHARGE DETAILS:    Discharge planning discussed with:: Pt, spouse  Freedom of Choice: Yes  Comments - Freedom of Choice: Pt to be transfered to [facility]  CM contacted family/caregiver?: Yes (Spouse present at bedside)  Were Treatment Team discharge recommendations reviewed with patient/caregiver?: Yes  Did patient/caregiver verbalize understanding of patient care needs?: Yes  Were patient/caregiver advised of the risks associated with not following Treatment Team discharge recommendations?: Yes    Contacts  Patient Contacts: Bassam Rangel  Relationship to Patient:: Other (Comment) (Se[place], spouse)  Contact Method: In Person  Reason/Outcome: Continuity of Care, Emergency Contact, Discharge Planning    Other Referral/Resources/Interventions Provided:  Interventions: Other  (Specify)  Referral Comments: Pt noted to be a 30 day readmission. CM spoke with pt and spouse at bedside, introduced self and role with discharge planning. Pt reported he came back to the hospital because he ahs an infection. Pt denied any issues with transportation or getting medications. Pt reported driving. Pt reported he lives with his spouse in a split level home with 2 ARI. Pt reported there are 5 steps to bed/bath. Pt reported being fully IPTA without AD. Pt reported having a cane, RW, BSC, and grab bars in bathroom. Pt reported a hx of OP PT. Pt reported preferred pharmacy is [facility] and PCP is Todd Nolasco DO. Pt to be transferred to [place]. CM completed medical necessity form for transportation.    Treatment Team Recommendation: Acute Hospital Transfer  Discharge Destination Plan:: Acute Hospital Transfer  Transport at Discharge : [facility]

## 2024-08-02 NOTE — PHYSICAL THERAPY NOTE
Physical Therapy Screen    Patient Name: Juan Carlos Cleveland Jr.  Today's Date: 8/2/2024  Problem List  Principal Problem:    Fever  Active Problems:    Elevated serum creatinine    Type 2 diabetes mellitus (HCC)    Hypertension    Hematoma       08/02/24 1013   PT Last Visit   PT Visit Date 08/02/24   Note Type   Note type Screen   Additional Comments PT consult received and chart reviewed, PT screen completed. Pt's most recent AMPAC = 24, indicating full independence with mobility. Spoke with RN Jazmin whom confirms that pt has been independently mobilizing and there is no need for skilled PT services in the acute care setting. Will screen from PT caseload at this time. Please reconsult if pt's functional status significantly changes.       Deann Collazo, PT, DPT   Available via [employer]  NPI # [id number]  [place] License - [id number]  8/2/2024

## 2024-08-02 NOTE — PLAN OF CARE
Problem: PAIN - ADULT  Goal: Verbalizes/displays adequate comfort level or baseline comfort level  Description: Interventions:  - Encourage patient to monitor pain and request assistance  - Assess pain using appropriate pain scale  - Administer analgesics based on type and severity of pain and evaluate response  - Implement non-pharmacological measures as appropriate and evaluate response  - Consider cultural and social influences on pain and pain management  - Notify physician/advanced practitioner if interventions unsuccessful or patient reports new pain  Outcome: Progressing     Problem: INFECTION - ADULT  Goal: Absence or prevention of progression during hospitalization  Description: INTERVENTIONS:  - Assess and monitor for signs and symptoms of infection  - Monitor lab/diagnostic results  - Monitor all insertion sites, i.e. indwelling lines, tubes, and drains  - Monitor endotracheal if appropriate and nasal secretions for changes in amount and color  - [place] appropriate cooling/warming therapies per order  - Administer medications as ordered  - Instruct and encourage patient and family to use good hand hygiene technique  - Identify and instruct in appropriate isolation precautions for identified infection/condition  Outcome: Progressing  Goal: Absence of fever/infection during neutropenic period  Description: INTERVENTIONS:  - Monitor WBC    Outcome: Progressing     Problem: SAFETY ADULT  Goal: Patient will remain free of falls  Description: INTERVENTIONS:  - Educate patient/family on patient safety including physical limitations  - Instruct patient to call for assistance with activity   - Consult OT/PT to assist with strengthening/mobility   - Keep Call bell within reach  - Keep bed low and locked with side rails adjusted as appropriate  - Keep care items and personal belongings within reach  - Initiate and maintain comfort rounds  - Make Fall Risk Sign visible to staff  - Offer Toileting every 2 Hours,  in advance of need  - Obtain necessary fall risk management equipment: call bell in reach  - Apply yellow socks and bracelet for high fall risk patients  - Consider moving patient to room near nurses station  Outcome: Progressing  Goal: Maintain or return to baseline ADL function  Description: INTERVENTIONS:  -  Assess patient's ability to carry out ADLs; assess patient's baseline for ADL function and identify physical deficits which impact ability to perform ADLs (bathing, care of mouth/teeth, toileting, grooming, dressing, etc.)  - Assess/evaluate cause of self-care deficits   - Assess range of motion  - Assess patient's mobility; develop plan if impaired  - Assess patient's need for assistive devices and provide as appropriate  - Encourage maximum independence but intervene and supervise when necessary  - Involve family in performance of ADLs  - Assess for home care needs following discharge   - Consider OT consult to assist with ADL evaluation and planning for discharge  - Provide patient education as appropriate  Outcome: Progressing  Goal: Maintains/Returns to pre admission functional level  Description: INTERVENTIONS:  - Perform AM-PAC 6 Click Basic Mobility/ Daily Activity assessment daily.  - Set and communicate daily mobility goal to care team and patient/family/caregiver.   - Collaborate with rehabilitation services on mobility goals if consulted  - Perform Range of Motion 3 times a day.  - Reposition patient every 2 hours.  - Dangle patient 3 times a day  - Stand patient 3 times a day  - Ambulate patient 3 times a day  - Out of bed to chair 3 times a day   - Out of bed for meals 3 times a day  - Out of bed for toileting  - Record patient progress and toleration of activity level   Outcome: Progressing     Problem: DISCHARGE PLANNING  Goal: Discharge to home or other facility with appropriate resources  Description: INTERVENTIONS:  - Identify barriers to discharge w/patient and caregiver  - Arrange for  needed discharge resources and transportation as appropriate  - Identify discharge learning needs (meds, wound care, etc.)  - Arrange for interpretive services to assist at discharge as needed  - Refer to Case Management Department for coordinating discharge planning if the patient needs post-hospital services based on physician/advanced practitioner order or complex needs related to functional status, cognitive ability, or social support system  Outcome: Progressing     Problem: Knowledge Deficit  Goal: Patient/family/caregiver demonstrates understanding of disease process, treatment plan, medications, and discharge instructions  Description: Complete learning assessment and assess knowledge base.  Interventions:  - Provide teaching at level of understanding  - Provide teaching via preferred learning methods  Outcome: Progressing

## 2024-08-02 NOTE — H&P
[facility]  H&P  Name: Juan Carlos Cleveland Jr. 68 y.o. male I MRN: [id number]  Unit/Bed#: [facility] 524-01 I Date of Admission: 8/2/2024   Date of Service: 8/2/2024 I Hospital Day: 0      Assessment & Plan   * Hematoma  Assessment & Plan  Patient with a history of hypertension, diabetes, DVT and NSTEMI who recently had cardiac catheterization (7/26) 6 days ago presents with right groin swelling, fevers, decreased appetite and headaches.   Transferred to [facility] for a vascular surgery consultation.  S/p I and D in the OR  Follow up cultures  Continue empiric antibiotics    Sepsis (HCC)  Assessment & Plan  Patient with PMH of hypertension, diabetes, DVT, NSTEMI  who recently had Cardiac cath (Status postcardiac cath revealing no significant CAD to suggest NSTEMI per cardiology) done on 7/26 (6 days ago) presented with a 5-day history of fever, 1 day history of red, warm, tender lump in groin.           Lab Results   Component Value Date     WBC 13.38 (H) 08/02/2024     HGB 10.7 (L) 08/02/2024     HCT 32.2 (L) 08/02/2024     MCV 92 08/02/2024      08/02/2024      Plan  Monitor vitals  Continue cefepime(2g/50ml dextrose), vancomycin (2000mg)  Blood cultures positive for GPC is clusters, BCID is staph sedrick  Repeat blood cultures post op to confirm resolution of bacteremia  Follow up surgical culture results  Consult infectious disease    Hypertension  Assessment & Plan  On home medication of lisinopril hydrochlorothiazide.  Blood pressure is well-controlled at this time.  Given elevated creatinine, hold off on lisinopril hydrochlorothiazide.  In the interim, start amlodipine 5 mg daily.  Monitor vitals, adjust dosing as needed.    Type 2 diabetes mellitus (HCC)  Assessment & Plan  Lab Results   Component Value Date    HGBA1C 8.0 (H) 07/26/2024       Recent Labs     08/01/24  2109 08/01/24  2243 08/02/24  0710 08/02/24  1105   POCGLU 184* 174* 171* 182*       Blood Sugar  Average: Last 72 hrs:  Home medications include pioglitazone metformin combination.  Hold home medication during hospitalization in light of elevated creatinine.  Monitor blood glucose, insulin per sliding scale [facility]      Class 1 obesity in adult  Assessment & Plan  Affects all aspects of his care  Turn q2h  Weight loss counseling when stable as an outpatient         VTE Pharmacologic Prophylaxis:   Moderate Risk (Score 3-4) - Pharmacological DVT Prophylaxis Ordered: heparin.  Code Status: Level 1 - Full Code   Discussion with family: Updated  (wife) at bedside.    Anticipated Length of Stay: Patient will be admitted on an inpatient basis with an anticipated length of stay of greater than 2 midnights secondary to infection.    Total Time Spent on Date of Encounter in care of patient: 50 mins. This time was spent on one or more of the following: performing physical exam; counseling and coordination of care; obtaining or reviewing history; documenting in the medical record; reviewing/ordering tests, medications or procedures; communicating with other healthcare professionals and discussing with patient's family/caregivers.    Chief Complaint: right groin hematoma    History of Present Illness:  Juan Carlos Cleveland Jr. is a 68 y.o. male with a PMH of hypertension, diabetes, DVT who presents with 5-day history of fever, 1 day history of red, warm, tender lump in groin. Patient was recently in the hospital and had a cardiac cath done on 7/26.  Since Sunday, he has been having subjective feeling of fever and then documented history of fever, chills, night sweats.  Tmax at home was documented to be 103 °F.  He also had a temperature more than 100.4 on multiple occasions within this last 5 days.  During the same time he endorses poor p.o. intake.  This morning when he woke up, he took off the bandage from the cath site and noted that his groin was red, hot, and there was a tender lump present.  He also endorses  mild headaches.      Summary of Hospital Course at [facility]:   Patient status post cardiac cath (6 days ago), for chest pain and upward trending troponin, presented with right groin swelling, pain, anorexia, fevers and headaches. Right groin tender to palpation, blanching erythema with induration, no active drainage or fluctuance. Palp fem, DP & PT all 2+. R thigh and leg compartments are soft, denies numbness or tingling. Patient seen by both vascular surgery and cardiology, vascular surgery recommend immediate transfer to [facility] for further evaluation and drainage .     Review of Systems:  Review of Systems   All other systems reviewed and are negative.      Past Medical and Surgical History:   Past Medical History:   Diagnosis Date    Diabetes mellitus (HCC)     boaderline    DVT (deep venous thrombosis) (HCC)     History of blood clots right leg      june 23 2020    Hypertension     PONV (postoperative nausea and vomiting)     nausea after shoulder surgery     Venous stasis dermatitis of right lower extremity 06/24/2020       Past Surgical History:   Procedure Laterality Date    APPENDECTOMY      BACK SURGERY      CARDIAC CATHETERIZATION N/A 7/26/2024    Procedure: Cardiac Coronary Angiogram;  Surgeon: Raymond Noriega MD;  Location: [facility];  Service: Cardiology    COLONOSCOPY  11/19/2016    ESOPHAGOGASTRODUODENOSCOPY      ESOPHAGOGASTRODUODENOSCOPY N/A 10/26/2016    Procedure: ESOPHAGOGASTRODUODENOSCOPY (EGD);  Surgeon: Tito Rutherford MD;  Location: [facility];  Service:     NY COLONOSCOPY FLX DX W/COLLJ SPEC WHEN PFRMD N/A 11/19/2016    Procedure: COLONOSCOPY;  Surgeon: Sandor Hay MD;  Location: [facility] GI [facility];  Service: Gastroenterology    NY EGD BALLOON DILATION ESOPHAGUS <30 MM DIAM N/A 10/26/2016    Procedure: BALLOON DILATION ;  Surgeon: Tito Rutherford MD;  Location: [facility];  Service: Gastroenterology    NY SURGICAL ARTHROSCOPY SHOULDER W/ROTATOR CUFF RPR Left 04/21/2016     Procedure: LEFT SHOULDER ARTHROSCOPIC ROTATOR CUFF REPAIR, SUBACROMIAL DECOMPRESSION, BICEPS TENODESIS;  Surgeon: Amado Avelar MD;  Location: [facility] OR;  Service: Orthopedics    [place] TENDON SHEATH INCISION Right 11/15/2016    Procedure: INDEX TRIGGER FINGER RELEASE ;  Surgeon: Todd Silva MD;  Location: [facility];  Service: Orthopedics       Meds/Allergies:  Prior to Admission medications    Medication Sig Start Date End Date Taking? Authorizing Provider   aspirin (ECOTRIN LOW STRENGTH) 81 mg EC tablet Take 1 tablet (81 mg total) by mouth daily 7/27/24 7/22/25  Ana Mahmood DO   bisacodyl (DULCOLAX) 5 mg EC tablet Take 2 tablets (10 mg total) by mouth once for 1 dose 10/10/23 10/10/23  Sandor Hay MD   ezetimibe (ZETIA) 10 mg tablet Take 10 mg by mouth daily    Historical Provider, MD   lisinopril-hydrochlorothiazide (PRINZIDE,ZESTORETIC) 20-25 MG per tablet Take 1 tablet by mouth daily.    Historical Provider, MD   Multiple Vitamins-Minerals (CENTRUM SILVER PO) Take 1 tablet by mouth daily.    Historical Provider, MD   pioglitazone-metFORMIN (ACTOPLUS MET)  MG per tablet Take 1 tablet by mouth 2 (two) times a day with meals 7/27/24 8/26/24  Ana Mahmood DO   rosuvastatin (CRESTOR) 20 MG tablet  3/28/20   Historical Provider, MD   tadalafil (CIALIS) 5 MG tablet Take 5 mg by mouth daily as needed for erectile dysfunction.    Historical Provider, MD   Tricor 145 MG tablet Take 145 mg by mouth daily  Patient not taking: Reported on 8/1/2024 9/16/21   Historical Provider, MD     I have reviewed home medications using recent Epic encounter.    Allergies:   Allergies   Allergen Reactions    Tetanus Toxoid Abdominal Pain    Tetanus Toxoids     Tetanus-Diphtheria Toxoids Td Swelling    Fenofibrate Hives, Abdominal Pain and Rash     Generic        Social History:  Marital Status: /Civil Union   Occupation: employed  Patient Pre-hospital Living Situation: Home  Patient Pre-hospital Level of Mobility:  sakina  Patient Pre-hospital Diet Restrictions: None  Substance Use History:   Social History     Substance and Sexual Activity   Alcohol Use No     Social History     Tobacco Use   Smoking Status Never   Smokeless Tobacco Never     Social History     Substance and Sexual Activity   Drug Use No       Family History:  No family history on file.    Physical Exam:     Vitals:   Blood Pressure: 128/63 (08/02/24 1815)  Pulse: 70 (08/02/24 1815)  Temperature: 98.6 °F (37 °C) (08/02/24 1815)  Respirations: 20 (08/02/24 1745)  SpO2: 97 % (08/02/24 1815)    Physical Exam  Constitutional:       Appearance: Normal appearance.   HENT:      Head: Normocephalic and atraumatic.      Nose: Nose normal.   Eyes:      Extraocular Movements: Extraocular movements intact.   Cardiovascular:      Rate and Rhythm: Normal rate and regular rhythm.   Pulmonary:      Effort: Pulmonary effort is normal.   Abdominal:      General: There is no distension.   Skin:     General: Skin is warm and dry.   Neurological:      Mental Status: He is alert and oriented to person, place, and time.   Psychiatric:         Mood and Affect: Mood normal.         Behavior: Behavior normal.          Additional Data:     Lab Results:  Results from last 7 days   Lab Units 08/02/24  0452   WBC Thousand/uL 13.38*   HEMOGLOBIN g/dL 10.7*   HEMATOCRIT % 32.2*   PLATELETS Thousands/uL 310   SEGS PCT % 86*   LYMPHO PCT % 4*   MONO PCT % 9   EOS PCT % 0     Results from last 7 days   Lab Units 08/02/24  0452   SODIUM mmol/L 134*   POTASSIUM mmol/L 3.9   CHLORIDE mmol/L 103   CO2 mmol/L 22   BUN mg/dL 30*   CREATININE mg/dL 1.11   ANION GAP mmol/L 9   CALCIUM mg/dL 8.6   ALBUMIN g/dL 3.3*   TOTAL BILIRUBIN mg/dL 0.65   ALK PHOS U/L 100   ALT U/L 22   AST U/L 23   GLUCOSE RANDOM mg/dL 162*         Results from last 7 days   Lab Units 08/02/24  1657 08/02/24  1105 08/02/24  0710 08/01/24  2243 08/01/24  2109 08/01/24  1713 07/27/24  1110 07/27/24  0826 07/26/24  2356  07/26/24  2128   POC GLUCOSE mg/dl 145* 182* 171* 174* 184* 193* 272* 143* 140 184*     Lab Results   Component Value Date    HGBA1C 8.0 (H) 07/26/2024    HGBA1C 6.3 (H) 12/22/2018     Results from last 7 days   Lab Units 08/01/24  1026   LACTIC ACID mmol/L 2.0   PROCALCITONIN ng/ml 0.15       Lines/Drains:  Invasive Devices       Peripheral Intravenous Line  Duration             Peripheral IV 08/01/24 Distal;Right;Upper;Ventral (anterior) Arm 1 day    Peripheral IV 08/01/24 Right;Ventral (anterior) Wrist 1 day                        Imaging: Reviewed radiology reports from this admission including: chest xray and ultrasound(s)  No orders to display       EKG and Other Studies Reviewed on Admission:   EKG:  cardiac cath results reviewed.    ** Please Note: This note has been constructed using a voice recognition system. **

## 2024-08-02 NOTE — PROGRESS NOTES
Juan Carlos Cleveland Jr. is a 68 y.o. male who is currently ordered Vancomycin IV with management by the Pharmacy Consult service.  Relevant clinical data and objective / subjective history reviewed.  Vancomycin Assessment:  Indication and Goal AUC/Trough: Soft tissue (goal -600, trough >10), -600, trough >10  Clinical Status: stable  Micro:     Renal Function:  SCr: 1.11 mg/dL  CrCl: 74.1 mL/min  Renal replacement: Not on dialysis  Days of Therapy: 2  Current Dose: 1750 mg every 24 hours  Vancomycin Plan:  New Dosin mg every 12 hours  Estimated AUC: 427 mcg*hr/mL  Estimated Trough: 13.2 mcg/mL  Next Level:  @ 0600  Renal Function Monitoring: Daily BMP and UOP  Pharmacy will continue to follow closely for s/sx of nephrotoxicity, infusion reactions and appropriateness of therapy.  BMP and CBC will be ordered per protocol. We will continue to follow the patient’s culture results and clinical progress daily.    Bernarda Stapleton, Pharmacist

## 2024-08-02 NOTE — PROGRESS NOTES
Juan Carlos Cleveland Jr. is a 68 y.o. male who is currently ordered Vancomycin IV with management by the Pharmacy Consult service.  Relevant clinical data and objective / subjective history reviewed.  Vancomycin Assessment:  Indication and Goal AUC/Trough: Soft tissue (goal -600, trough >10), -600, trough >10  Clinical Status: stable  Micro:   Anaerobic culture no results; tissue culture no results; COVID, RSV, Flu; blood culture gram pos cocci in clusters, staph aureus detected  Renal Function:  SCr: 1.11 mg/dL  CrCl: 83.4 mL/min  Renal replacement: Not on dialysis  Days of Therapy: 3  Current Dose: 1000 mg IV Q12H  Vancomycin Plan:  New Dosin mg every 12 hours  Estimated AUC: 431 mcg*hr/mL  Estimated Trough: 13.4 mcg/mL  Next Level: 2024 at 0600  Renal Function Monitoring: Daily BMP and UOP  Pharmacy will continue to follow closely for s/sx of nephrotoxicity, infusion reactions and appropriateness of therapy.  BMP and CBC will be ordered per protocol. We will continue to follow the patient’s culture results and clinical progress daily.    Raymond Arteaga, Pharmacist

## 2024-08-02 NOTE — ASSESSMENT & PLAN NOTE
Patient with a history of hypertension, diabetes, DVT and NSTEMI who recently had cardiac catheterization (7/26) 6 days ago presents with right groin swelling, fevers, decreased appetite and headaches.   Transferred to [facility] for a vascular surgery consultation.  S/p I and D in the OR  Follow up cultures  Continue empiric antibiotics

## 2024-08-02 NOTE — DISCHARGE SUMMARY
[facility]  Discharge- Juan Carlos Cleveland Jr. 1955, 68 y.o. male MRN: [id number]  Unit/Bed#: [facility] Encounter: [id number]  Primary Care Provider: Todd Nolasco DO   Date and time admitted to hospital: 8/1/2024  9:58 AM    Hematoma  Assessment & Plan  Patient with a history of hypertension, diabetes, DVT and NSTEMI who recently had cardiac catheterization (7/26) 6 days ago presents with right groin swelling, fevers, decreased appetite and headaches.     Plan  Cardiologist consulted  Vascular surgery consulted  Patient is scheduled for transfer to [place] for further assessment and plan as per vascular surgeon request.     Sepsis (HCC)  Assessment & Plan  Patient with PMH of hypertension, diabetes, DVT, NSTEMI  who recently had Cardiac cath (Status postcardiac cath revealing no significant CAD to suggest NSTEMI per cardiology) done on 7/26 (6 days ago) presented with a 5-day history of fever, 1 day history of red, warm, tender lump in groin.    Lab Results   Component Value Date    WBC 13.38 (H) 08/02/2024    HGB 10.7 (L) 08/02/2024    HCT 32.2 (L) 08/02/2024    MCV 92 08/02/2024     08/02/2024     Plan  Monitor vitals  If hypotensive or tachycardic or worse pain, should obtain stat hemoglobin, do physical exam to make sure hematoma isn't getting worse.   Continue cefepime(2g/50ml dextrose), vancomycin (2000mg)  Continue plan as per vascular recommendation  Patient is scheduled for transfer to [place] for further assessment and plan as per vascular surgeon request.   Patient transfer to [facility] for incision and drainage of source.     Hypertension  Assessment & Plan  On home medication of lisinopril hydrochlorothiazide.  Blood pressure is well-controlled at this time.  Given elevated creatinine, hold off on lisinopril hydrochlorothiazide.  In the interim, start amlodipine 5 mg daily.  Monitor vitals, adjust dosing as needed.    Type 2 diabetes mellitus  "(Formerly [facility])  Assessment & Plan  Lab Results   Component Value Date    HGBA1C 8.0 (H) 07/26/2024       Recent Labs     08/01/24  2109 08/01/24  2243 08/02/24  0710 08/02/24  1105   POCGLU 184* 174* 171* 182*         Blood Sugar Average: Last 72 hrs:  (P) 180.8  POA blood glucose 272.  Home medications include pioglitazone metformin combination.  Hold home medication during hospitalization in light of elevated creatinine.  Monitor blood glucose, insulin per sliding scale [facility]    Elevated serum creatinine  Assessment & Plan  Recent Labs     08/01/24  1026 08/02/24  0452   CREATININE 1.32* 1.11   EGFR 55 67       Estimated Creatinine Clearance: 83.4 mL/min (by C-G formula based on SCr of 1.11 mg/dL).     Patient with baseline creatinine ranging between 1.9 and 1.2.  POA creatinine of 1.32.   Possibly in the setting of poor p.o. intake.  Continue with maintenance fluid at 125 cc/h.  Monitor BMP.    * Fever  Assessment & Plan  Patient with history of fever and chills since last 5 days.  Also experienced night sweats overnight.  1 bout history of cough today.  Had a cardiac cath recently.  Noticed a lump in his groin at the cath site which was red, warm, tender.  Tmax 103 °F noted at home     Plan:  Continue cefepime and vancomycin.  Broad-spectrum antibiotic for now given recent instrumentation and possible infective source being infected hematoma.  Follow-up on blood culture.  Monitor fever curve, white count curve.        Medical Problems       Resolved Problems  Date Reviewed: 8/1/2024   None         Admission Date:   Admission Orders (From admission, onward)       Ordered        08/01/24 1324  INPATIENT ADMISSION  Once                            Admitting Diagnosis: Hematoma [T14.8XXA]  AHMET (acute kidney injury) (Formerly [facility]) [N17.9]  Cellulitis of groin, right [L03.314]  Visit for wound check [Z51.89]  Hematoma of groin, initial encounter [S30.1XXA]    HPI:   Per H&P  \" Juan Carlos Cleveland Jr. is a 68 y.o. male with a PMH of " "hypertension, diabetes, DVT who presents with 5-day history of fever, 1 day history of red, warm, tender lump in groin.  Patient was recently in the hospital and had a cardiac cath done on 7/26.  Since Sunday, he has been having subjective feeling of fever and then documented history of fever, chills, night sweats.  Tmax at home was documented to be 103 °F.  He also had a temperature more than 100.4 on multiple occasions within this last 5 days.  During the same time he endorses poor p.o. intake.  This morning when he woke up, he took off the bandage from the cath site and noted that his groin was red, hot, and there was a tender lump present.  He also endorses mild headaches, does not endorse any sick contacts, cough, sputum production, diarrhea, constipation, lower urinary tract symptoms.  He also denies any chest pain, shortness of breath, pain abdomen, constipation, nausea, vomiting.\"    Procedures Performed: No orders of the defined types were placed in this encounter.      Summary of Hospital Course:   Patient status post cardiac cath (6 days ago), for chest pain and upward trending troponin, presented with right groin swelling, pain, anorexia, fevers and headaches. Right groin tender to palpation, blanching erythema with induration, no active drainage or fluctuance. Palp fem, DP & PT all 2+. R thigh and leg compartments are soft, denies numbness or tingling. Patient was seen and examined at bedside, patient in no signs of acute distress patient denies any overnight complaints or concerns.Patient seen by both vascular surgery and cardiology, vascular surgery recommend immediate transfer to [facility] for further evaluation and drainage .     Subjective:   Patient was seen and examined at bedside, patient in no signs of acute distress patient denies any overnight complaints or concerns. denies any increase in swelling or changes.  Denies any chest pains, palpitation, shortness of breath, leg pain or numbness " and tingling this sensation.      Physical Exam  Constitutional:       General: He is not in acute distress.     Appearance: He is not toxic-appearing.   HENT:      Mouth/Throat:      Mouth: Mucous membranes are moist.   Eyes:      Pupils: Pupils are equal, round, and reactive to light.   Cardiovascular:      Rate and Rhythm: Normal rate. Rhythm irregular.   Pulmonary:      Effort: Pulmonary effort is normal. No respiratory distress.      Breath sounds: No wheezing.   Abdominal:      General: There is no distension.      Palpations: Abdomen is soft.      Tenderness: There is no abdominal tenderness.   Skin:     Findings: Erythema present.   Neurological:      Mental Status: He is alert and oriented to person, place, and time.   Psychiatric:         Thought Content: Thought content normal.            Significant Findings, Care, Treatment and Services Provided: VAS duplex in ER showed hematoma in right groin but no pseudoaneurysm. Received vancomycin and cefepime in ER.     Complications: None    Condition at Discharge: serious         Discharge instructions/Information to patient and family:   See after visit summary for information provided to patient and family.      Provisions for Follow-Up Care:  See after visit summary for information related to follow-up care and any pertinent home health orders.      PCP: Todd Nolasco DO    Disposition:  Transfer to [facility]    Planned Readmission: No    Discharge Statement   I spent 20 minutes discharging the patient. This time was spent on the day of discharge. I had direct contact with the patient on the day of discharge. Additional documentation is required if more than 30 minutes were spent on discharge.     Discharge Medications:  See after visit summary for reconciled discharge medications provided to patient and family.

## 2024-08-03 LAB
ANION GAP SERPL CALCULATED.3IONS-SCNC: 9 MMOL/L (ref 4–13)
BASOPHILS # BLD AUTO: 0.03 THOUSANDS/ÂΜL (ref 0–0.1)
BASOPHILS NFR BLD AUTO: 0 % (ref 0–1)
BUN SERPL-MCNC: 22 MG/DL (ref 5–25)
CALCIUM SERPL-MCNC: 8.4 MG/DL (ref 8.4–10.2)
CHLORIDE SERPL-SCNC: 101 MMOL/L (ref 96–108)
CO2 SERPL-SCNC: 25 MMOL/L (ref 21–32)
CREAT SERPL-MCNC: 0.96 MG/DL (ref 0.6–1.3)
EOSINOPHIL # BLD AUTO: 0.19 THOUSAND/ÂΜL (ref 0–0.61)
EOSINOPHIL NFR BLD AUTO: 2 % (ref 0–6)
ERYTHROCYTE [DISTWIDTH] IN BLOOD BY AUTOMATED COUNT: 14.5 % (ref 11.6–15.1)
GFR SERPL CREATININE-BSD FRML MDRD: 80 ML/MIN/1.73SQ M
GLUCOSE SERPL-MCNC: 171 MG/DL (ref 65–140)
GLUCOSE SERPL-MCNC: 181 MG/DL (ref 65–140)
GLUCOSE SERPL-MCNC: 184 MG/DL (ref 65–140)
GLUCOSE SERPL-MCNC: 255 MG/DL (ref 65–140)
GLUCOSE SERPL-MCNC: 256 MG/DL (ref 65–140)
HCT VFR BLD AUTO: 30.6 % (ref 36.5–49.3)
HGB BLD-MCNC: 10.3 G/DL (ref 12–17)
IMM GRANULOCYTES # BLD AUTO: 0.09 THOUSAND/UL (ref 0–0.2)
IMM GRANULOCYTES NFR BLD AUTO: 1 % (ref 0–2)
LYMPHOCYTES # BLD AUTO: 0.71 THOUSANDS/ÂΜL (ref 0.6–4.47)
LYMPHOCYTES NFR BLD AUTO: 6 % (ref 14–44)
MCH RBC QN AUTO: 30.7 PG (ref 26.8–34.3)
MCHC RBC AUTO-ENTMCNC: 33.7 G/DL (ref 31.4–37.4)
MCV RBC AUTO: 91 FL (ref 82–98)
MONOCYTES # BLD AUTO: 1.02 THOUSAND/ÂΜL (ref 0.17–1.22)
MONOCYTES NFR BLD AUTO: 9 % (ref 4–12)
NEUTROPHILS # BLD AUTO: 9.03 THOUSANDS/ÂΜL (ref 1.85–7.62)
NEUTS SEG NFR BLD AUTO: 82 % (ref 43–75)
NRBC BLD AUTO-RTO: 0 /100 WBCS
PLATELET # BLD AUTO: 308 THOUSANDS/UL (ref 149–390)
PMV BLD AUTO: 9.3 FL (ref 8.9–12.7)
POTASSIUM SERPL-SCNC: 3.8 MMOL/L (ref 3.5–5.3)
RBC # BLD AUTO: 3.35 MILLION/UL (ref 3.88–5.62)
SODIUM SERPL-SCNC: 135 MMOL/L (ref 135–147)
WBC # BLD AUTO: 11.07 THOUSAND/UL (ref 4.31–10.16)

## 2024-08-03 PROCEDURE — 97166 OT EVAL MOD COMPLEX 45 MIN: CPT

## 2024-08-03 PROCEDURE — 82948 REAGENT STRIP/BLOOD GLUCOSE: CPT

## 2024-08-03 PROCEDURE — 99024 POSTOP FOLLOW-UP VISIT: CPT | Performed by: SURGERY

## 2024-08-03 PROCEDURE — 99223 1ST HOSP IP/OBS HIGH 75: CPT | Performed by: STUDENT IN AN ORGANIZED HEALTH CARE EDUCATION/TRAINING PROGRAM

## 2024-08-03 PROCEDURE — 85025 COMPLETE CBC W/AUTO DIFF WBC: CPT | Performed by: INTERNAL MEDICINE

## 2024-08-03 PROCEDURE — 80048 BASIC METABOLIC PNL TOTAL CA: CPT

## 2024-08-03 PROCEDURE — 99232 SBSQ HOSP IP/OBS MODERATE 35: CPT | Performed by: INTERNAL MEDICINE

## 2024-08-03 RX ORDER — CEFAZOLIN SODIUM 2 G/50ML
2000 SOLUTION INTRAVENOUS EVERY 8 HOURS
Status: DISCONTINUED | OUTPATIENT
Start: 2024-08-03 | End: 2024-08-09 | Stop reason: HOSPADM

## 2024-08-03 RX ADMIN — CEFEPIME 2000 MG: 2 INJECTION, POWDER, FOR SOLUTION INTRAVENOUS at 09:35

## 2024-08-03 RX ADMIN — CEFEPIME 2000 MG: 2 INJECTION, POWDER, FOR SOLUTION INTRAVENOUS at 03:15

## 2024-08-03 RX ADMIN — INSULIN LISPRO 6 UNITS: 100 INJECTION, SOLUTION INTRAVENOUS; SUBCUTANEOUS at 11:28

## 2024-08-03 RX ADMIN — INSULIN LISPRO 2 UNITS: 100 INJECTION, SOLUTION INTRAVENOUS; SUBCUTANEOUS at 08:24

## 2024-08-03 RX ADMIN — AMLODIPINE BESYLATE 5 MG: 5 TABLET ORAL at 08:25

## 2024-08-03 RX ADMIN — ATORVASTATIN CALCIUM 40 MG: 40 TABLET, FILM COATED ORAL at 17:01

## 2024-08-03 RX ADMIN — EZETIMIBE 10 MG: 10 TABLET ORAL at 08:24

## 2024-08-03 RX ADMIN — INSULIN LISPRO 6 UNITS: 100 INJECTION, SOLUTION INTRAVENOUS; SUBCUTANEOUS at 17:01

## 2024-08-03 RX ADMIN — INSULIN LISPRO 2 UNITS: 100 INJECTION, SOLUTION INTRAVENOUS; SUBCUTANEOUS at 21:44

## 2024-08-03 RX ADMIN — ASPIRIN 81 MG: 81 TABLET, COATED ORAL at 08:24

## 2024-08-03 RX ADMIN — ENOXAPARIN SODIUM 40 MG: 40 INJECTION SUBCUTANEOUS at 08:24

## 2024-08-03 RX ADMIN — CEFAZOLIN SODIUM 2000 MG: 2 SOLUTION INTRAVENOUS at 17:01

## 2024-08-03 NOTE — PROGRESS NOTES
[facility]  Progress Note  Name: Juan Carlos Cunningham I  MRN: [id number]  Unit/Bed#: [facility] 524-01 I Date of Admission: 8/2/2024   Date of Service: 8/3/2024 I Hospital Day: 1    Assessment & Plan   Sepsis (HCC)  Assessment & Plan  Patient with PMH of hypertension, diabetes, DVT, NSTEMI  who recently had Cardiac cath (Status postcardiac cath revealing no significant CAD to suggest NSTEMI per cardiology) done on 7/26 (6 days ago) presented with a 5-day history of fever, 1 day history of red, warm, tender lump in groin.           Lab Results   Component Value Date     WBC 13.38 (H) 08/02/2024     HGB 10.7 (L) 08/02/2024     HCT 32.2 (L) 08/02/2024     MCV 92 08/02/2024      08/02/2024      Plan  Monitor vitals  Continue cefepime(2g/50ml dextrose), vancomycin (2000mg)  Blood cultures positive for GPC is clusters, BCID is staph sedrick  Repeat blood cultures post op to confirm resolution of bacteremia  Follow up surgical culture results  Consult infectious disease  Patient antibiotics adjusted from cefepime vancomycin to cefazolin due to MSSA 2 out of 2 and blood cultures, echo ordered    Class 1 obesity in adult  Assessment & Plan  Affects all aspects of his care  Turn q2h  Weight loss counseling when stable as an outpatient    Hypertension  Assessment & Plan  On home medication of lisinopril hydrochlorothiazide.  Blood pressure is well-controlled at this time.  Given elevated creatinine, hold off on lisinopril hydrochlorothiazide.  In the interim, start amlodipine 5 mg daily.  Monitor vitals, adjust dosing as needed.    Type 2 diabetes mellitus (HCC)  Assessment & Plan  Lab Results   Component Value Date    HGBA1C 8.0 (H) 07/26/2024       Recent Labs     08/02/24  1657 08/02/24  2053 08/03/24  0613 08/03/24  1045   POCGLU 145* 192* 184* 255*         Blood Sugar Average: Last 72 hrs:  (P) 194Home medications include pioglitazone metformin combination.  Hold home medication  during hospitalization in light of elevated creatinine.  Monitor blood glucose, insulin per sliding scale [facility]      * Hematoma  Assessment & Plan  Patient with a history of hypertension, diabetes, DVT and NSTEMI who recently had cardiac catheterization (7/26) 6 days ago presents with right groin swelling, fevers, decreased appetite and headaches.   Transferred to [facility] for a vascular surgery consultation.  S/p I and D in the OR  Follow up cultures  .  Vancomycin adjusted to cefazolin due to MSSA blood culture positive               VTE Pharmacologic Prophylaxis:   Moderate Risk (Score 3-4) - Pharmacological DVT Prophylaxis Ordered: enoxaparin (Lovenox).    Mobility:   Basic Mobility Inpatient Raw Score: 24  JH-HLM Goal: 8: Walk 250 feet or more  JH-HLM Achieved: 7: Walk 25 feet or more  JH-HLM Goal achieved. Continue to encourage appropriate mobility.    Patient Centered Rounds: I performed bedside rounds with nursing staff today.   Discussions with Specialists or Other Care Team Provider:     Education and Discussions with Family / Patient: Updated  (significant other) at bedside.    Total Time Spent on Date of Encounter in care of patient:  mins. This time was spent on one or more of the following: performing physical exam; counseling and coordination of care; obtaining or reviewing history; documenting in the medical record; reviewing/ordering tests, medications or procedures; communicating with other healthcare professionals and discussing with patient's family/caregivers.    Current Length of Stay: 1 day(s)  Current Patient Status: Inpatient   Certification Statement: The patient will continue to require additional inpatient hospital stay due to iv abx  Discharge Plan: Anticipate discharge in >72 hrs to discharge location to be determined pending rehab evaluations.    Code Status: Level 1 - Full Code    Subjective:   Patient denies any acute complaints today    Objective:     Vitals:   Temp  (24hrs), Av.2 °F (37.3 °C), Min:98.4 °F (36.9 °C), Max:99.9 °F (37.7 °C)    Temp:  [98.4 °F (36.9 °C)-99.9 °F (37.7 °C)] 98.4 °F (36.9 °C)  HR:  [65-79] 65  Resp:  [12-21] 16  BP: (126-154)/(59-75) 146/75  SpO2:  [92 %-98 %] 94 %  There is no height or weight on file to calculate BMI.     Input and Output Summary (last 24 hours):     Intake/Output Summary (Last 24 hours) at 8/3/2024 1409  Last data filed at 8/3/2024 1217  Gross per 24 hour   Intake 1080 ml   Output 25 ml   Net 1055 ml       Physical Exam:   Physical Exam  Vitals and nursing note reviewed.   Constitutional:       General: He is not in acute distress.     Appearance: He is well-developed. He is not toxic-appearing or diaphoretic.   HENT:      Head: Normocephalic and atraumatic.   Eyes:      General: No scleral icterus.     Conjunctiva/sclera: Conjunctivae normal.   Cardiovascular:      Rate and Rhythm: Normal rate and regular rhythm.      Heart sounds: No murmur heard.     No friction rub. No gallop.   Pulmonary:      Effort: Pulmonary effort is normal. No respiratory distress.      Breath sounds: Normal breath sounds. No stridor. No wheezing, rhonchi or rales.   Chest:      Chest wall: No tenderness.   Abdominal:      General: There is no distension.      Palpations: Abdomen is soft. There is no mass.      Tenderness: There is no abdominal tenderness. There is no guarding or rebound.      Hernia: No hernia is present.   Musculoskeletal:         General: No swelling or tenderness.      Cervical back: Neck supple.   Skin:     General: Skin is warm and dry.      Capillary Refill: Capillary refill takes less than 2 seconds.   Neurological:      Mental Status: He is alert and oriented to person, place, and time.   Psychiatric:         Mood and Affect: Mood normal.          Additional Data:     Labs:  Results from last 7 days   Lab Units 24  0517   WBC Thousand/uL 11.07*   HEMOGLOBIN g/dL 10.3*   HEMATOCRIT % 30.6*   PLATELETS Thousands/uL 308    SEGS PCT % 82*   LYMPHO PCT % 6*   MONO PCT % 9   EOS PCT % 2     Results from last 7 days   Lab Units 08/03/24  0517 08/02/24  0452   SODIUM mmol/L 135 134*   POTASSIUM mmol/L 3.8 3.9   CHLORIDE mmol/L 101 103   CO2 mmol/L 25 22   BUN mg/dL 22 30*   CREATININE mg/dL 0.96 1.11   ANION GAP mmol/L 9 9   CALCIUM mg/dL 8.4 8.6   ALBUMIN g/dL  --  3.3*   TOTAL BILIRUBIN mg/dL  --  0.65   ALK PHOS U/L  --  100   ALT U/L  --  22   AST U/L  --  23   GLUCOSE RANDOM mg/dL 181* 162*         Results from last 7 days   Lab Units 08/03/24  1045 08/03/24  0613 08/02/24  2053 08/02/24  1657 08/02/24  1105 08/02/24  0710 08/01/24  2243 08/01/24  2109 08/01/24  1713   POC GLUCOSE mg/dl 255* 184* 192* 145* 182* 171* 174* 184* 193*         Results from last 7 days   Lab Units 08/01/24  1026   LACTIC ACID mmol/L 2.0   PROCALCITONIN ng/ml 0.15       Lines/Drains:  Invasive Devices       Peripheral Intravenous Line  Duration             Peripheral IV 08/01/24 Distal;Right;Upper;Ventral (anterior) Arm 2 days    Peripheral IV 08/01/24 Right;Ventral (anterior) Wrist 2 days                          Imaging: No pertinent imaging reviewed.    Recent Cultures (last 7 days):   Results from last 7 days   Lab Units 08/02/24  1930 08/02/24  1916 08/02/24  1634 08/01/24  1015   BLOOD CULTURE  Received in Microbiology Lab. Culture in Progress. Received in Microbiology Lab. Culture in Progress.  --  Staphylococcus aureus*  Staphylococcus aureus*   GRAM STAIN RESULT   --   --  2+ Disintegrating polys*  2+ Gram positive cocci in clusters* Gram positive cocci in clusters*  Gram positive cocci in clusters*       Last 24 Hours Medication List:   Current Facility-Administered Medications   Medication Dose Route Frequency Provider Last Rate    acetaminophen  1,000 mg Intravenous Q6H PRN Jagdish Flynn MD      amLODIPine  5 mg Oral Daily Jagdish Flynn MD      aspirin  81 mg Oral Daily Jagdish Flynn MD      atorvastatin  40 mg Oral Daily With  Dinner Jagdish Flynn MD      cefazolin  2,000 mg Intravenous Q8H Velma Carpenter MD      enoxaparin  40 mg Subcutaneous Daily Jagdish Flynn MD      ezetimibe  10 mg Oral Daily Jagdish Flynn MD      insulin lispro  2-12 Units Subcutaneous 4x Daily (AC & HS) Jagdish Flynn MD          Today, Patient Was Seen By: Manfred Alvarado DO    **Please Note: This note may have been constructed using a voice recognition system.**

## 2024-08-03 NOTE — ASSESSMENT & PLAN NOTE
Patient with PMH of hypertension, diabetes, DVT, NSTEMI  who recently had Cardiac cath (Status postcardiac cath revealing no significant CAD to suggest NSTEMI per cardiology) done on 7/26 (6 days ago) presented with a 5-day history of fever, 1 day history of red, warm, tender lump in groin.           Lab Results   Component Value Date     WBC 13.38 (H) 08/02/2024     HGB 10.7 (L) 08/02/2024     HCT 32.2 (L) 08/02/2024     MCV 92 08/02/2024      08/02/2024      Plan  Monitor vitals  Continue cefepime(2g/50ml dextrose), vancomycin (2000mg)  Blood cultures positive for GPC is clusters, BCID is staph sedrick  Repeat blood cultures post op to confirm resolution of bacteremia  Follow up surgical culture results  Consult infectious disease  Patient antibiotics adjusted from cefepime vancomycin to cefazolin due to MSSA 2 out of 2 and blood cultures, echo ordered

## 2024-08-03 NOTE — PROGRESS NOTES
Progress Note - Vascular Surgery   Juan Carlos Cleveland Jr. 68 y.o. male MRN: [id number]  Unit/Bed#: [facility] 524-01 Encounter: [id number]    Assessment:  68 y.o. male with Hx of HTN, DM, DVT. 5-days of fever, chills, night sweats and 1 day of red, warm, tender lump in right groin after recent cardiac cath done on 7/26 with R femoral access. Vascular surgery consulted with concern for infected R groin hematoma as source for bacteremia and sepsis    8/2/24 s/p R groin exploration, I&D of purulence without communication with arterial structures noted    WBC 11.07 (13.38)  Hgb 12.3 (10.7)  sCr 0.96 (1.11)    8/1 Blood cultures Gram (+) cocci in clusters  8/2 Preliminary OR Cx: Gram (+) cocci in clusters    Recommendations:  Patient doing well s/p I&D  - Plan for packing change today  - f/u Blood Cultures, OR cultures  - IV ABX per primary, currently maintained on Cefepime/Vancomycin  - Continue ASA/Statin/Zetia  -  Will discuss with Dr Zee    Subjective: Patient without acute complaints, mild tenderness to R groin, denies fevers, chills, chest pain.     Vitals:  /61   Pulse 69   Temp 99.4 °F (37.4 °C)   Resp 20   SpO2 92%     I/Os:  I/O last 3 completed shifts:  In: 600 [I.V.:600]  Out: 25 [Blood:25]  I/O this shift:  In: 240 [P.O.:240]  Out: -     Lab Results and Cultures:   CBC with diff:   Lab Results   Component Value Date    WBC 11.07 (H) 08/03/2024    HGB 10.3 (L) 08/03/2024    HCT 30.6 (L) 08/03/2024    MCV 91 08/03/2024     08/03/2024    RBC 3.35 (L) 08/03/2024    MCH 30.7 08/03/2024    MCHC 33.7 08/03/2024    RDW 14.5 08/03/2024    MPV 9.3 08/03/2024    NRBC 0 08/03/2024   ,   BMP/CMP:  Lab Results   Component Value Date    SODIUM 135 08/03/2024    SODIUM 134 (L) 05/08/2024    K 3.8 08/03/2024    K 4.1 05/08/2024     08/03/2024     05/08/2024    CO2 25 08/03/2024    CO2 22 05/08/2024    BUN 22 08/03/2024    BUN 22 05/08/2024    CREATININE 0.96 08/03/2024    CREATININE 0.93 05/08/2024     "CALCIUM 8.4 08/03/2024    CALCIUM 9.0 05/08/2024    AST 23 08/02/2024    AST 16 04/22/2024    ALT 22 08/02/2024    ALT 15 04/22/2024    ALKPHOS 100 08/02/2024    ALKPHOS 40 04/22/2024    EGFR 80 08/03/2024    EGFR 89 05/08/2024    EGFR 63 12/31/2019   ,   Lipid Panel: No results found for: \"CHOL\",   Coags: No results found for: \"PT\", \"PTT\", \"INR\",     Blood Culture:   Lab Results   Component Value Date    BLOODCX Received in Microbiology Lab. Culture in Progress. 08/02/2024   ,   Urinalysis: No results found for: \"COLORU\", \"CLARITYU\", \"SPECGRAV\", \"PHUR\", \"LEUKOCYTESUR\", \"NITRITE\", \"PROTEINUA\", \"GLUCOSEU\", \"KETONESU\", \"BILIRUBINUR\", \"BLOODU\",   Urine Culture: No results found for: \"URINECX\",   Wound Culure: No results found for: \"WOUNDCULT\"    Medications:  Current Facility-Administered Medications   Medication Dose Route Frequency    acetaminophen (Ofirmev) injection 1,000 mg  1,000 mg Intravenous Q6H PRN    amLODIPine (NORVASC) tablet 5 mg  5 mg Oral Daily    aspirin (ECOTRIN LOW STRENGTH) EC tablet 81 mg  81 mg Oral Daily    atorvastatin (LIPITOR) tablet 40 mg  40 mg Oral Daily With Dinner    ceFEPime (MAXIPIME) 2,000 mg in dextrose 5 % 50 mL IVPB  2,000 mg Intravenous Q8H    enoxaparin (LOVENOX) subcutaneous injection 40 mg  40 mg Subcutaneous Daily    ezetimibe (ZETIA) tablet 10 mg  10 mg Oral Daily    insulin lispro (HumALOG/ADMELOG) 100 units/mL subcutaneous injection 2-12 Units  2-12 Units Subcutaneous 4x Daily (AC & HS)    lactated ringers bolus 500 mL  500 mL Intravenous Once PRN    And    lactated ringers bolus 500 mL  500 mL Intravenous Once PRN    lactated ringers infusion  100 mL/hr Intravenous Continuous    sodium chloride 0.9 % bolus 500 mL  500 mL Intravenous Once PRN    And    sodium chloride 0.9 % bolus 500 mL  500 mL Intravenous Once PRN    vancomycin (VANCOCIN) IVPB (premix in dextrose) 1,000 mg 200 mL  1,000 mg Intravenous Q12H       Imaging:  Reviewed    Physical Exam:  General appearance: " alert and oriented, in no acute distress  Neurologic: Grossly neurologically intact  Neck: supple, symmetrical, trachea midline  Lungs:  Normal work of breathing, no accessory muscle use  Heart:  Regular rate and rhythm  Abdomen:  Soft, nontender, nondistended abdomen. R groin with erythema, packing in place. Clean and dry ABD placed overtop  Extremities: Bilateral LE edematous (has bilateral LE edema at baseline). Bilateral LE Warm and dry, M/S intact     Pulse exam:  DP: Right: 2+ Left: 2+    Charu Estrada PA-C  8/3/2024

## 2024-08-03 NOTE — ASSESSMENT & PLAN NOTE
Lab Results   Component Value Date    HGBA1C 8.0 (H) 07/26/2024       Recent Labs     08/02/24  1657 08/02/24  2053 08/03/24  0613 08/03/24  1045   POCGLU 145* 192* 184* 255*         Blood Sugar Average: Last 72 hrs:  (P) 194Home medications include pioglitazone metformin combination.  Hold home medication during hospitalization in light of elevated creatinine.  Monitor blood glucose, insulin per sliding scale [facility]

## 2024-08-03 NOTE — OCCUPATIONAL THERAPY NOTE
Occupational Therapy Evaluation     Patient Name: Juan Carlos Cleveland Jr.  Today's Date: 8/3/2024  Problem List  Principal Problem:    Hematoma  Active Problems:    Type 2 diabetes mellitus (HCC)    Hypertension    Class 1 obesity in adult    Sepsis (HCC)    Past Medical History  Past Medical History:   Diagnosis Date    Diabetes mellitus (HCC)     boaderline    DVT (deep venous thrombosis) (HCC)     History of blood clots right leg      june 23 2020    Hypertension     PONV (postoperative nausea and vomiting)     nausea after shoulder surgery     Venous stasis dermatitis of right lower extremity 06/24/2020     Past Surgical History  Past Surgical History:   Procedure Laterality Date    APPENDECTOMY      BACK SURGERY      CARDIAC CATHETERIZATION N/A 7/26/2024    Procedure: Cardiac Coronary Angiogram;  Surgeon: Raymond Noriega MD;  [facility];  Service: Cardiology    COLONOSCOPY  11/19/2016    ESOPHAGOGASTRODUODENOSCOPY      ESOPHAGOGASTRODUODENOSCOPY N/A 10/26/2016    Procedure: ESOPHAGOGASTRODUODENOSCOPY (EGD);  Surgeon: Tito Rutherford MD;  [facility];  Service:     NM COLONOSCOPY FLX DX W/COLLJ SPEC WHEN PFRMD N/A 11/19/2016    Procedure: COLONOSCOPY;  Surgeon: Sandor Hay MD;  [facility];  Service: Gastroenterology    NM EGD BALLOON DILATION ESOPHAGUS <30 MM DIAM N/A 10/26/2016    Procedure: BALLOON DILATION ;  Surgeon: Tito Rutherford MD;  [facility];  Service: Gastroenterology    NM SURGICAL ARTHROSCOPY SHOULDER W/ROTATOR CUFF RPR Left 04/21/2016    Procedure: LEFT SHOULDER ARTHROSCOPIC ROTATOR CUFF REPAIR, SUBACROMIAL DECOMPRESSION, BICEPS TENODESIS;  Surgeon: Amado Avelar MD;  [facility];  Service: Orthopedics    NM TENDON SHEATH INCISION Right 11/15/2016    Procedure: INDEX TRIGGER FINGER RELEASE ;  Surgeon: Todd Silva MD;  [facility];  Service: Orthopedics         08/03/24 1012   OT Last Visit   OT Visit Date 08/03/24   Note Type    Note type Evaluation   Pain Assessment   Pain Assessment Tool 0-10   Pain Score 3   Pain Location/Orientation Orientation: Right;Location: Groin   Pain Onset/Description Onset: Ongoing;Descriptor: Aching;Descriptor: Discomfort   Patient's Stated Pain Goal No pain   Hospital Pain Intervention(s) Repositioned;Ambulation/increased activity;Emotional support   Multiple Pain Sites No   Restrictions/Precautions   Weight Bearing Precautions Per Order No   Other Precautions Multiple lines;Pain   Home Living   Type of Home House   Home Layout Two level;Bed/bath upstairs  (2 ARI)   Bathroom Shower/Tub Tub/shower unit   Bathroom Toilet Raised   Bathroom Equipment Grab bars in shower;Commode;Grab bars around toilet   Home Equipment Walker;Cane  (unused PTA)   Additional Comments Pt reports living in bilevel home w/ 2 ARI, 2nd floor bed/bath w/ 5 steps up   Prior Function   Level of [place] Independent with ADLs;Independent with functional mobility;Independent with IADLS   Lives With Spouse   Receives Help From Family   IADLs Independent with meal prep;Independent with medication management;Independent with driving   Falls in the last 6 months 1 to 4   Vocational Full time employment   Comments (+)    Lifestyle   Autonomy I w/ ADLS/IADLS, transfers and functional mobility pTA   Reciprocal Relationships Pt lives w/ his spouse   Service to Others works full time; building [employer]   Intrinsic Gratification model trains   ADL   Eating Assistance 7  Independent   Grooming Assistance 7  Independent   UB Bathing Assistance 5  Supervision/Setup   LB Bathing Assistance 5  Supervision/Setup   UB Dressing Assistance 5  Supervision/Setup   LB Dressing Assistance 5  Supervision/Setup   Toileting Assistance  5  Supervision/Setup   Functional Assistance 5  Supervision/Setup   Bed Mobility   Supine to Sit 6  Modified independent   Additional items HOB elevated   Sit to Supine Unable to assess   Additional Comments pt sat EOB w/ G  balance/trunk control   Transfers   Sit to Stand 5  Supervision   Additional items Verbal cues   Stand to Sit 5  Supervision   Additional items Verbal cues   Additional Comments no AD/DME used   Functional Mobility   Functional Mobility 5  Supervision   Additional Comments pt engaged in short household distance functional mobility w/ S; no AD/DME used   Balance   Static Sitting Good   Dynamic Sitting Fair +   Static Standing Fair   Dynamic Standing Fair   Ambulatory Fair   Activity Tolerance   Activity Tolerance Patient tolerated treatment well   Medical Staff Made Aware RN   Nurse Made Aware yes, Rimi   RUE Assessment   RUE Assessment WFL   LUE Assessment   LUE Assessment WFL   Hand Function   Gross Motor Coordination Functional   Fine Motor Coordination Functional   Sensation   Light Touch No apparent deficits   Vision-Basic Assessment   Current Vision Wears glasses all the time   Psychosocial   Psychosocial (WDL) WDL   Cognition   Overall Cognitive Status WFL   Arousal/Participation Responsive;Cooperative   Attention Within functional limits   Orientation Level Oriented X4   Memory Within functional limits   Following Commands Follows all commands and directions without difficulty   Comments pt is pleasant and cooperative   Assessment   Limitation Decreased endurance;Decreased high-level ADLs   Prognosis Fair   Assessment Pt is a 67 y/o male seen for OT eval s/p adm to [facility] w/ 5 day history of fever, 1 day hx of red, warm, tender lump in groin. Of note, pt had cardiac cath done 7/26 w/ R femoral access, concern now for infected R groin hematoma. Pt transferred to [facility] from [facility] for further evaluation and drainage. Pt is s/p a groin washout on 8/2. Pt  has a past medical history of Diabetes mellitus ([facility]), DVT (deep venous thrombosis) ([facility]), History of blood clots right leg, Hypertension, PONV (postoperative nausea and vomiting), and Venous stasis dermatitis of right lower extremity (06/24/2020). Pt with active OT  orders and activity as tolerated orders. Pt lives with his spouse in bilevel home w/ 2 ARI, 2nd floor setup w/ 5 steps up. Pt was I w/  ADLS and IADLS, drove, & required no use of DME PTA but has RW and cane available if needed. Pt is not currently demonstrating any significant deficits in occupational performance at this time. Overall pt is functioning at a S level for functional tasks w/o use of AD/DME. Pt reports no major concerns regarding D/C home w/ family support once medically stable. Pt w/ no further immediate acute skilled OT needs; will D/C OT at this time.   Goals   Patient Goals to go home   Discharge Recommendation   Rehab Resource Intensity Level, OT No post-acute rehabilitation needs   Additional Comments  The patient's raw score on the AM-PAC Daily Activity Inpatient Short Form is 24. A raw score of greater than or equal to 19 suggests the patient may benefit from discharge to home. Please refer to the recommendation of the Occupational Therapist for safe discharge planning.   AM-PAC Daily Activity Inpatient   Lower Body Dressing 4   Bathing 4   Toileting 4   Upper Body Dressing 4   Grooming 4   Eating 4   Daily Activity Raw Score 24   Daily Activity Standardized Score (Calc for Raw Score >=11) 57.54   AM-PAC Applied Cognition Inpatient   Following a Speech/Presentation 4   Understanding Ordinary Conversation 4   Taking Medications 4   Remembering Where Things Are Placed or Put Away 4   Remembering List of 4-5 Errands 4   Taking Care of Complicated Tasks 4   Applied Cognition Raw Score 24   Applied Cognition Standardized Score 62.21   End of Consult   Education Provided Yes   Patient Position at End of Consult Bedside chair;All needs within reach   Nurse Communication Nurse aware of consult     Janice Cordero MS, OTR/L

## 2024-08-03 NOTE — CONSULTS
Consultation - Infectious Disease   Juan Carlos Cleveland Jr. 68 y.o. male MRN: [id number]  Unit/Bed#: [facility] 524-01 Encounter: [id number]      IMPRESSION & RECOMMENDATIONS:     1.  Fever.  Due to #2/3 below.  Fever improved with antibiotics and status post right groin I&D.  The patient is hemodynamically stable.   -Stop IV vancomycin and cefepime and start IV cefazolin 2 g every 8 hours   -Follow-up blood cultures   -Check TTE   -Monitor right groin wound, vascular surgery following   -Repeat CBC tomorrow to monitor treatment response   -Monitor fever curve    2.  Gram-positive bacteremia.  Both sets admission blood cultures growing gram-positive cocci in clusters, identified as MSSA on BCID.  Due to infected right groin hematoma.  No intravascular prosthetic devices present.   -Cefazolin as above   -Follow-up blood cultures   -Check TTE   -Anticipate at least a 4-week course of IV antibiotics    3.  Infected right groin hematoma.  Following cardiac catheterization 7/26 with right femoral artery access.  Lower extremity arterial duplex showed a right groin hematoma, no pseudoaneurysm or AV fistula.  Status post right groin washout with vascular surgery 8/2/2024, Gram stain also shows GPC's in clusters likely Staphylococcus aureus.   -Antibiotics as above   -Vascular surgery follow-up ongoing   -Monitor right groin site    4.  Type 2 diabetes mellitus.  Hemoglobin A1c 8%.  Risk factor for infection.  Glycemic control per primary team.    5.  AHMET.  Creatinine on admission 1.32.  Likely prerenal.  Creatinine improved to baseline with IV fluids.   -Monitor creatinine, dose adjust antibiotics as needed    I have discussed the above management plan to narrow antibiotics to IV cefazolin, check TTE with Dr. Alvarado who is in agreement.  Discussed with the patient at bedside.  ID will follow.    I have performed an extensive review of the medical records in Epic including review of the notes, radiographs, and laboratory results      HISTORY OF PRESENT ILLNESS:  Reason for Consult: MSSA bacteremia, right groin infected hematoma  HPI: Juan Carlos Cleveland Jr. is a 68-year-old man with a history of diabetes mellitus, hypertension, DVT who presented to [facility] and was subsequently transferred to [facility] due to 5 days of fevers at home and development of a tender, erythematous lump in the right groin.  The patient recently underwent cardiac catheterization 7/26/2024 due to chest pain and troponin elevation.  About 2 days after the procedure, the patient developed fever and chills at home.  The morning of admission, the patient took the bandage off his right groin access site and it was swollen, erythematous, tender.  The patient states that he has issues with fungal rash in the groin during the summer and his skin has been irritated.  On presentation the patient was febrile to 103.1. Lower extremity arterial duplex showed a right groin hematoma, no evidence of pseudoaneurysm or arteriovenous fistula.  He was started on vancomycin and cefepime.  Patient transferred to [facility] for vascular surgery evaluation.  The patient was taken to the OR 8/2/2024 for right groin I&D of an infected hematoma.  The cavity did not communicate with the arterial structures.    REVIEW OF SYSTEMS:  A complete review of systems is negative other than that noted in the HPI.    PAST MEDICAL HISTORY:  Past Medical History:   Diagnosis Date    Diabetes mellitus (HCC)     boaderline    DVT (deep venous thrombosis) ([facility])     History of blood clots right leg      june 23 2020    Hypertension     PONV (postoperative nausea and vomiting)     nausea after shoulder surgery     Venous stasis dermatitis of right lower extremity 06/24/2020     Past Surgical History:   Procedure Laterality Date    APPENDECTOMY      BACK SURGERY      CARDIAC CATHETERIZATION N/A 7/26/2024    Procedure: Cardiac Coronary Angiogram;  Surgeon: Raymond Noriega MD;  [facility];   Service: Cardiology    COLONOSCOPY  2016    ESOPHAGOGASTRODUODENOSCOPY      ESOPHAGOGASTRODUODENOSCOPY N/A 10/26/2016    Procedure: ESOPHAGOGASTRODUODENOSCOPY (EGD);  Surgeon: Tito Rutherford MD;  [facility];  Service:     SC COLONOSCOPY FLX DX W/COLLJ SPEC WHEN PFRMD N/A 2016    Procedure: COLONOSCOPY;  Surgeon: Sandor Hay MD;  [facility];  Service: Gastroenterology    SC EGD BALLOON DILATION ESOPHAGUS <30 MM DIAM N/A 10/26/2016    Procedure: BALLOON DILATION ;  Surgeon: Tito Rutherford MD;  [facility];  Service: Gastroenterology    SC SURGICAL ARTHROSCOPY SHOULDER W/ROTATOR CUFF RPR Left 2016    Procedure: LEFT SHOULDER ARTHROSCOPIC ROTATOR CUFF REPAIR, SUBACROMIAL DECOMPRESSION, BICEPS TENODESIS;  Surgeon: Amado Avelar MD;  [facility];  Service: Orthopedics    SC TENDON SHEATH INCISION Right 11/15/2016    Procedure: INDEX TRIGGER FINGER RELEASE ;  Surgeon: Todd Silva MD;  [facility];  Service: Orthopedics       FAMILY HISTORY:  Non-contributory    SOCIAL HISTORY:  Social History   Social History     Substance and Sexual Activity   Alcohol Use No     Social History     Substance and Sexual Activity   Drug Use No     Social History     Tobacco Use   Smoking Status Never   Smokeless Tobacco Never       ALLERGIES:  Allergies   Allergen Reactions    Tetanus Toxoid Abdominal Pain    Tetanus Toxoids     Tetanus-Diphtheria Toxoids Td Swelling    Fenofibrate Hives, Abdominal Pain and Rash     Generic        MEDICATIONS:  All current active medications have been reviewed.    PHYSICAL EXAM:  Temp:  [98.4 °F (36.9 °C)-99.9 °F (37.7 °C)] 98.4 °F (36.9 °C)  HR:  [65-79] 65  Resp:  [12-21] 16  BP: (126-154)/(59-75) 146/75  SpO2:  [92 %-98 %] 94 %  Temp (24hrs), Av.2 °F (37.3 °C), Min:98.4 °F (36.9 °C), Max:99.9 °F (37.7 °C)  Current: Temperature: 98.4 °F (36.9 °C)    Intake/Output Summary (Last 24 hours) at 8/3/2024 1134  Last data filed at 2024  1957  Gross per 24 hour   Intake 840 ml   Output 25 ml   Net 815 ml       General Appearance:  Appearing well, nontoxic, and in no distress   Head:  Normocephalic, without obvious abnormality, atraumatic   Eyes:  Conjunctiva pink and sclera anicteric, both eyes   Nose: Nares normal, mucosa normal, no drainage   Throat: Oropharynx moist without lesions   Neck: Supple, symmetrical, no adenopathy, no tenderness/mass/nodules   Back:   Symmetric, no curvature, ROM normal, no CVA tenderness   Lungs:   Clear to auscultation bilaterally, respirations unlabored   Chest Wall:  No tenderness or deformity   Heart:  RRR; no murmur, rub or gallop   Abdomen:   Soft, non-tender, non-distended, positive bowel sounds    Extremities: No cyanosis, clubbing or edema   Skin: Right groin erythema with dressing in place   Lymph nodes: Cervical, supraclavicular nodes normal   Neurologic: Alert and oriented times 3, extremity strength 5/5 and symmetric       LABS, IMAGING, & OTHER STUDIES:  Lab Results:  I have personally reviewed pertinent labs.  Results from last 7 days   Lab Units 08/03/24  0517 08/02/24  0452 08/01/24  1539 08/01/24  1026   WBC Thousand/uL 11.07* 13.38*  --  9.52   HEMOGLOBIN g/dL 10.3* 10.7*  --  12.5   PLATELETS Thousands/uL 308 310 251 316     Results from last 7 days   Lab Units 08/03/24  0517 08/02/24  0452 08/01/24  1026   SODIUM mmol/L 135 134* 134*   POTASSIUM mmol/L 3.8 3.9 3.9   CHLORIDE mmol/L 101 103 99   CO2 mmol/L 25 22 22   BUN mg/dL 22 30* 32*   CREATININE mg/dL 0.96 1.11 1.32*   EGFR ml/min/1.73sq m 80 67 55   CALCIUM mg/dL 8.4 8.6 9.5   AST U/L  --  23  --    ALT U/L  --  22  --    ALK PHOS U/L  --  100  --      Results from last 7 days   Lab Units 08/02/24  1930 08/02/24  1916 08/02/24  1634 08/01/24  1015   BLOOD CULTURE  Received in Microbiology Lab. Culture in Progress. Received in Microbiology Lab. Culture in Progress.  --   --    GRAM STAIN RESULT   --   --  2+ Disintegrating polys*  2+ Gram  positive cocci in clusters* Gram positive cocci in clusters*  Gram positive cocci in clusters*     Results from last 7 days   Lab Units 08/01/24  1026   PROCALCITONIN ng/ml 0.15                   Imaging Studies:   I have personally reviewed pertinent imaging study reports and images in PACS.  Lower extremity arterial duplex: Right groin hematoma, no evidence of pseudoaneurysm or arteriovenous fistula

## 2024-08-03 NOTE — ASSESSMENT & PLAN NOTE
Patient with a history of hypertension, diabetes, DVT and NSTEMI who recently had cardiac catheterization (7/26) 6 days ago presents with right groin swelling, fevers, decreased appetite and headaches.   Transferred to [facility] for a vascular surgery consultation.  S/p I and D in the OR  Follow up cultures  .  Vancomycin adjusted to cefazolin due to MSSA blood culture positive

## 2024-08-03 NOTE — QUICK NOTE
Post-Op Check - Vascular Surgery   Juan Carlos Cleveland Jr. 68 y.o. male MRN: [id number]  Unit/Bed#: [facility] 524-01 Encounter: [id number]    ASSESMENT:  68yoM s/p R groin exploration, I&D, iodoform packing in setting of R groin erythema, tenderness; operative finding of purulence without communication with arterial structures    PLAN:  - Incentive spirometry  - Diet Cardiac as tolerated  - PRN analgesia  - I/Os  - Lovenox for VTE Prophylaxis    Subjective:  Patient seen and examined at bedside without acute complaints. Some serosanguineous strikethrough at dressing which was changed by nursing at the bedside. Patient denies fevers, chills, nausea, with mild pain to R groin with palpation. Denies paresthesias, motor weakness    Vitals:  /61   Pulse 69   Temp 99.4 °F (37.4 °C)   Resp 20   SpO2 92%     Physical Exam:  General appearance: alert and oriented, in no acute distress  Neurologic: Grossly neurologically intact  Neck: supple, symmetrical, trachea midline  Lungs:  Normal work of breathing, no accessory muscle use  Heart:  Regular rate and rhythm  Abdomen:  Soft, nontender, nondistended abdomen. R groin with erythema, packing in place. Clean and dry ABD placed overtop  Extremities: Bilateral LE edematous (has bilateral LE edema at baseline). Bilateral LE Warm and dry, M/S intact    Pulse exam:  DP: Right: 2+ Left: 2+    I/Os:  I/O last 3 completed shifts:  In: 600 [I.V.:600]  Out: 25 [Blood:25]  I/O this shift:  In: 240 [P.O.:240]  Out: -     Invasive Lines/Tubes:  Invasive Devices       Peripheral Intravenous Line  Duration             Peripheral IV 08/01/24 Distal;Right;Upper;Ventral (anterior) Arm 1 day    Peripheral IV 08/01/24 Right;Ventral (anterior) Wrist 1 day                    VTE Prophylaxis: Enoxaparin (Lovenox)    Charu Estrada PA-C  8/2/2024\

## 2024-08-03 NOTE — PROGRESS NOTES
Juan Carlos Cleveland Jr. is a 68 y.o. male who is currently ordered Vancomycin IV with management by the Pharmacy Consult service.  Relevant clinical data and objective / subjective history reviewed.  Vancomycin Assessment:  Indication and Goal AUC/Trough: Soft tissue (goal -600, trough >10), -600, trough >10  Clinical Status: stable  Micro:   Anaerobic culture no results; tissue culture no results; COVID, RSV, Flu; blood culture gram pos cocci in clusters, staph aureus detected  Renal Function: slight increase  SCr: 0.96 mg/dL (was 1.11 mg/dL)  CrCl: 96.5 mL/min (was 83.4 mL/min)  Renal replacement: Not on dialysis  Days of Therapy: 4  Current Dose: 1000 mg IV Q12H  Vancomycin Plan: due to slight increase in renal function and the predicted AUC < 400, we will increase the vancomycin to 1250 mg q 12 hr with the next dose scheduled for noon today (8/3)  New Dosin mg q 12 hr  Estimated AUC: 471 mcg hr/mL  Estimated Trough: 14 mcg/mL  Next Level: 2024 at 0600  Renal Function Monitoring: Daily BMP and UOP  Pharmacy will continue to follow closely for s/sx of nephrotoxicity, infusion reactions and appropriateness of therapy.  BMP and CBC will be ordered per protocol. We will continue to follow the patient’s culture results and clinical progress daily.    Clifford Gonsalez, R.Ph., Pharmacist

## 2024-08-04 ENCOUNTER — APPOINTMENT (INPATIENT)
Dept: NON INVASIVE DIAGNOSTICS | Facility: HOSPITAL | Age: 69
DRG: 857 | End: 2024-08-04
Payer: COMMERCIAL

## 2024-08-04 LAB
AORTIC VALVE MEAN VELOCITY: 18.7 M/S
APICAL FOUR CHAMBER EJECTION FRACTION: 67 %
AV AREA BY CONTINUOUS VTI: 2.3 CM2
AV AREA PEAK VELOCITY: 2 CM2
AV LVOT MEAN GRADIENT: 3 MMHG
AV LVOT PEAK GRADIENT: 6 MMHG
AV MEAN GRADIENT: 16 MMHG
AV PEAK GRADIENT: 29 MMHG
AV VALVE AREA: 2.32 CM2
AV VELOCITY RATIO: 0.45
BACTERIA BLD CULT: ABNORMAL
BACTERIA BLD CULT: ABNORMAL
BACTERIA SPEC ANAEROBE CULT: NORMAL
BACTERIA TISS AEROBE CULT: ABNORMAL
BSA FOR ECHO PROCEDURE: 2.36 M2
DOP CALC AO PEAK VEL: 2.69 M/S
DOP CALC AO VTI: 57.78 CM
DOP CALC LVOT AREA: 4.52 CM2
DOP CALC LVOT CARDIAC INDEX: 3.38 L/MIN/M2
DOP CALC LVOT CARDIAC OUTPUT: 7.98 L/MIN
DOP CALC LVOT DIAMETER: 2.4 CM
DOP CALC LVOT PEAK VEL VTI: 29.68 CM
DOP CALC LVOT PEAK VEL: 1.21 M/S
DOP CALC LVOT STROKE INDEX: 58.1 ML/M2
DOP CALC LVOT STROKE VOLUME: 134.2
GLUCOSE SERPL-MCNC: 182 MG/DL (ref 65–140)
GLUCOSE SERPL-MCNC: 197 MG/DL (ref 65–140)
GLUCOSE SERPL-MCNC: 235 MG/DL (ref 65–140)
GLUCOSE SERPL-MCNC: 257 MG/DL (ref 65–140)
GRAM STN SPEC: ABNORMAL
LAAS-AP2: 25.9 CM2
LAAS-AP4: 25.7 CM2
LEFT ATRIUM VOLUME (MOD BIPLANE): 81 ML
LEFT ATRIUM VOLUME INDEX (MOD BIPLANE): 34.3 ML/M2
RIGHT ATRIUM AREA SYSTOLE A4C: 19.3 CM2
RIGHT VENTRICLE ID DIMENSION: 4.4 CM
S AUREUS DNA BLD POS QL NAA+NON-PROBE: DETECTED
SL CV LEFT ATRIUM LENGTH A2C: 6.7 CM
SL CV LV EF: 65
TR MAX PG: 19 MMHG
TR PEAK VELOCITY: 2.2 M/S
TRICUSPID VALVE PEAK REGURGITATION VELOCITY: 2.2 M/S

## 2024-08-04 PROCEDURE — 93308 TTE F-UP OR LMTD: CPT

## 2024-08-04 PROCEDURE — 93325 DOPPLER ECHO COLOR FLOW MAPG: CPT

## 2024-08-04 PROCEDURE — 93325 DOPPLER ECHO COLOR FLOW MAPG: CPT | Performed by: INTERNAL MEDICINE

## 2024-08-04 PROCEDURE — 93321 DOPPLER ECHO F-UP/LMTD STD: CPT | Performed by: INTERNAL MEDICINE

## 2024-08-04 PROCEDURE — 93321 DOPPLER ECHO F-UP/LMTD STD: CPT

## 2024-08-04 PROCEDURE — 99232 SBSQ HOSP IP/OBS MODERATE 35: CPT | Performed by: INTERNAL MEDICINE

## 2024-08-04 PROCEDURE — 93308 TTE F-UP OR LMTD: CPT | Performed by: INTERNAL MEDICINE

## 2024-08-04 PROCEDURE — 99024 POSTOP FOLLOW-UP VISIT: CPT | Performed by: SURGERY

## 2024-08-04 PROCEDURE — 82948 REAGENT STRIP/BLOOD GLUCOSE: CPT

## 2024-08-04 RX ORDER — MORPHINE SULFATE 4 MG/ML
4 INJECTION, SOLUTION INTRAMUSCULAR; INTRAVENOUS ONCE
Status: COMPLETED | OUTPATIENT
Start: 2024-08-04 | End: 2024-08-04

## 2024-08-04 RX ADMIN — ATORVASTATIN CALCIUM 40 MG: 40 TABLET, FILM COATED ORAL at 17:23

## 2024-08-04 RX ADMIN — CEFAZOLIN SODIUM 2000 MG: 2 SOLUTION INTRAVENOUS at 01:11

## 2024-08-04 RX ADMIN — INSULIN LISPRO 2 UNITS: 100 INJECTION, SOLUTION INTRAVENOUS; SUBCUTANEOUS at 11:53

## 2024-08-04 RX ADMIN — MORPHINE SULFATE 4 MG: 4 INJECTION INTRAVENOUS at 08:06

## 2024-08-04 RX ADMIN — ASPIRIN 81 MG: 81 TABLET, COATED ORAL at 08:51

## 2024-08-04 RX ADMIN — INSULIN LISPRO 6 UNITS: 100 INJECTION, SOLUTION INTRAVENOUS; SUBCUTANEOUS at 17:24

## 2024-08-04 RX ADMIN — CEFAZOLIN SODIUM 2000 MG: 2 SOLUTION INTRAVENOUS at 17:23

## 2024-08-04 RX ADMIN — EZETIMIBE 10 MG: 10 TABLET ORAL at 08:51

## 2024-08-04 RX ADMIN — CEFAZOLIN SODIUM 2000 MG: 2 SOLUTION INTRAVENOUS at 09:02

## 2024-08-04 RX ADMIN — INSULIN LISPRO 2 UNITS: 100 INJECTION, SOLUTION INTRAVENOUS; SUBCUTANEOUS at 08:52

## 2024-08-04 RX ADMIN — INSULIN LISPRO 4 UNITS: 100 INJECTION, SOLUTION INTRAVENOUS; SUBCUTANEOUS at 21:10

## 2024-08-04 RX ADMIN — AMLODIPINE BESYLATE 5 MG: 5 TABLET ORAL at 08:51

## 2024-08-04 RX ADMIN — ENOXAPARIN SODIUM 40 MG: 40 INJECTION SUBCUTANEOUS at 08:51

## 2024-08-04 NOTE — CONSULTS
Juan Carlos Cleveland Jr. is a 68 y.o. male who has been receiving Vancomycin IV with management by the Pharmacy Consult service.  The patient's vancomycin therapy has been discontinued. Thank you for allowing us to take part in this patient's care. Pharmacy will sign off now.     Hedy Murillo, Pharmacist

## 2024-08-04 NOTE — ASSESSMENT & PLAN NOTE
Lab Results   Component Value Date    HGBA1C 8.0 (H) 07/26/2024       Recent Labs     08/03/24  2115 08/04/24  0650 08/04/24  1134 08/04/24  1544   POCGLU 171* 182* 197* 257*         Blood Sugar Average: Last 72 hrs:  (P) [id number] medications include pioglitazone metformin combination.  Hold home medication during hospitalization in light of elevated creatinine.  Monitor blood glucose, insulin per sliding scale [facility]

## 2024-08-04 NOTE — PROGRESS NOTES
[facility]  Progress Note  Name: Juan Carlos Cunningham I  MRN: [id number]  Unit/Bed#: [facility] 524-01 I Date of Admission: 8/2/2024   Date of Service: 8/4/2024 I Hospital Day: 2    Assessment & Plan   Sepsis (HCC)  Assessment & Plan  Patient with PMH of hypertension, diabetes, DVT, NSTEMI  who recently had Cardiac cath (Status postcardiac cath revealing no significant CAD to suggest NSTEMI per cardiology) done on 7/26 (6 days ago) presented with a 5-day history of fever, 1 day history of red, warm, tender lump in groin.           Lab Results   Component Value Date     WBC 13.38 (H) 08/02/2024     HGB 10.7 (L) 08/02/2024     HCT 32.2 (L) 08/02/2024     MCV 92 08/02/2024      08/02/2024      Plan  Monitor vitals  Continue cefepime(2g/50ml dextrose), vancomycin (2000mg)  Blood cultures positive for GPC is clusters, BCID is staph sedrick  Repeat blood cultures post op to confirm resolution of bacteremia  Follow up surgical culture results  Consult infectious disease  Patient antibiotics adjusted from cefepime vancomycin to cefazolin due to MSSA 2 out of 2 and blood cultures, echo ordered    Class 1 obesity in adult  Assessment & Plan  Affects all aspects of his care  Turn q2h  Weight loss counseling when stable as an outpatient    Hypertension  Assessment & Plan  On home medication of lisinopril hydrochlorothiazide.  Blood pressure is well-controlled at this time.  Given elevated creatinine, hold off on lisinopril hydrochlorothiazide.  In the interim, start amlodipine 5 mg daily.  Monitor vitals, adjust dosing as needed.    Type 2 diabetes mellitus (HCC)  Assessment & Plan  Lab Results   Component Value Date    HGBA1C 8.0 (H) 07/26/2024       Recent Labs     08/03/24  2115 08/04/24  0650 08/04/24  1134 08/04/24  1544   POCGLU 171* 182* 197* 257*         Blood Sugar Average: Last 72 hrs:  (P) [id number] medications include pioglitazone metformin combination.  Hold home  medication during hospitalization in light of elevated creatinine.  Monitor blood glucose, insulin per sliding scale [facility]      * Hematoma  Assessment & Plan  Patient with a history of hypertension, diabetes, DVT and NSTEMI who recently had cardiac catheterization (7/26) 6 days ago presents with right groin swelling, fevers, decreased appetite and headaches.   Transferred to [facility] for a vascular surgery consultation.  S/p I and D in the OR  Follow up cultures  .  Vancomycin adjusted to cefazolin due to MSSA blood culture positive               VTE Pharmacologic Prophylaxis:   High Risk (Score >/= 5) - Pharmacological DVT Prophylaxis Ordered: enoxaparin (Lovenox). Sequential Compression Devices Ordered.    Mobility:   Basic Mobility Inpatient Raw Score: 24  JH-HLM Goal: 8: Walk 250 feet or more  JH-HLM Achieved: 8: Walk 250 feet ot more  JH-HLM Goal achieved. Continue to encourage appropriate mobility.    Patient Centered Rounds: I performed bedside rounds with nursing staff today.   Discussions with Specialists or Other Care Team Provider:     Education and Discussions with Family / Patient: Patient declined call to .     Total Time Spent on Date of Encounter in care of patient:  mins. This time was spent on one or more of the following: performing physical exam; counseling and coordination of care; obtaining or reviewing history; documenting in the medical record; reviewing/ordering tests, medications or procedures; communicating with other healthcare professionals and discussing with patient's family/caregivers.    Current Length of Stay: 2 day(s)  Current Patient Status: Inpatient   Certification Statement: The patient will continue to require additional inpatient hospital stay due to iv abx, echo review  Discharge Plan: Anticipate discharge in 48-72 hrs to home with home services.    Code Status: Level 1 - Full Code    Subjective:   Patient denies any acute complaints today    Objective:      Vitals:   Temp (24hrs), Av.3 °F (36.8 °C), Min:98.2 °F (36.8 °C), Max:98.5 °F (36.9 °C)    Temp:  [98.2 °F (36.8 °C)-98.5 °F (36.9 °C)] 98.2 °F (36.8 °C)  HR:  [61-65] 65  Resp:  [16] 16  BP: (100-145)/(56-74) 100/56  SpO2:  [93 %-97 %] 97 %  Body mass index is 34.45 kg/m².     Input and Output Summary (last 24 hours):     Intake/Output Summary (Last 24 hours) at 2024 191  Last data filed at 2024 1733  Gross per 24 hour   Intake 860 ml   Output 600 ml   Net 260 ml       Physical Exam:   Physical Exam  Vitals and nursing note reviewed.   Constitutional:       General: He is not in acute distress.     Appearance: He is well-developed. He is not toxic-appearing or diaphoretic.   HENT:      Head: Normocephalic and atraumatic.   Eyes:      General: No scleral icterus.     Conjunctiva/sclera: Conjunctivae normal.   Cardiovascular:      Rate and Rhythm: Normal rate and regular rhythm.      Heart sounds: No murmur heard.     No friction rub. No gallop.   Pulmonary:      Effort: Pulmonary effort is normal. No respiratory distress.      Breath sounds: Normal breath sounds. No stridor. No wheezing, rhonchi or rales.   Chest:      Chest wall: No tenderness.   Abdominal:      General: There is no distension.      Palpations: Abdomen is soft. There is no mass.      Tenderness: There is no abdominal tenderness. There is no guarding or rebound.      Hernia: No hernia is present.   Musculoskeletal:         General: No swelling or tenderness.      Cervical back: Neck supple.      Comments: Right groin dressing c/d/i   Skin:     General: Skin is warm and dry.      Capillary Refill: Capillary refill takes less than 2 seconds.   Neurological:      Mental Status: He is alert and oriented to person, place, and time.   Psychiatric:         Mood and Affect: Mood normal.          Additional Data:     Labs:  Results from last 7 days   Lab Units 24  0517   WBC Thousand/uL 11.07*   HEMOGLOBIN g/dL 10.3*   HEMATOCRIT %  30.6*   PLATELETS Thousands/uL 308   SEGS PCT % 82*   LYMPHO PCT % 6*   MONO PCT % 9   EOS PCT % 2     Results from last 7 days   Lab Units 08/03/24  0517 08/02/24  0452   SODIUM mmol/L 135 134*   POTASSIUM mmol/L 3.8 3.9   CHLORIDE mmol/L 101 103   CO2 mmol/L 25 22   BUN mg/dL 22 30*   CREATININE mg/dL 0.96 1.11   ANION GAP mmol/L 9 9   CALCIUM mg/dL 8.4 8.6   ALBUMIN g/dL  --  3.3*   TOTAL BILIRUBIN mg/dL  --  0.65   ALK PHOS U/L  --  100   ALT U/L  --  22   AST U/L  --  23   GLUCOSE RANDOM mg/dL 181* 162*         Results from last 7 days   Lab Units 08/04/24  1544 08/04/24  1134 08/04/24  0650 08/03/24  2115 08/03/24  1530 08/03/24  1045 08/03/24  0613 08/02/24  2053 08/02/24  1657 08/02/24  1105 08/02/24  0710 08/01/24  2243   POC GLUCOSE mg/dl 257* 197* 182* 171* 256* 255* 184* 192* 145* 182* 171* 174*         Results from last 7 days   Lab Units 08/01/24  1026   LACTIC ACID mmol/L 2.0   PROCALCITONIN ng/ml 0.15       Lines/Drains:  Invasive Devices       Peripheral Intravenous Line  Duration             Peripheral IV 08/01/24 Distal;Right;Upper;Ventral (anterior) Arm 3 days    Peripheral IV 08/01/24 Right;Ventral (anterior) Wrist 3 days                          Imaging: No pertinent imaging reviewed.    Recent Cultures (last 7 days):   Results from last 7 days   Lab Units 08/02/24  1930 08/02/24  1916 08/02/24  1634 08/01/24  1015   BLOOD CULTURE  No Growth at 24 hrs. No Growth at 24 hrs.  --  Staphylococcus aureus*  Staphylococcus aureus*   GRAM STAIN RESULT   --   --  2+ Disintegrating polys*  2+ Gram positive cocci in clusters* Gram positive cocci in clusters*  Gram positive cocci in clusters*       Last 24 Hours Medication List:   Current Facility-Administered Medications   Medication Dose Route Frequency Provider Last Rate    acetaminophen  1,000 mg Intravenous Q6H PRN Jagdish Flynn MD      amLODIPine  5 mg Oral Daily Jagdish Flynn MD      aspirin  81 mg Oral Daily Jagdish Flynn MD       atorvastatin  40 mg Oral Daily With Dinner Jagdish Flynn MD      cefazolin  2,000 mg Intravenous Q8H Velma Carpenter MD 2,000 mg (08/04/24 1723)    enoxaparin  40 mg Subcutaneous Daily Jagdish Flynn MD      ezetimibe  10 mg Oral Daily Jagdsih Flynn MD      insulin lispro  2-12 Units Subcutaneous 4x Daily (AC & HS) Jagdish Flynn MD          Today, Patient Was Seen By: Manfred Alvarado DO    **Please Note: This note may have been constructed using a voice recognition system.**

## 2024-08-04 NOTE — ASSESSMENT & PLAN NOTE
Patient with a history of hypertension, diabetes, DVT and NSTEMI who recently had cardiac catheterization (7/26) 6 days ago presents with right groin swelling, fevers, decreased appetite and headaches.   Transferred to [facility] for a vascular surgery consultation.  S/p I and D in the OR  Follow up cultures  .  Vancomycin adjusted to cefazolin due to MSSA blood culture positive

## 2024-08-04 NOTE — PROGRESS NOTES
Progress Note - Vascular Surgery   Juan Carlos Cleveland Jr. 68 y.o. male MRN: [id number]  Unit/Bed#: [facility] 524-01 Encounter: [id number]    Assessment:  Juan Carlos Cleveland Jr. is a 68 y.o. male w/ hx of HTN, DM, DVT, CAD s/p cardiac cath via R-fem access c/b right groin hematoma now presenting w/ bacteremia and right groin hematoma infection.      8/2 - Right groin incision, drainage, and exploration     Plan:  Right groin infection   Dressing/packing change with increased purulence today 8/4, wound thoroughly irrigated with hydrogen peroxide/betadine solution and re-packed with 1/2in packing strip  Continue with local wound care and daily pressing changes   Bacteremia w/ MSSA, tissue cx w/ staph aureus   ID input appreciated   Continue Ancef   Continue aspirin and statin therapy   Glucose control   PRN pain control   Encourage ambulation  DVT ppx:  Incentive spirometry 10 times/hour while awake  Remainder of care per primary team     Subjective/Objective    S/E: No acute events overnight. Has been OOB and ambulatory, no significant issues w/ ambulation, pain controlled, denies f/c, CP, SOB, paresthesias, or motor/sensory changes.     Objective:     Blood pressure 145/74, pulse 61, temperature 98.2 °F (36.8 °C), resp. rate 16, height 6' (1.829 m), weight 115 kg (254 lb), SpO2 93%.,Body mass index is 34.45 kg/m².      Intake/Output Summary (Last 24 hours) at 8/4/2024 0922  Last data filed at 8/4/2024 0826  Gross per 24 hour   Intake 760 ml   Output 300 ml   Net 460 ml       Invasive Devices       Peripheral Intravenous Line  Duration             Peripheral IV 08/01/24 Distal;Right;Upper;Ventral (anterior) Arm 2 days    Peripheral IV 08/01/24 Right;Ventral (anterior) Wrist 2 days                    Physical Exam:   GEN: NAD  HEENT: NCAT, MMM  CV: RRR, no m/r/g  Lung: Normal effort, CTA B/L, no w/r/r  Ab: Soft, NT/ND  Extrem: Right groin w/ induration without fluctuance, erythema surrounding incision, 2cm incisional wound with  drainage of purulent fluid thoroughly irrigated without further purulence, M/S intact, 2+ DP b/l  Neuro: A+Ox3     Lab, Imaging and other studies:I have personally reviewed pertinent lab results.     VTE Pharmacologic Prophylaxis: Enoxaparin (Lovenox)      Recent Results (from the past 36 hour(s))   Basic metabolic panel    Collection Time: 08/03/24  5:17 AM   Result Value Ref Range    Sodium 135 135 - 147 mmol/L    Potassium 3.8 3.5 - 5.3 mmol/L    Chloride 101 96 - 108 mmol/L    CO2 25 21 - 32 mmol/L    ANION GAP 9 4 - 13 mmol/L    BUN 22 5 - 25 mg/dL    Creatinine 0.96 0.60 - 1.30 mg/dL    Glucose 181 (H) 65 - 140 mg/dL    Calcium 8.4 8.4 - 10.2 mg/dL    eGFR 80 ml/min/1.73sq m   CBC and differential    Collection Time: 08/03/24  5:17 AM   Result Value Ref Range    WBC 11.07 (H) 4.31 - 10.16 Thousand/uL    RBC 3.35 (L) 3.88 - 5.62 Million/uL    Hemoglobin 10.3 (L) 12.0 - 17.0 g/dL    Hematocrit 30.6 (L) 36.5 - 49.3 %    MCV 91 82 - 98 fL    MCH 30.7 26.8 - 34.3 pg    MCHC 33.7 31.4 - 37.4 g/dL    RDW 14.5 11.6 - 15.1 %    MPV 9.3 8.9 - 12.7 fL    Platelets 308 149 - 390 Thousands/uL    nRBC 0 /100 WBCs    Segmented % 82 (H) 43 - 75 %    Immature Grans % 1 0 - 2 %    Lymphocytes % 6 (L) 14 - 44 %    Monocytes % 9 4 - 12 %    Eosinophils Relative 2 0 - 6 %    Basophils Relative 0 0 - 1 %    Absolute Neutrophils 9.03 (H) 1.85 - 7.62 Thousands/µL    Absolute Immature Grans 0.09 0.00 - 0.20 Thousand/uL    Absolute Lymphocytes 0.71 0.60 - 4.47 Thousands/µL    Absolute Monocytes 1.02 0.17 - 1.22 Thousand/µL    Eosinophils Absolute 0.19 0.00 - 0.61 Thousand/µL    Basophils Absolute 0.03 0.00 - 0.10 Thousands/µL   Fingerstick Glucose (POCT)    Collection Time: 08/03/24  6:13 AM   Result Value Ref Range    POC Glucose 184 (H) 65 - 140 mg/dl   Fingerstick Glucose (POCT)    Collection Time: 08/03/24 10:45 AM   Result Value Ref Range    POC Glucose 255 (H) 65 - 140 mg/dl   Fingerstick Glucose (POCT)    Collection Time:  08/03/24  3:30 PM   Result Value Ref Range    POC Glucose 256 (H) 65 - 140 mg/dl   Fingerstick Glucose (POCT)    Collection Time: 08/03/24  9:15 PM   Result Value Ref Range    POC Glucose 171 (H) 65 - 140 mg/dl   Fingerstick Glucose (POCT)    Collection Time: 08/04/24  6:50 AM   Result Value Ref Range    POC Glucose 182 (H) 65 - 140 mg/dl   Echo follow up/limited w/ contrast if indicated    Collection Time: 08/04/24  8:44 AM   Result Value Ref Range    BSA 2.36 m2    A4C EF 67 %    LVOT stroke volume 137.00     LVOT stroke volume index 58.10 ml/m2    LVOT Cardiac Output 7.98 l/min    LVOT Cardiac Index 3.38 l/min/m2    LVOT diameter 2.4 cm    LVOT peak VTI 29.68 cm    LA Volume Index (BP) 34.3 mL/m2    AV LVOT peak gradient 6 mmHg    LVOT peak earle 1.21 m/s    RVID d 4.4 cm    LA length (A2C) 6.70 cm    LA volume (BP) 81 mL    RAA A4C 19.3 cm2    Aortic valve peak velocity 2.69 m/s    Ao VTI 57.78 cm    AV mean gradient 16 mmHg    LVOT mn grad 3.0 mmHg    AV peak gradient 29 mmHg    AV area by cont VTI 2.3 cm2    AV area peak earle 2.0 cm2    TR Peak Earle 2.2 m/s    Triscuspid Valve Regurgitation Peak Gradient 19.0 mmHg    Aortic valve mean velocity 18.70 m/s    Tricuspid valve peak regurgitation velocity 2.20 m/s    Left Atrium Area-systolic Four Chamber 25.7 cm2    Left Atrium Area-systolic Apical Two Chamber 25.9 cm2

## 2024-08-05 ENCOUNTER — HOME HEALTH ADMISSION (OUTPATIENT)
Dept: HOME HEALTH SERVICES | Facility: HOME HEALTHCARE | Age: 69
End: 2024-08-05
Payer: COMMERCIAL

## 2024-08-05 LAB
ANION GAP SERPL CALCULATED.3IONS-SCNC: 11 MMOL/L (ref 4–13)
BASOPHILS # BLD AUTO: 0.05 THOUSANDS/ÂΜL (ref 0–0.1)
BASOPHILS NFR BLD AUTO: 1 % (ref 0–1)
BUN SERPL-MCNC: 19 MG/DL (ref 5–25)
CALCIUM SERPL-MCNC: 8.8 MG/DL (ref 8.4–10.2)
CHLORIDE SERPL-SCNC: 102 MMOL/L (ref 96–108)
CO2 SERPL-SCNC: 24 MMOL/L (ref 21–32)
CREAT SERPL-MCNC: 0.9 MG/DL (ref 0.6–1.3)
EOSINOPHIL # BLD AUTO: 0.29 THOUSAND/ÂΜL (ref 0–0.61)
EOSINOPHIL NFR BLD AUTO: 3 % (ref 0–6)
ERYTHROCYTE [DISTWIDTH] IN BLOOD BY AUTOMATED COUNT: 14.1 % (ref 11.6–15.1)
GFR SERPL CREATININE-BSD FRML MDRD: 87 ML/MIN/1.73SQ M
GLUCOSE SERPL-MCNC: 163 MG/DL (ref 65–140)
GLUCOSE SERPL-MCNC: 190 MG/DL (ref 65–140)
GLUCOSE SERPL-MCNC: 222 MG/DL (ref 65–140)
GLUCOSE SERPL-MCNC: 229 MG/DL (ref 65–140)
GLUCOSE SERPL-MCNC: 235 MG/DL (ref 65–140)
HCT VFR BLD AUTO: 35.7 % (ref 36.5–49.3)
HGB BLD-MCNC: 11.6 G/DL (ref 12–17)
IMM GRANULOCYTES # BLD AUTO: 0.09 THOUSAND/UL (ref 0–0.2)
IMM GRANULOCYTES NFR BLD AUTO: 1 % (ref 0–2)
LYMPHOCYTES # BLD AUTO: 0.86 THOUSANDS/ÂΜL (ref 0.6–4.47)
LYMPHOCYTES NFR BLD AUTO: 10 % (ref 14–44)
MCH RBC QN AUTO: 29.9 PG (ref 26.8–34.3)
MCHC RBC AUTO-ENTMCNC: 32.5 G/DL (ref 31.4–37.4)
MCV RBC AUTO: 92 FL (ref 82–98)
MONOCYTES # BLD AUTO: 0.57 THOUSAND/ÂΜL (ref 0.17–1.22)
MONOCYTES NFR BLD AUTO: 7 % (ref 4–12)
NEUTROPHILS # BLD AUTO: 6.57 THOUSANDS/ÂΜL (ref 1.85–7.62)
NEUTS SEG NFR BLD AUTO: 78 % (ref 43–75)
NRBC BLD AUTO-RTO: 0 /100 WBCS
PLATELET # BLD AUTO: 460 THOUSANDS/UL (ref 149–390)
PMV BLD AUTO: 9.3 FL (ref 8.9–12.7)
POTASSIUM SERPL-SCNC: 3.7 MMOL/L (ref 3.5–5.3)
RBC # BLD AUTO: 3.88 MILLION/UL (ref 3.88–5.62)
SODIUM SERPL-SCNC: 137 MMOL/L (ref 135–147)
WBC # BLD AUTO: 8.43 THOUSAND/UL (ref 4.31–10.16)

## 2024-08-05 PROCEDURE — 99232 SBSQ HOSP IP/OBS MODERATE 35: CPT | Performed by: INTERNAL MEDICINE

## 2024-08-05 PROCEDURE — 85025 COMPLETE CBC W/AUTO DIFF WBC: CPT | Performed by: INTERNAL MEDICINE

## 2024-08-05 PROCEDURE — 80048 BASIC METABOLIC PNL TOTAL CA: CPT | Performed by: INTERNAL MEDICINE

## 2024-08-05 PROCEDURE — 99233 SBSQ HOSP IP/OBS HIGH 50: CPT | Performed by: STUDENT IN AN ORGANIZED HEALTH CARE EDUCATION/TRAINING PROGRAM

## 2024-08-05 PROCEDURE — NC001 PR NO CHARGE: Performed by: SURGERY

## 2024-08-05 PROCEDURE — 82948 REAGENT STRIP/BLOOD GLUCOSE: CPT

## 2024-08-05 RX ORDER — CHLORHEXIDINE GLUCONATE ORAL RINSE 1.2 MG/ML
15 SOLUTION DENTAL ONCE
Status: DISCONTINUED | OUTPATIENT
Start: 2024-08-06 | End: 2024-08-06 | Stop reason: HOSPADM

## 2024-08-05 RX ORDER — CHLORHEXIDINE GLUCONATE ORAL RINSE 1.2 MG/ML
15 SOLUTION DENTAL ONCE
Status: DISCONTINUED | OUTPATIENT
Start: 2024-08-05 | End: 2024-08-05

## 2024-08-05 RX ORDER — ACETAMINOPHEN 325 MG/1
650 TABLET ORAL EVERY 6 HOURS PRN
Status: DISCONTINUED | OUTPATIENT
Start: 2024-08-05 | End: 2024-08-06

## 2024-08-05 RX ADMIN — CEFAZOLIN SODIUM 2000 MG: 2 SOLUTION INTRAVENOUS at 18:06

## 2024-08-05 RX ADMIN — INSULIN LISPRO 2 UNITS: 100 INJECTION, SOLUTION INTRAVENOUS; SUBCUTANEOUS at 08:02

## 2024-08-05 RX ADMIN — INSULIN LISPRO 4 UNITS: 100 INJECTION, SOLUTION INTRAVENOUS; SUBCUTANEOUS at 11:29

## 2024-08-05 RX ADMIN — ASPIRIN 81 MG: 81 TABLET, COATED ORAL at 09:04

## 2024-08-05 RX ADMIN — INSULIN LISPRO 4 UNITS: 100 INJECTION, SOLUTION INTRAVENOUS; SUBCUTANEOUS at 21:11

## 2024-08-05 RX ADMIN — ATORVASTATIN CALCIUM 40 MG: 40 TABLET, FILM COATED ORAL at 17:53

## 2024-08-05 RX ADMIN — AMLODIPINE BESYLATE 5 MG: 5 TABLET ORAL at 09:04

## 2024-08-05 RX ADMIN — INSULIN LISPRO 4 UNITS: 100 INJECTION, SOLUTION INTRAVENOUS; SUBCUTANEOUS at 17:53

## 2024-08-05 RX ADMIN — CEFAZOLIN SODIUM 2000 MG: 2 SOLUTION INTRAVENOUS at 01:54

## 2024-08-05 RX ADMIN — EZETIMIBE 10 MG: 10 TABLET ORAL at 09:04

## 2024-08-05 RX ADMIN — ENOXAPARIN SODIUM 40 MG: 40 INJECTION SUBCUTANEOUS at 09:04

## 2024-08-05 RX ADMIN — CEFAZOLIN SODIUM 2000 MG: 2 SOLUTION INTRAVENOUS at 09:05

## 2024-08-05 NOTE — ASSESSMENT & PLAN NOTE
Lab Results   Component Value Date    HGBA1C 8.0 (H) 07/26/2024       Recent Labs     08/04/24  2048 08/05/24  0645 08/05/24  1035 08/05/24  1540   POCGLU 235* 190* 229* 235*         Blood Sugar Average: Last 72 hrs:  (P) [id number] medications include pioglitazone metformin combination.  Hold home medication during hospitalization in light of elevated creatinine.  Monitor blood glucose, insulin per sliding scale [facility]

## 2024-08-05 NOTE — ASSESSMENT & PLAN NOTE
Patient with PMH of hypertension, diabetes, DVT, NSTEMI  who recently had Cardiac cath (Status postcardiac cath revealing no significant CAD to suggest NSTEMI per cardiology) done on 7/26 (6 days ago) presented with a 5-day history of fever, 1 day history of red, warm, tender lump in groin.           Lab Results   Component Value Date     WBC 13.38 (H) 08/02/2024     HGB 10.7 (L) 08/02/2024     HCT 32.2 (L) 08/02/2024     MCV 92 08/02/2024      08/02/2024      Plan  Monitor vitals  Continue cefepime(2g/50ml dextrose), vancomycin (2000mg)  Blood cultures positive for GPC is clusters, BCID is staph sedrick  Repeat blood cultures post op to confirm resolution of bacteremia  Follow up surgical culture results  Consult infectious disease  Patient antibiotics adjusted from cefepime vancomycin to cefazolin due to MSSA 2 out of 2 and blood cultures, echo ordered-negative for vegetations  Repeat culture negative-if still clear tomorrow can place picc per ID

## 2024-08-05 NOTE — PROGRESS NOTES
Progress Note - Infectious Disease   Juan Carlos Cleveland Jr. 68 y.o. male MRN: [id number]  Unit/Bed#: [facility] 524-01 Encounter: [id number]      Impression/Plan:    1.  Fever.  Due to #2/3 below.  Fever improved with antibiotics and status post right groin I&D.  The patient is hemodynamically stable.              -Continue IV cefazolin 2 g every 8 hours              -Follow-up blood cultures              -Monitor right groin wound, vascular surgery following              -Repeat CBC tomorrow to monitor treatment response              -Monitor fever curve     2. MSSA bacteremia.  Both sets admission blood cultures growing MSSA.  Due to infected right groin hematoma.  No intravascular prosthetic devices present. TTE no vegetations (adequate study)              -Cefazolin as above              -Follow-up blood cultures              -Anticipate at least a 4-week course of IV antibiotics   -Okay for PICC placement tomorrow if repeat blood cultures remain negative     3.  Infected right groin hematoma.  Following cardiac catheterization 7/26 with right femoral artery access.  Lower extremity arterial duplex showed a right groin hematoma, no pseudoaneurysm or AV fistula.  Status post right groin washout with vascular surgery 8/2/2024, tissue culture also growing MSSA.  Due to persistent purulence in the wound, vascular surgery planning for repeat I&D              -Antibiotics as above              -Vascular surgery follow-up ongoing              -Monitor right groin site     4.  Type 2 diabetes mellitus.  Hemoglobin A1c 8%.  Risk factor for infection.  Glycemic control per primary team.     I have discussed the above management plan to continue IV cefazolin with Dr. Alvarado who is in agreement.  Discussed with the patient at bedside.  ID will follow.    Antibiotics:  IV cefazolin  Abx day 5    Subjective:  The patient reports that he had significant drainage from his right groin incision when he stood up to go to the bathroom.   Pain in the area is improving.  He denies any fever or chills.    Objective:  Vitals:  Temp:  [98 °F (36.7 °C)-98.3 °F (36.8 °C)] 98.3 °F (36.8 °C)  HR:  [57-65] 60  Resp:  [14-17] 14  BP: (100-126)/(56-69) 126/62  SpO2:  [94 %-97 %] 95 %  Temp (24hrs), Av.2 °F (36.8 °C), Min:98 °F (36.7 °C), Max:98.3 °F (36.8 °C)  Current: Temperature: 98.3 °F (36.8 °C)    Physical Exam:   General Appearance:  Alert, interactive, nontoxic, no acute distress.   Throat: Oropharynx moist without lesions.    Lungs:   Clear to auscultation bilaterally; no wheezes, rhonchi or rales; respirations unlabored   Heart:  RRR; no murmur, rub or gallop   Abdomen:   Soft, non-tender, non-distended, positive bowel sounds.     Extremities: No clubbing, cyanosis or edema   Skin: Right groin wound with packing in place, surrounding erythema.  Dressing soaked with drainage       Labs:   All pertinent labs and imaging studies were personally reviewed  Results from last 7 days   Lab Units 24  0544 24  0517 24  0452   WBC Thousand/uL 8.43 11.07* 13.38*   HEMOGLOBIN g/dL 11.6* 10.3* 10.7*   PLATELETS Thousands/uL 460* 308 310     Results from last 7 days   Lab Units 24  0544 24  0517 24  0452   SODIUM mmol/L 137 135 134*   POTASSIUM mmol/L 3.7 3.8 3.9   CHLORIDE mmol/L 102 101 103   CO2 mmol/L 24 25 22   BUN mg/dL 19 22 30*   CREATININE mg/dL 0.90 0.96 1.11   EGFR ml/min/1.73sq m 87 80 67   CALCIUM mg/dL 8.8 8.4 8.6   AST U/L  --   --  23   ALT U/L  --   --  22   ALK PHOS U/L  --   --  100     Results from last 7 days   Lab Units 24  1026   PROCALCITONIN ng/ml 0.15                   Micro:  Results from last 7 days   Lab Units 24  1930 24  1916 24  1634 24  1015   BLOOD CULTURE  No Growth at 48 hrs. No Growth at 48 hrs.  --  Staphylococcus aureus*  Staphylococcus aureus*   GRAM STAIN RESULT   --   --  2+ Disintegrating polys*  2+ Gram positive cocci in clusters* Gram positive cocci in  clusters*  Gram positive cocci in clusters*       Imaging:  I have personally reviewed pertinent imaging study reports and images in PACS.  Lower extremity arterial duplex: Right groin hematoma, no evidence of pseudoaneurysm or arteriovenous fistula

## 2024-08-05 NOTE — UTILIZATION REVIEW
Initial Clinical Review    Admission: Date/Time/Statement:   Admission Orders (From admission, onward)       Ordered        08/01/24 1324  INPATIENT ADMISSION  Once                          Orders Placed This Encounter   Procedures    INPATIENT ADMISSION     Standing Status:   Standing     Number of Occurrences:   1     Order Specific Question:   Level of Care     Answer:   Med Surg [16]     Order Specific Question:   Estimated length of stay     Answer:   More than 2 Midnights     Order Specific Question:   Certification     Answer:   I certify that inpatient services are medically necessary for this patient for a duration of greater than two midnights. See H&P and MD Progress Notes for additional information about the patient's course of treatment.     ED Arrival Information       Expected   -    Arrival   8/1/2024 09:39    Acuity   Urgent              Means of arrival   Walk-In    Escorted by   Spouse    Service   Hospitalist    Admission type   Emergency              Arrival complaint   Catheter pain             Chief Complaint   Patient presents with    Wound Check     Had a cath procedure last Friday. Has been having intermittent fevers, highest 102F since then. Today comes in w/ c/o swelling, tenderness/redness in right femoral insertion site.        Initial Presentation: 68 y.o. male to ER from home.    PMH  HTN, DM, hx DVT, obesity, back pain, present w/5D  hx  fever, chills, night sweats and 1 day of red, warm, tender lump in right groin at vascular access site  (pt had Cardiac cath on 7/26,  via R femoral access w/ angioseal closure,  negative for significant coronary disease and was discharged 7/27:    began having fevers  7/28 )      EXAM:   R groin  - 5cm avascular collection, blanching erythema with induration, no active drainage or fluctuance. Palp fem, DP & PT all 2+. R thigh and leg compartments are soft, denies numbness or tingling. Wbc 9.52, Hb 12.5, Cr 1.32. VAS duplex shows widely patent common  femoral and external iliac arteries with no evidence of active pseudoaneurysm. There is also non-vascular structure at the groin measuring approximately 5.0 cm suggestive of hematoma. Concerns for infected groin hematoma.        8/01 8/02 8/01    ADMIT  INPT  STATUS to Internal Medicine,  MS  Level of care for  management of   infected groin hematoma s/p cardiac cath   Will consult Vascular surgery.  Keep npo for possible intervention;  provide IVF hydration,  IV pain control, IVABs, .  Serial exams access site.   Trend wbc and Hgb.  Cont IVABs.  Fup blood cx.  BS management w/accucks qid w/SSI.         8/01  VASCULAR SURGERY:     presentation concerning for R groin deep infection/abscess/infected hematoma without vascular abnormality; may involve closure device (angioseal)   recommend   Transfer to Higher Level of Care/ [facility]   for   I&D,  cont abx.     Date: 8/02      Day 2: 8/02    blood cx w/  GPC in clusters.       DC SUMMARY:    Continue broad-spectrum IV antibiotics as below. Patient denies uncontrolled pain at this time. Also denies fever/chills currently. Did spike high-grade fevers yesterday, along with developed leukocytosis today meeting SIRS criteria in the setting of a suspected groin infected hematoma. Mild creatinine elevation on presentation of 1.32, improved to 1.1 today with a typical baseline of 0.8-0.9. Continue to hold ACEI/HCTZ at this time. On Norvasc for essential hypertension. Continue SSI coverage per Accu-Cheks for history of diabetes mellitus. Remains on Zetia/statin for hyperlipidemia. Replete serum magnesium. Serum sodium: Low but stable at 134. Hemoglobin stable at 10.7 - in the setting of a suspected hematoma, monitor serially and transfuse if necessary.  Patient transfer to [facility] for incision and drainage of source     Vital Signs (last 3 days)       Date/Time Temp Pulse Resp BP MAP (mmHg) SpO2 [place] Coma Scale Score Pain    08/02/24 0727 99.8  °F (37.7 °C) -- -- -- -- -- 15 No Pain    08/02/24 0632 99.8 °F (37.7 °C) 81 18 121/57 82 91 % -- --    08/02/24 0017 99.8 °F (37.7 °C) -- -- -- -- -- -- No Pain    08/02/24 0006 100.2 °F (37.9 °C) 72 18 109/53 77 92 % -- --    08/01/24 1803 99.4 °F (37.4 °C) 66 18 103/54 77 -- 15 4    08/01/24 1712 98.5 °F (36.9 °C) 61 18 100/54 -- 96 % -- No Pain    08/01/24 1630 -- 67 18 103/53 76 97 % -- --    08/01/24 1600 -- 70 18 101/54 76 96 % -- --    08/01/24 1534 99.8 °F (37.7 °C) 69 18 99/52 -- 98 % -- --    08/01/24 1500 -- 78 20 110/55 79 97 % -- --    08/01/24 1410 103.1 °F (39.5 °C)  -- -- -- -- -- -- --    08/01/24 1400 -- 79 20 147/64 92 96 % -- --    08/01/24 1322 102.8 °F (39.3 °C)  -- -- -- -- -- -- --    08/01/24 1300 -- 74 20 117/71 89 97 % -- --    08/01/24 1230 100.1 °F (37.8 °C) -- -- -- -- -- -- --    08/01/24 1130 -- 76 20 117/55 79 93 % -- --    08/01/24 1004 98.4 °F (36.9 °C) 76 20 123/57 -- 98 % -- --              Pertinent Labs/Diagnostic Test Results:   Radiology:  VAS DUPLEX EVALUATION FOR PSEUDOANEURYSM   Final Interpretation by Jose Juan Jennings MD (08/01 1631)      XR chest pa & lateral   Final Interpretation by Татьяна Grey MD (08/01 1656)      No acute cardiopulmonary disease.            Workstation performed: [id number]             Results from last 7 days   Lab Units 08/01/24  1539   SARS-COV-2  Negative     Results from last 7 days   Lab Units 08/05/24  0544 08/03/24  0517 08/02/24  0452 08/01/24  1539 08/01/24  1026   WBC Thousand/uL 8.43 11.07* 13.38*  --  9.52   HEMOGLOBIN g/dL 11.6* 10.3* 10.7*  --  12.5   HEMATOCRIT % 35.7* 30.6* 32.2*  --  38.0   PLATELETS Thousands/uL 460* 308 310   < > 316   TOTAL NEUT ABS Thousands/µL 6.57 9.03* 11.52*  --  8.49*    < > = values in this interval not displayed.         Results from last 7 days   Lab Units 08/05/24  0544 08/03/24  0517 08/02/24  0452 08/01/24  1026   SODIUM mmol/L 137 135 134* 134*   POTASSIUM mmol/L 3.7 3.8 3.9 3.9    CHLORIDE mmol/L 102 101 103 99   CO2 mmol/L 24 25 22 22   ANION GAP mmol/L 11 9 9 13   BUN mg/dL 19 22 30* 32*   CREATININE mg/dL 0.90 0.96 1.11 1.32*   EGFR ml/min/1.73sq m 87 80 67 55   CALCIUM mg/dL 8.8 8.4 8.6 9.5   MAGNESIUM mg/dL  --   --  1.4*  --      Results from last 7 days   Lab Units 08/02/24  0452   AST U/L 23   ALT U/L 22   ALK PHOS U/L 100   TOTAL PROTEIN g/dL 6.2*   ALBUMIN g/dL 3.3*   TOTAL BILIRUBIN mg/dL 0.65     Results from last 7 days   Lab Units 08/05/24  0645 08/04/24  2048 08/04/24  1544 08/04/24  1134 08/04/24  0650 08/03/24  2115 08/03/24  1530 08/03/24  1045 08/03/24  0613 08/02/24  2053 08/02/24  1657 08/02/24  1105   POC GLUCOSE mg/dl 190* 235* 257* 197* 182* 171* 256* 255* 184* 192* 145* 182*     Results from last 7 days   Lab Units 08/05/24  0544 08/03/24  0517 08/02/24  0452 08/01/24  1026   GLUCOSE RANDOM mg/dL 163* 181* 162* 232*           Results from last 7 days   Lab Units 08/01/24  1026   PROCALCITONIN ng/ml 0.15     Results from last 7 days   Lab Units 08/01/24  1026   LACTIC ACID mmol/L 2.0                       Results from last 7 days   Lab Units 08/01/24  1539   INFLUENZA A PCR  Negative   INFLUENZA B PCR  Negative   RSV PCR  Negative       Results from last 7 days   Lab Units 08/01/24  1015   BLOOD CULTURE  Received in Microbiology Lab. Culture in Progress.   GRAM STAIN RESULT  Gram positive cocci in clusters*             Results from last 7 days   Lab Units 08/02/24  0452   VANCOMYCIN RM ug/mL 18.7       ED Treatment-Medication Administration from 08/01/2024 0939 to 08/01/2024 1730         Date/Time Order Dose Route Action     08/01/2024 1109 vancomycin (VANCOCIN) 2,000 mg in sodium chloride 0.9 % 500 mL IVPB 2,000 mg Intravenous New Bag     08/01/2024 1034 cefepime (MAXIPIME) 2 g/50 mL dextrose IVPB 2,000 mg Intravenous New Bag     08/01/2024 1034 lactated ringers bolus 1,000 mL 1,000 mL Intravenous New Bag     08/01/2024 1105 acetaminophen (TYLENOL) tablet 650 mg 650  mg Oral Given     08/01/2024 1334 ibuprofen (MOTRIN) tablet 400 mg 400 mg Oral Given     08/01/2024 1423 lactated ringers infusion 150 mL/hr Intravenous New Bag     08/01/2024 1600 aspirin (ECOTRIN LOW STRENGTH) EC tablet 81 mg 81 mg Oral Given     08/01/2024 1713 ezetimibe (ZETIA) tablet 10 mg 10 mg Oral Given     08/01/2024 1600 atorvastatin (LIPITOR) tablet 40 mg 40 mg Oral Given     08/01/2024 1558 lactated ringers infusion 125 mL/hr Intravenous New Bag     08/01/2024 1600 enoxaparin (LOVENOX) subcutaneous injection 40 mg 40 mg Subcutaneous Given     08/01/2024 1558 acetaminophen (Ofirmev) injection 1,000 mg 1,000 mg Intravenous New Bag            Past Medical History:   Diagnosis Date    Diabetes mellitus ([facility])     boaderline    DVT (deep venous thrombosis) ([facility])     History of blood clots right leg      june 23 2020    Hypertension     PONV (postoperative nausea and vomiting)     nausea after shoulder surgery     Venous stasis dermatitis of right lower extremity 06/24/2020     Present on Admission:   Elevated serum creatinine   Type 2 diabetes mellitus ([facility])   Hypertension      Admitting Diagnosis: Hematoma [T14.8XXA]  AHMET (acute kidney injury) ([facility]) [N17.9]  Cellulitis of groin, right [L03.314]  Visit for wound check [Z51.89]  Hematoma of groin, initial encounter [S30.1XXA]  Age/Sex: 68 y.o. male    Admission Orders:   see above note     Scheduled Medications:  No current facility-administered medications for this encounter.    Continuous IV Infusions:  No current facility-administered medications for this encounter.    PRN Meds:  acetaminophen, 1,000 mg, Intravenous, Q6H PRN  ....  8/01  @  2230        IP CONSULT TO VASCULAR SURGERY  IP CONSULT TO CARDIOLOGY    Network Utilization Review Department  ATTENTION: Please call with any questions or concerns to [phone] and carefully listen to the prompts so that you are directed to the right person. All voicemails are confidential.   For Discharge needs,  contact Care Management DC Support Team at [phone] opt. 2  Send all requests for admission clinical reviews, approved or denied determinations and any other requests to dedicated fax number below belonging to the campus where the patient is receiving treatment. List of dedicated fax numbers for the Facilities:  FACILITY NAME UR FAX NUMBER   ADMISSION DENIALS (Administrative/Medical Necessity) [phone]   DISCHARGE SUPPORT TEAM (NETWORK) [phone]   PARENT CHILD HEALTH (Maternity/NICU/Pediatrics) [phone]   [facility] [phone]   [facility] [phone]   [facility] [phone]   [facility] [phone]   [facility] [phone]   [facility] [phone]   [facility] [phone]   [facility] [phone]   [facility] [phone]   [facility] [phone]   [facility] [phone]   [facility] [phone]

## 2024-08-05 NOTE — CASE MANAGEMENT
Case Management Discharge Planning Note    Patient name Juan Carlos Cleveland Jr.  Location [facility] 524/[facility] 524-01 MRN [id number]  : 1955 Date 2024       Current Admission Date: 2024  Current Admission Diagnosis:Hematoma   Patient Active Problem List    Diagnosis Date Noted Date Diagnosed    Sepsis (HCC) 2024     Fever 2024     Hematoma 2024     Elevated serum creatinine 2024     Type 2 diabetes mellitus (HCC) 2024     Hypertension 2024     Class 1 obesity in adult 2024     Chronic bilateral low back pain without sciatica 2024     Cardiac murmur 2024     Other constipation 10/10/2023     BRBPR (bright red blood per rectum) 10/10/2023     Dysphagia 10/10/2023     Blood in stool 2020     History of DVT (deep vein thrombosis) 2020     Localized swelling, mass and lump, right lower limb 2020     Chest pain 2018       LOS (days): 3  Geometric Mean LOS (GMLOS) (days):   Days to GMLOS:     OBJECTIVE:  Risk of Unplanned Readmission Score: 11.83         Current admission status: Inpatient   Preferred Pharmacy:   [id number] - [place] - [address]  [address] [address]  Phone: [phone] Fax: [phone]    Primary Care Provider: Todd Nolasco DO    Primary Insurance: MEDICARE  Secondary Insurance: BLUE CROSS    DISCHARGE DETAILS:    Requested Home Health Care         Is the patient interested in HHC at discharge?: Yes  Home Health Discipline requested:: Nursing  Home Health Agency Name:: [facility]  Home Health Follow-Up Provider:: PCP  Home Health Services Needed:: Administration of IV, IM or SC Medications, Wound/Ostomy Care  Homebound Criteria Met:: Requires the Assistance of Another Person for Safe Ambulation or to Leave the Home, Uses an Assist Device (i.e. cane, walker, etc)  Supporting Clincal Findings:: Fatigues Easliy in Short Distances, Limited Endurance    Other  Referral/Resources/Interventions Provided:  Referral Comments: [facility] able to accept, likely will need wound care/ teaching and LT IV antibiotics

## 2024-08-05 NOTE — PROGRESS NOTES
Patient:    MRN:  [id number]    Aidin Request ID:  [id number]    Level of care reserved:  Home Health Agency    Partner Reserved:  [facility], [place] [address] [phone]    Clinical needs requested:    Geography searched:  [id number]    Start of Service:    Request sent:  10:33am EDT on 8/5/2024 by Whit Tatum    Partner reserved:  2:15pm EDT on 8/5/2024 by Whit Tatum    Choice list shared:  2:15pm EDT on 8/5/2024 by Whit Tatum

## 2024-08-05 NOTE — PROGRESS NOTES
Progress Note - Vascular Surgery   Juan Carlos Cleveland Jr. 68 y.o. male MRN: [id number]  Unit/Bed#: [facility] 524-01 Encounter: [id number]    Assessment:  Juan Carlos Cleveland Jr. is a 68 y.o. male w/ hx of HTN, DM, DVT, CAD s/p cardiac cath via R-fem access c/b right groin hematoma now presenting w/ bacteremia and right groin hematoma infection.       8/2 - Right groin incision, drainage, and exploration      Plan:  Right groin infection   Dressing/packing change with persistent purulence today 8/5, wound re-packed with 1/2 in packing strip  Continue with local wound care and daily dressing changes   Will need to return to OR for repeat I&D given persistent purulent drainage. Will look for OR time in the coming days  Bacteremia w/ MSSA, tissue cx w/ staph aureus   ID input appreciated   Continue Ancef   Continue aspirin and statin therapy   Glucose control   PRN pain control   Encourage ambulation  Incentive spirometry 10 times/hour while awake  Remainder of care per primary team      Subjective/Objective   No acute events overnight. Pain is well controlled. Has been ambulating without significant issue. Denies fever, chills, SOB, M/S changes.    Objective:     Blood pressure 123/69, pulse 57, temperature 98 °F (36.7 °C), temperature source Oral, resp. rate 17, height 6' (1.829 m), weight 115 kg (254 lb), SpO2 96%.,Body mass index is 34.45 kg/m².      Intake/Output Summary (Last 24 hours) at 8/5/2024 0656  Last data filed at 8/4/2024 1733  Gross per 24 hour   Intake 760 ml   Output 300 ml   Net 460 ml       Invasive Devices       Peripheral Intravenous Line  Duration             Peripheral IV 08/05/24 Left;Proximal;Ventral (anterior) Forearm <1 day                    Physical Exam:   GEN: NAD  HEENT: NCAT, MMM  CV: Regular rate, normotensive  Lung: Normal effort, saturating well  Ab: Soft, NT/ND  Extrem: Right groin w/ persistent erythema surrounding incision, 2cm incisional wound with drainage of purulent fluid. M/S intact,  2+ DP b/l  Neuro: A+Ox3     Recent Results (from the past 36 hour(s))   Fingerstick Glucose (POCT)    Collection Time: 08/03/24  9:15 PM   Result Value Ref Range    POC Glucose 171 (H) 65 - 140 mg/dl   Fingerstick Glucose (POCT)    Collection Time: 08/04/24  6:50 AM   Result Value Ref Range    POC Glucose 182 (H) 65 - 140 mg/dl   Echo follow up/limited w/ contrast if indicated    Collection Time: 08/04/24  8:44 AM   Result Value Ref Range    BSA 2.36 m2    A4C EF 67 %    LVOT stroke volume 134.20     LVOT stroke volume index 58.10 ml/m2    LVOT Cardiac Output 7.98 l/min    LVOT Cardiac Index 3.38 l/min/m2    LVOT diameter 2.4 cm    LVOT peak VTI 29.68 cm    LA Volume Index (BP) 34.3 mL/m2    AV LVOT peak gradient 6 mmHg    LVOT peak earle 1.21 m/s    RVID d 4.4 cm    LA length (A2C) 6.70 cm    LA volume (BP) 81 mL    RAA A4C 19.3 cm2    Aortic valve peak velocity 2.69 m/s    Ao VTI 57.78 cm    AV mean gradient 16 mmHg    LVOT mn grad 3.0 mmHg    AV peak gradient 29 mmHg    AV area by cont VTI 2.3 cm2    AV area peak earle 2.0 cm2    TR Peak Earle 2.2 m/s    Triscuspid Valve Regurgitation Peak Gradient 19.0 mmHg    Aortic valve mean velocity 18.70 m/s    Tricuspid valve peak regurgitation velocity 2.20 m/s    Left Atrium Area-systolic Four Chamber 25.7 cm2    Left Atrium Area-systolic Apical Two Chamber 25.9 cm2    LVOT area 4.52 cm2    DVI 0.45     AV valve area 2.32 cm2    LV EF 65    Fingerstick Glucose (POCT)    Collection Time: 08/04/24 11:34 AM   Result Value Ref Range    POC Glucose 197 (H) 65 - 140 mg/dl   Fingerstick Glucose (POCT)    Collection Time: 08/04/24  3:44 PM   Result Value Ref Range    POC Glucose 257 (H) 65 - 140 mg/dl   Fingerstick Glucose (POCT)    Collection Time: 08/04/24  8:48 PM   Result Value Ref Range    POC Glucose 235 (H) 65 - 140 mg/dl   CBC and differential    Collection Time: 08/05/24  5:44 AM   Result Value Ref Range    WBC 8.43 4.31 - 10.16 Thousand/uL    RBC 3.88 3.88 - 5.62 Million/uL     Hemoglobin 11.6 (L) 12.0 - 17.0 g/dL    Hematocrit 35.7 (L) 36.5 - 49.3 %    MCV 92 82 - 98 fL    MCH 29.9 26.8 - 34.3 pg    MCHC 32.5 31.4 - 37.4 g/dL    RDW 14.1 11.6 - 15.1 %    MPV 9.3 8.9 - 12.7 fL    Platelets 460 (H) 149 - 390 Thousands/uL    nRBC 0 /100 WBCs    Segmented % 78 (H) 43 - 75 %    Immature Grans % 1 0 - 2 %    Lymphocytes % 10 (L) 14 - 44 %    Monocytes % 7 4 - 12 %    Eosinophils Relative 3 0 - 6 %    Basophils Relative 1 0 - 1 %    Absolute Neutrophils 6.57 1.85 - 7.62 Thousands/µL    Absolute Immature Grans 0.09 0.00 - 0.20 Thousand/uL    Absolute Lymphocytes 0.86 0.60 - 4.47 Thousands/µL    Absolute Monocytes 0.57 0.17 - 1.22 Thousand/µL    Eosinophils Absolute 0.29 0.00 - 0.61 Thousand/µL    Basophils Absolute 0.05 0.00 - 0.10 Thousands/µL   Basic metabolic panel    Collection Time: 08/05/24  5:44 AM   Result Value Ref Range    Sodium 137 135 - 147 mmol/L    Potassium 3.7 3.5 - 5.3 mmol/L    Chloride 102 96 - 108 mmol/L    CO2 24 21 - 32 mmol/L    ANION GAP 11 4 - 13 mmol/L    BUN 19 5 - 25 mg/dL    Creatinine 0.90 0.60 - 1.30 mg/dL    Glucose 163 (H) 65 - 140 mg/dL    Calcium 8.8 8.4 - 10.2 mg/dL    eGFR 87 ml/min/1.73sq m   Fingerstick Glucose (POCT)    Collection Time: 08/05/24  6:45 AM   Result Value Ref Range    POC Glucose 190 (H) 65 - 140 mg/dl

## 2024-08-05 NOTE — CASE MANAGEMENT
Case Management Assessment & Discharge Planning Note    Patient name Juan Carlos Cleveland Jr.  Location [facility] 524/[facility] 524-01 MRN [id number]  : 1955 Date 2024       Current Admission Date: 2024  Current Admission Diagnosis:Hematoma   Patient Active Problem List    Diagnosis Date Noted Date Diagnosed    Sepsis (HCC) 2024     Fever 2024     Hematoma 2024     Elevated serum creatinine 2024     Type 2 diabetes mellitus (HCC) 2024     Hypertension 2024     Class 1 obesity in adult 2024     Chronic bilateral low back pain without sciatica 2024     Cardiac murmur 2024     Other constipation 10/10/2023     BRBPR (bright red blood per rectum) 10/10/2023     Dysphagia 10/10/2023     Blood in stool 2020     History of DVT (deep vein thrombosis) 2020     Localized swelling, mass and lump, right lower limb 2020     Chest pain 2018       LOS (days): 3  Geometric Mean LOS (GMLOS) (days):   Days to GMLOS:     OBJECTIVE:  PATIENT READMITTED TO HOSPITAL  Risk of Unplanned Readmission Score: 11.8         Current admission status: Inpatient       Preferred Pharmacy:   [id number] - [place] - [address]  [address] [address]  Phone: [phone] Fax: [phone]    Primary Care Provider: Todd Nolasco DO    Primary Insurance: MEDICARE  Secondary Insurance: BLUE CROSS    ASSESSMENT:  Active Health Care Proxies       Bassam Cleveland Health Care Representative - Spouse   Primary Phone: [phone] (Home)                 Readmission Root Cause  30 Day Readmission: Yes  Who directed you to return to the hospital?: Self  Did you understand whom to contact if you had questions or problems?: Yes  Did you get your prescriptions before you left the hospital?: Yes  Were you able to get your prescriptions filled when you left the hospital?: Yes  Did you take your medications as prescribed?: Yes  Were you able to get to  your follow-up appointments?: No  Reason:: Other (comment) (readmitted prior to MD appointments)  During previous admission, was a post-acute recommendation made?: No  Patient was readmitted due to: sepsis, hematoma  Action Plan: likely home with  for wound care    Patient Information  Admitted from:: Home  Mental Status: Alert  During Assessment patient was accompanied by: Not accompanied during assessment  Assessment information provided by:: Patient  Primary Caregiver: Self  Support Systems: Self, Spouse/significant other  County of Residence: [place]  What city do you live in?: [place]  Home entry access options. Select all that apply.: Stairs  Number of steps to enter home.: 2  Upon entering residence, is there a bedroom on the main floor (no further steps)?: No  A bedroom is located on the following floor levels of residence (select all that apply):: 2nd Floor  Upon entering residence, is there a bathroom on the main floor (no further steps)?: No  Indicate which floors of current residence have a bathroom (select all the apply):: 2nd Floor  Number of steps to 2nd floor from main floor: 5  Living Arrangements: Lives w/ Spouse/significant other  Is patient a ?: No    Activities of Daily Living Prior to Admission  Functional Status: Independent  Completes ADLs independently?: Yes  Ambulates independently?: Yes  Does patient use assisted devices?: No  Does patient currently own DME?: Yes  What DME does the patient currently own?: Bedside Commode, Walker, Straight Cane  Does patient have a history of Outpatient Therapy (PT/OT)?: Yes (for shoulder surgery)  Does the patient have a history of Short-Term Rehab?: No  Does patient have a history of HHC?: No  Does patient currently have HHC?: No    Patient Information Continued  Income Source: Employed  Does patient have prescription coverage?: Yes  Does patient receive dialysis treatments?: No  Does patient have a history of substance abuse?: No  Does  patient have a history of Mental Health Diagnosis?: No    Means of Transportation  Means of Transport to Appts:: Drives Self      Social Determinants of Health (SDOH)      Flowsheet Row Most Recent Value   Housing Stability    In the last 12 months, was there a time when you were not able to pay the mortgage or rent on time? N   In the past 12 months, how many times have you moved where you were living? 0   At any time in the past 12 months, were you homeless or living in a shelter (including now)? N   Transportation Needs    In the past 12 months, has lack of transportation kept you from medical appointments or from getting medications? no   In the past 12 months, has lack of transportation kept you from meetings, work, or from getting things needed for daily living? No   Food Insecurity    Within the past 12 months, you worried that your food would run out before you got the money to buy more. Never true   Within the past 12 months, the food you bought just didn't last and you didn't have money to get more. Never true   Utilities    In the past 12 months has the electric, gas, oil, or water company threatened to shut off services in your home? No            DISCHARGE DETAILS:    Discharge planning discussed with:: Patient  Freedom of Choice: Yes  Comments - Freedom of Choice: Discussed CHRISTIAN  CM contacted family/caregiver?: No- see comments (Declined)     Other Referral/Resources/Interventions Provided:  Referral Comments: Patient likely may need HH on DC, referral placed to  [facility] for potential wound care/teaching in the home, entered in [facility]    CM reviewed d/c planning process including the following: identifying help at home, patient preference for d/c planning needs, Discharge Lounge, [facility] Meds to Bed program, availability of treatment team to discuss questions or concerns patient and/or family may have regarding understanding medications and recognizing signs and symptoms once discharged.  CM also  encouraged patient to follow up with all recommended appointments after discharge. Patient advised of importance for patient and family to participate in managing patient’s medical well being.    This CM introduced self and role, patient is a 30 day readmission due to new hematoma.  IADLS, has access to cane, walker, and commode at home (does not use).  Drives self, is employed

## 2024-08-05 NOTE — ASSESSMENT & PLAN NOTE
Patient with a history of hypertension, diabetes, DVT and NSTEMI who recently had cardiac catheterization (7/26) 6 days ago presents with right groin swelling, fevers, decreased appetite and headaches.   Transferred to [facility] for a vascular surgery consultation.  S/p I and D in the OR  Follow up cultures  .  Vancomycin adjusted to cefazolin due to MSSA blood culture positive  Patient pending repeat I/d due to ongoing significant purulence

## 2024-08-05 NOTE — PROGRESS NOTES
[facility]  Progress Note  Name: Juan Carlos Cunnnigham I  MRN: [id number]  Unit/Bed#: [facility] 524-01 I Date of Admission: 8/2/2024   Date of Service: 8/5/2024 I Hospital Day: 3    Assessment & Plan   Sepsis (HCC)  Assessment & Plan  Patient with PMH of hypertension, diabetes, DVT, NSTEMI  who recently had Cardiac cath (Status postcardiac cath revealing no significant CAD to suggest NSTEMI per cardiology) done on 7/26 (6 days ago) presented with a 5-day history of fever, 1 day history of red, warm, tender lump in groin.           Lab Results   Component Value Date     WBC 13.38 (H) 08/02/2024     HGB 10.7 (L) 08/02/2024     HCT 32.2 (L) 08/02/2024     MCV 92 08/02/2024      08/02/2024      Plan  Monitor vitals  Continue cefepime(2g/50ml dextrose), vancomycin (2000mg)  Blood cultures positive for GPC is clusters, BCID is staph sedrick  Repeat blood cultures post op to confirm resolution of bacteremia  Follow up surgical culture results  Consult infectious disease  Patient antibiotics adjusted from cefepime vancomycin to cefazolin due to MSSA 2 out of 2 and blood cultures, echo ordered-negative for vegetations  Repeat culture negative-if still clear tomorrow can place picc per ID    Class 1 obesity in adult  Assessment & Plan  Affects all aspects of his care  Turn q2h  Weight loss counseling when stable as an outpatient    Hypertension  Assessment & Plan  On home medication of lisinopril hydrochlorothiazide.  Blood pressure is well-controlled at this time.  Given elevated creatinine, hold off on lisinopril hydrochlorothiazide.  In the interim, start amlodipine 5 mg daily.  Monitor vitals, adjust dosing as needed.    Type 2 diabetes mellitus (HCC)  Assessment & Plan  Lab Results   Component Value Date    HGBA1C 8.0 (H) 07/26/2024       Recent Labs     08/04/24  2048 08/05/24  0645 08/05/24  1035 08/05/24  1540   POCGLU 235* 190* 229* 235*         Blood Sugar Average: Last 72  hrs:  (P) [id number] medications include pioglitazone metformin combination.  Hold home medication during hospitalization in light of elevated creatinine.  Monitor blood glucose, insulin per sliding scale [facility]      * Hematoma  Assessment & Plan  Patient with a history of hypertension, diabetes, DVT and NSTEMI who recently had cardiac catheterization (7/26) 6 days ago presents with right groin swelling, fevers, decreased appetite and headaches.   Transferred to [facility] for a vascular surgery consultation.  S/p I and D in the OR  Follow up cultures  .  Vancomycin adjusted to cefazolin due to MSSA blood culture positive  Patient pending repeat I/d due to ongoing significant purulence               VTE Pharmacologic Prophylaxis:   High Risk (Score >/= 5) - Pharmacological DVT Prophylaxis Ordered: enoxaparin (Lovenox). Sequential Compression Devices Ordered.    Mobility:   Basic Mobility Inpatient Raw Score: 24  JH-HLM Goal: 8: Walk 250 feet or more  JH-HLM Achieved: 8: Walk 250 feet ot more  JH-HLM Goal achieved. Continue to encourage appropriate mobility.    Patient Centered Rounds: I performed bedside rounds with nursing staff today.   Discussions with Specialists or Other Care Team Provider:     Education and Discussions with Family / Patient: Patient declined call to .     Total Time Spent on Date of Encounter in care of patient:  mins. This time was spent on one or more of the following: performing physical exam; counseling and coordination of care; obtaining or reviewing history; documenting in the medical record; reviewing/ordering tests, medications or procedures; communicating with other healthcare professionals and discussing with patient's family/caregivers.    Current Length of Stay: 3 day(s)  Current Patient Status: Inpatient   Certification Statement: The patient will continue to require additional inpatient hospital stay due to procedure  Discharge Plan: Anticipate  discharge in 48-72 hrs to home with home services.    Code Status: Level 1 - Full Code    Subjective:   Patient reports drainage from right groin site otherwise denies acute complaints incudling fevers chlls nausea vomiting diarrhea abdominal pain, tolerating abx    Objective:     Vitals:   Temp (24hrs), Av.2 °F (36.8 °C), Min:98 °F (36.7 °C), Max:98.4 °F (36.9 °C)    Temp:  [98 °F (36.7 °C)-98.4 °F (36.9 °C)] 98.4 °F (36.9 °C)  HR:  [57-62] 57  Resp:  [14-17] 16  BP: (120-146)/(57-75) 146/75  SpO2:  [94 %-98 %] 98 %  Body mass index is 34.45 kg/m².     Input and Output Summary (last 24 hours):     Intake/Output Summary (Last 24 hours) at 2024 1547  Last data filed at 2024 1405  Gross per 24 hour   Intake 1120 ml   Output --   Net 1120 ml       Physical Exam:   Physical Exam  Vitals and nursing note reviewed.   Constitutional:       General: He is not in acute distress.     Appearance: He is well-developed. He is not toxic-appearing or diaphoretic.   HENT:      Head: Normocephalic and atraumatic.   Eyes:      General: No scleral icterus.     Conjunctiva/sclera: Conjunctivae normal.   Cardiovascular:      Rate and Rhythm: Normal rate and regular rhythm.      Heart sounds: No murmur heard.     No friction rub. No gallop.   Pulmonary:      Effort: Pulmonary effort is normal. No respiratory distress.      Breath sounds: Normal breath sounds. No stridor. No wheezing, rhonchi or rales.   Chest:      Chest wall: No tenderness.   Abdominal:      General: There is no distension.      Palpations: Abdomen is soft. There is no mass.      Tenderness: There is no abdominal tenderness. There is no guarding or rebound.      Hernia: No hernia is present.   Musculoskeletal:         General: No swelling or tenderness.      Cervical back: Neck supple.      Comments: Right groin dressing c/d/i   Skin:     General: Skin is warm and dry.      Capillary Refill: Capillary refill takes less than 2 seconds.   Neurological:       Mental Status: He is alert and oriented to person, place, and time.   Psychiatric:         Mood and Affect: Mood normal.          Additional Data:     Labs:  Results from last 7 days   Lab Units 08/05/24  0544   WBC Thousand/uL 8.43   HEMOGLOBIN g/dL 11.6*   HEMATOCRIT % 35.7*   PLATELETS Thousands/uL 460*   SEGS PCT % 78*   LYMPHO PCT % 10*   MONO PCT % 7   EOS PCT % 3     Results from last 7 days   Lab Units 08/05/24  0544 08/03/24  0517 08/02/24  0452   SODIUM mmol/L 137   < > 134*   POTASSIUM mmol/L 3.7   < > 3.9   CHLORIDE mmol/L 102   < > 103   CO2 mmol/L 24   < > 22   BUN mg/dL 19   < > 30*   CREATININE mg/dL 0.90   < > 1.11   ANION GAP mmol/L 11   < > 9   CALCIUM mg/dL 8.8   < > 8.6   ALBUMIN g/dL  --   --  3.3*   TOTAL BILIRUBIN mg/dL  --   --  0.65   ALK PHOS U/L  --   --  100   ALT U/L  --   --  22   AST U/L  --   --  23   GLUCOSE RANDOM mg/dL 163*   < > 162*    < > = values in this interval not displayed.         Results from last 7 days   Lab Units 08/05/24  1540 08/05/24  1035 08/05/24  0645 08/04/24  2048 08/04/24  1544 08/04/24  1134 08/04/24  0650 08/03/24  2115 08/03/24  1530 08/03/24  1045 08/03/24  0613 08/02/24  2053   POC GLUCOSE mg/dl 235* 229* 190* 235* 257* 197* 182* 171* 256* 255* 184* 192*         Results from last 7 days   Lab Units 08/01/24  1026   LACTIC ACID mmol/L 2.0   PROCALCITONIN ng/ml 0.15       Lines/Drains:  Invasive Devices       Peripheral Intravenous Line  Duration             Peripheral IV 08/05/24 Left;Proximal;Ventral (anterior) Forearm <1 day                          Imaging: No pertinent imaging reviewed.    Recent Cultures (last 7 days):   Results from last 7 days   Lab Units 08/02/24  1930 08/02/24  1916 08/02/24  1634 08/01/24  1015   BLOOD CULTURE  No Growth at 48 hrs. No Growth at 48 hrs.  --  Staphylococcus aureus*  Staphylococcus aureus*   GRAM STAIN RESULT   --   --  2+ Disintegrating polys*  2+ Gram positive cocci in clusters* Gram positive cocci in clusters*   Gram positive cocci in clusters*       Last 24 Hours Medication List:   Current Facility-Administered Medications   Medication Dose Route Frequency Provider Last Rate    acetaminophen  650 mg Oral Q6H PRN Su Dey PA-C      amLODIPine  5 mg Oral Daily Jagdish Flynn MD      aspirin  81 mg Oral Daily Jagdish Flynn MD      atorvastatin  40 mg Oral Daily With Dinner Jagdish Flynn MD      cefazolin  2,000 mg Intravenous Q8H Velma Carpenter MD 2,000 mg (08/05/24 0905)    [START ON 8/6/2024] chlorhexidine  15 mL Swish & Spit Once Su Dey PA-C      enoxaparin  40 mg Subcutaneous Daily Jagdish Flynn MD      ezetimibe  10 mg Oral Daily Jagdish Flynn MD      insulin lispro  2-12 Units Subcutaneous 4x Daily (AC & HS) Jagdish Flynn MD          Today, Patient Was Seen By: Manfred Alvarado DO    **Please Note: This note may have been constructed using a voice recognition system.**

## 2024-08-06 ENCOUNTER — ANESTHESIA EVENT (INPATIENT)
Dept: PERIOP | Facility: HOSPITAL | Age: 69
DRG: 857 | End: 2024-08-06
Payer: COMMERCIAL

## 2024-08-06 ENCOUNTER — ANESTHESIA (INPATIENT)
Dept: PERIOP | Facility: HOSPITAL | Age: 69
DRG: 857 | End: 2024-08-06
Payer: COMMERCIAL

## 2024-08-06 LAB
ABO GROUP BLD: NORMAL
ANION GAP SERPL CALCULATED.3IONS-SCNC: 8 MMOL/L (ref 4–13)
BLD GP AB SCN SERPL QL: NEGATIVE
BUN SERPL-MCNC: 18 MG/DL (ref 5–25)
CALCIUM SERPL-MCNC: 8.6 MG/DL (ref 8.4–10.2)
CHLORIDE SERPL-SCNC: 104 MMOL/L (ref 96–108)
CO2 SERPL-SCNC: 26 MMOL/L (ref 21–32)
CREAT SERPL-MCNC: 0.72 MG/DL (ref 0.6–1.3)
ERYTHROCYTE [DISTWIDTH] IN BLOOD BY AUTOMATED COUNT: 14 % (ref 11.6–15.1)
GFR SERPL CREATININE-BSD FRML MDRD: 95 ML/MIN/1.73SQ M
GLUCOSE SERPL-MCNC: 152 MG/DL (ref 65–140)
GLUCOSE SERPL-MCNC: 163 MG/DL (ref 65–140)
GLUCOSE SERPL-MCNC: 173 MG/DL (ref 65–140)
GLUCOSE SERPL-MCNC: 184 MG/DL (ref 65–140)
GLUCOSE SERPL-MCNC: 203 MG/DL (ref 65–140)
GLUCOSE SERPL-MCNC: 245 MG/DL (ref 65–140)
HCT VFR BLD AUTO: 31.8 % (ref 36.5–49.3)
HGB BLD-MCNC: 10.5 G/DL (ref 12–17)
MCH RBC QN AUTO: 29.8 PG (ref 26.8–34.3)
MCHC RBC AUTO-ENTMCNC: 33 G/DL (ref 31.4–37.4)
MCV RBC AUTO: 90 FL (ref 82–98)
PLATELET # BLD AUTO: 464 THOUSANDS/UL (ref 149–390)
PMV BLD AUTO: 9 FL (ref 8.9–12.7)
POTASSIUM SERPL-SCNC: 3.7 MMOL/L (ref 3.5–5.3)
RBC # BLD AUTO: 3.52 MILLION/UL (ref 3.88–5.62)
RH BLD: POSITIVE
SODIUM SERPL-SCNC: 138 MMOL/L (ref 135–147)
SPECIMEN EXPIRATION DATE: NORMAL
WBC # BLD AUTO: 7.65 THOUSAND/UL (ref 4.31–10.16)

## 2024-08-06 PROCEDURE — 86850 RBC ANTIBODY SCREEN: CPT | Performed by: PHYSICIAN ASSISTANT

## 2024-08-06 PROCEDURE — 0JBL0ZZ EXCISION OF RIGHT UPPER LEG SUBCUTANEOUS TISSUE AND FASCIA, OPEN APPROACH: ICD-10-PCS | Performed by: SURGERY

## 2024-08-06 PROCEDURE — 86900 BLOOD TYPING SEROLOGIC ABO: CPT | Performed by: PHYSICIAN ASSISTANT

## 2024-08-06 PROCEDURE — 99024 POSTOP FOLLOW-UP VISIT: CPT | Performed by: STUDENT IN AN ORGANIZED HEALTH CARE EDUCATION/TRAINING PROGRAM

## 2024-08-06 PROCEDURE — 11042 DBRDMT SUBQ TIS 1ST 20SQCM/<: CPT | Performed by: SURGERY

## 2024-08-06 PROCEDURE — 97605 NEG PRS WND THER DME<=50SQCM: CPT | Performed by: SURGERY

## 2024-08-06 PROCEDURE — 82948 REAGENT STRIP/BLOOD GLUCOSE: CPT

## 2024-08-06 PROCEDURE — 86901 BLOOD TYPING SEROLOGIC RH(D): CPT | Performed by: PHYSICIAN ASSISTANT

## 2024-08-06 PROCEDURE — 99232 SBSQ HOSP IP/OBS MODERATE 35: CPT | Performed by: STUDENT IN AN ORGANIZED HEALTH CARE EDUCATION/TRAINING PROGRAM

## 2024-08-06 PROCEDURE — 85027 COMPLETE CBC AUTOMATED: CPT | Performed by: PHYSICIAN ASSISTANT

## 2024-08-06 PROCEDURE — 04BK0ZZ EXCISION OF RIGHT FEMORAL ARTERY, OPEN APPROACH: ICD-10-PCS | Performed by: SURGERY

## 2024-08-06 PROCEDURE — 80048 BASIC METABOLIC PNL TOTAL CA: CPT | Performed by: PHYSICIAN ASSISTANT

## 2024-08-06 PROCEDURE — 11045 DBRDMT SUBQ TISS EACH ADDL: CPT | Performed by: SURGERY

## 2024-08-06 RX ORDER — HYDROMORPHONE HCL/PF 1 MG/ML
SYRINGE (ML) INJECTION AS NEEDED
Status: DISCONTINUED | OUTPATIENT
Start: 2024-08-06 | End: 2024-08-06

## 2024-08-06 RX ORDER — ONDANSETRON 2 MG/ML
4 INJECTION INTRAMUSCULAR; INTRAVENOUS ONCE AS NEEDED
Status: COMPLETED | OUTPATIENT
Start: 2024-08-06 | End: 2024-08-06

## 2024-08-06 RX ORDER — CEFAZOLIN SODIUM 2 G/50ML
2000 SOLUTION INTRAVENOUS ONCE
Status: DISCONTINUED | OUTPATIENT
Start: 2024-08-06 | End: 2024-08-06 | Stop reason: HOSPADM

## 2024-08-06 RX ORDER — ONDANSETRON 2 MG/ML
INJECTION INTRAMUSCULAR; INTRAVENOUS AS NEEDED
Status: DISCONTINUED | OUTPATIENT
Start: 2024-08-06 | End: 2024-08-06

## 2024-08-06 RX ORDER — KETAMINE HCL IN NACL, ISO-OSM 100MG/10ML
SYRINGE (ML) INJECTION AS NEEDED
Status: DISCONTINUED | OUTPATIENT
Start: 2024-08-06 | End: 2024-08-06

## 2024-08-06 RX ORDER — SODIUM CHLORIDE, SODIUM LACTATE, POTASSIUM CHLORIDE, CALCIUM CHLORIDE 600; 310; 30; 20 MG/100ML; MG/100ML; MG/100ML; MG/100ML
INJECTION, SOLUTION INTRAVENOUS CONTINUOUS PRN
Status: DISCONTINUED | OUTPATIENT
Start: 2024-08-06 | End: 2024-08-06

## 2024-08-06 RX ORDER — HEPARIN SODIUM 1000 [USP'U]/ML
INJECTION, SOLUTION INTRAVENOUS; SUBCUTANEOUS AS NEEDED
Status: DISCONTINUED | OUTPATIENT
Start: 2024-08-06 | End: 2024-08-06

## 2024-08-06 RX ORDER — MAGNESIUM HYDROXIDE 1200 MG/15ML
LIQUID ORAL AS NEEDED
Status: DISCONTINUED | OUTPATIENT
Start: 2024-08-06 | End: 2024-08-06 | Stop reason: HOSPADM

## 2024-08-06 RX ORDER — SODIUM CHLORIDE, SODIUM LACTATE, POTASSIUM CHLORIDE, CALCIUM CHLORIDE 600; 310; 30; 20 MG/100ML; MG/100ML; MG/100ML; MG/100ML
50 INJECTION, SOLUTION INTRAVENOUS CONTINUOUS
Status: DISCONTINUED | OUTPATIENT
Start: 2024-08-06 | End: 2024-08-07

## 2024-08-06 RX ORDER — FENTANYL CITRATE 50 UG/ML
INJECTION, SOLUTION INTRAMUSCULAR; INTRAVENOUS AS NEEDED
Status: DISCONTINUED | OUTPATIENT
Start: 2024-08-06 | End: 2024-08-06

## 2024-08-06 RX ORDER — ACETAMINOPHEN 10 MG/ML
1000 INJECTION, SOLUTION INTRAVENOUS ONCE
Status: COMPLETED | OUTPATIENT
Start: 2024-08-06 | End: 2024-08-07

## 2024-08-06 RX ORDER — HYDROMORPHONE HCL IN WATER/PF 6 MG/30 ML
0.2 PATIENT CONTROLLED ANALGESIA SYRINGE INTRAVENOUS
Status: COMPLETED | OUTPATIENT
Start: 2024-08-06 | End: 2024-08-06

## 2024-08-06 RX ORDER — LIDOCAINE HYDROCHLORIDE 10 MG/ML
INJECTION, SOLUTION EPIDURAL; INFILTRATION; INTRACAUDAL; PERINEURAL AS NEEDED
Status: DISCONTINUED | OUTPATIENT
Start: 2024-08-06 | End: 2024-08-06

## 2024-08-06 RX ORDER — HEPARIN SODIUM 200 [USP'U]/100ML
INJECTION, SOLUTION INTRAVENOUS
Status: COMPLETED | OUTPATIENT
Start: 2024-08-06 | End: 2024-08-06

## 2024-08-06 RX ORDER — FENTANYL CITRATE/PF 50 MCG/ML
50 SYRINGE (ML) INJECTION
Status: DISCONTINUED | OUTPATIENT
Start: 2024-08-06 | End: 2024-08-06 | Stop reason: HOSPADM

## 2024-08-06 RX ORDER — SODIUM CHLORIDE, SODIUM LACTATE, POTASSIUM CHLORIDE, CALCIUM CHLORIDE 600; 310; 30; 20 MG/100ML; MG/100ML; MG/100ML; MG/100ML
100 INJECTION, SOLUTION INTRAVENOUS CONTINUOUS
Status: DISCONTINUED | OUTPATIENT
Start: 2024-08-06 | End: 2024-08-07

## 2024-08-06 RX ORDER — HYDROMORPHONE HCL IN WATER/PF 6 MG/30 ML
0.2 PATIENT CONTROLLED ANALGESIA SYRINGE INTRAVENOUS
Status: DISCONTINUED | OUTPATIENT
Start: 2024-08-06 | End: 2024-08-06

## 2024-08-06 RX ORDER — PROPOFOL 10 MG/ML
INJECTION, EMULSION INTRAVENOUS AS NEEDED
Status: DISCONTINUED | OUTPATIENT
Start: 2024-08-06 | End: 2024-08-06

## 2024-08-06 RX ORDER — DEXAMETHASONE SODIUM PHOSPHATE 10 MG/ML
INJECTION, SOLUTION INTRAMUSCULAR; INTRAVENOUS AS NEEDED
Status: DISCONTINUED | OUTPATIENT
Start: 2024-08-06 | End: 2024-08-06

## 2024-08-06 RX ORDER — ONDANSETRON 2 MG/ML
4 INJECTION INTRAMUSCULAR; INTRAVENOUS EVERY 4 HOURS PRN
Status: DISCONTINUED | OUTPATIENT
Start: 2024-08-06 | End: 2024-08-09 | Stop reason: HOSPADM

## 2024-08-06 RX ORDER — FENTANYL CITRATE/PF 50 MCG/ML
25 SYRINGE (ML) INJECTION
Status: DISCONTINUED | OUTPATIENT
Start: 2024-08-06 | End: 2024-08-06 | Stop reason: HOSPADM

## 2024-08-06 RX ORDER — ALBUTEROL SULFATE 2.5 MG/3ML
2.5 SOLUTION RESPIRATORY (INHALATION) ONCE AS NEEDED
Status: DISCONTINUED | OUTPATIENT
Start: 2024-08-06 | End: 2024-08-06 | Stop reason: HOSPADM

## 2024-08-06 RX ORDER — ACETAMINOPHEN 325 MG/1
975 TABLET ORAL EVERY 8 HOURS SCHEDULED
Status: DISCONTINUED | OUTPATIENT
Start: 2024-08-06 | End: 2024-08-09 | Stop reason: HOSPADM

## 2024-08-06 RX ORDER — PHENYLEPHRINE HCL IN 0.9% NACL 1 MG/10 ML
SYRINGE (ML) INTRAVENOUS AS NEEDED
Status: DISCONTINUED | OUTPATIENT
Start: 2024-08-06 | End: 2024-08-06

## 2024-08-06 RX ORDER — METHOCARBAMOL 100 MG/ML
750 INJECTION, SOLUTION INTRAMUSCULAR; INTRAVENOUS ONCE
Status: COMPLETED | OUTPATIENT
Start: 2024-08-06 | End: 2024-08-06

## 2024-08-06 RX ORDER — TRAMADOL HYDROCHLORIDE 50 MG/1
50 TABLET ORAL EVERY 6 HOURS PRN
Status: DISCONTINUED | OUTPATIENT
Start: 2024-08-06 | End: 2024-08-09 | Stop reason: HOSPADM

## 2024-08-06 RX ORDER — OXYCODONE HYDROCHLORIDE 5 MG/1
5 TABLET ORAL EVERY 4 HOURS PRN
Status: DISCONTINUED | OUTPATIENT
Start: 2024-08-06 | End: 2024-08-09 | Stop reason: HOSPADM

## 2024-08-06 RX ADMIN — CEFAZOLIN SODIUM 2000 MG: 2 SOLUTION INTRAVENOUS at 12:16

## 2024-08-06 RX ADMIN — HYDROMORPHONE HYDROCHLORIDE 0.5 MG: 1 INJECTION, SOLUTION INTRAMUSCULAR; INTRAVENOUS; SUBCUTANEOUS at 13:09

## 2024-08-06 RX ADMIN — SODIUM CHLORIDE, SODIUM LACTATE, POTASSIUM CHLORIDE, AND CALCIUM CHLORIDE: .6; .31; .03; .02 INJECTION, SOLUTION INTRAVENOUS at 12:09

## 2024-08-06 RX ADMIN — ONDANSETRON 4 MG: 2 INJECTION INTRAMUSCULAR; INTRAVENOUS at 15:58

## 2024-08-06 RX ADMIN — CEFAZOLIN SODIUM 2000 MG: 2 SOLUTION INTRAVENOUS at 17:54

## 2024-08-06 RX ADMIN — AMLODIPINE BESYLATE 5 MG: 5 TABLET ORAL at 08:33

## 2024-08-06 RX ADMIN — METHOCARBAMOL 750 MG: 100 INJECTION INTRAMUSCULAR; INTRAVENOUS at 17:13

## 2024-08-06 RX ADMIN — FENTANYL CITRATE 25 MCG: 50 INJECTION INTRAMUSCULAR; INTRAVENOUS at 15:00

## 2024-08-06 RX ADMIN — PROPOFOL 50 MG: 10 INJECTION, EMULSION INTRAVENOUS at 13:27

## 2024-08-06 RX ADMIN — HEPARIN SODIUM 7000 UNITS: 1000 INJECTION INTRAVENOUS; SUBCUTANEOUS at 13:22

## 2024-08-06 RX ADMIN — PROPOFOL 50 MG: 10 INJECTION, EMULSION INTRAVENOUS at 13:08

## 2024-08-06 RX ADMIN — ACETAMINOPHEN 1000 MG: 10 INJECTION INTRAVENOUS at 15:57

## 2024-08-06 RX ADMIN — FENTANYL CITRATE 50 MCG: 50 INJECTION INTRAMUSCULAR; INTRAVENOUS at 13:44

## 2024-08-06 RX ADMIN — ACETAMINOPHEN 975 MG: 325 TABLET, FILM COATED ORAL at 23:49

## 2024-08-06 RX ADMIN — DEXAMETHASONE SODIUM PHOSPHATE 10 MG: 10 INJECTION INTRAMUSCULAR; INTRAVENOUS at 12:16

## 2024-08-06 RX ADMIN — Medication 100 MCG: at 12:15

## 2024-08-06 RX ADMIN — FENTANYL CITRATE 50 MCG: 50 INJECTION INTRAMUSCULAR; INTRAVENOUS at 12:23

## 2024-08-06 RX ADMIN — HYDROMORPHONE HYDROCHLORIDE 0.2 MG: 0.2 INJECTION, SOLUTION INTRAMUSCULAR; INTRAVENOUS; SUBCUTANEOUS at 15:23

## 2024-08-06 RX ADMIN — FENTANYL CITRATE 50 MCG: 50 INJECTION INTRAMUSCULAR; INTRAVENOUS at 15:57

## 2024-08-06 RX ADMIN — PROPOFOL 100 MG: 10 INJECTION, EMULSION INTRAVENOUS at 12:14

## 2024-08-06 RX ADMIN — SODIUM CHLORIDE, SODIUM LACTATE, POTASSIUM CHLORIDE, AND CALCIUM CHLORIDE 50 ML/HR: .6; .31; .03; .02 INJECTION, SOLUTION INTRAVENOUS at 17:08

## 2024-08-06 RX ADMIN — Medication 100 MCG: at 12:28

## 2024-08-06 RX ADMIN — FENTANYL CITRATE 25 MCG: 50 INJECTION INTRAMUSCULAR; INTRAVENOUS at 15:07

## 2024-08-06 RX ADMIN — OXYCODONE HYDROCHLORIDE 5 MG: 5 TABLET ORAL at 20:00

## 2024-08-06 RX ADMIN — PROPOFOL 50 MG: 10 INJECTION, EMULSION INTRAVENOUS at 12:13

## 2024-08-06 RX ADMIN — HYDROMORPHONE HYDROCHLORIDE 0.2 MG: 0.2 INJECTION, SOLUTION INTRAMUSCULAR; INTRAVENOUS; SUBCUTANEOUS at 15:13

## 2024-08-06 RX ADMIN — PROPOFOL 50 MG: 10 INJECTION, EMULSION INTRAVENOUS at 12:27

## 2024-08-06 RX ADMIN — PROPOFOL 50 MG: 10 INJECTION, EMULSION INTRAVENOUS at 12:15

## 2024-08-06 RX ADMIN — INSULIN LISPRO 2 UNITS: 100 INJECTION, SOLUTION INTRAVENOUS; SUBCUTANEOUS at 08:36

## 2024-08-06 RX ADMIN — FENTANYL CITRATE 50 MCG: 50 INJECTION INTRAMUSCULAR; INTRAVENOUS at 12:27

## 2024-08-06 RX ADMIN — ASPIRIN 81 MG: 81 TABLET, COATED ORAL at 08:33

## 2024-08-06 RX ADMIN — EZETIMIBE 10 MG: 10 TABLET ORAL at 08:32

## 2024-08-06 RX ADMIN — INSULIN LISPRO 4 UNITS: 100 INJECTION, SOLUTION INTRAVENOUS; SUBCUTANEOUS at 21:20

## 2024-08-06 RX ADMIN — ENOXAPARIN SODIUM 40 MG: 40 INJECTION SUBCUTANEOUS at 08:32

## 2024-08-06 RX ADMIN — Medication 20 MG: at 12:39

## 2024-08-06 RX ADMIN — HYDROMORPHONE HYDROCHLORIDE 0.2 MG: 0.2 INJECTION, SOLUTION INTRAMUSCULAR; INTRAVENOUS; SUBCUTANEOUS at 15:33

## 2024-08-06 RX ADMIN — HYDROMORPHONE HYDROCHLORIDE 0.2 MG: 0.2 INJECTION, SOLUTION INTRAMUSCULAR; INTRAVENOUS; SUBCUTANEOUS at 15:45

## 2024-08-06 RX ADMIN — CEFAZOLIN SODIUM 2000 MG: 2 SOLUTION INTRAVENOUS at 11:04

## 2024-08-06 RX ADMIN — CEFAZOLIN SODIUM 2000 MG: 2 SOLUTION INTRAVENOUS at 02:11

## 2024-08-06 RX ADMIN — HYDROMORPHONE HYDROCHLORIDE 0.2 MG: 0.2 INJECTION, SOLUTION INTRAMUSCULAR; INTRAVENOUS; SUBCUTANEOUS at 15:40

## 2024-08-06 RX ADMIN — LIDOCAINE HYDROCHLORIDE 50 MG: 10 INJECTION, SOLUTION EPIDURAL; INFILTRATION; INTRACAUDAL; PERINEURAL at 12:13

## 2024-08-06 RX ADMIN — Medication 100 MCG: at 12:30

## 2024-08-06 RX ADMIN — FENTANYL CITRATE 50 MCG: 50 INJECTION INTRAMUSCULAR; INTRAVENOUS at 13:31

## 2024-08-06 RX ADMIN — Medication 30 MG: at 12:28

## 2024-08-06 RX ADMIN — ONDANSETRON 4 MG: 2 INJECTION INTRAMUSCULAR; INTRAVENOUS at 12:20

## 2024-08-06 RX ADMIN — ONDANSETRON 4 MG: 2 INJECTION INTRAMUSCULAR; INTRAVENOUS at 17:07

## 2024-08-06 NOTE — OP NOTE
OPERATIVE REPORT  PATIENT NAME: Juan Carlos Cleveland Jr.    :  1955  MRN: [id number]  Pt Location: BE OR ROOM 07    SURGERY DATE: 2024    Surgeons and Role:     * Jasmin Denny MD - Primary     * Dina Izquierdo MD - Assisting     * Iván Bliss MD - Fellow    Preop Diagnosis:  S/p cardiac catheterization via right femoral access  Right groin abscess  Staphylococcus aureus bacteremia with sepsis (HCC) [A41.01]    Post-Op Diagnosis Codes:     * Cellulitis of groin, right [L03.314]     * Staphylococcus aureus bacteremia with sepsis (HCC) [A41.01]    Procedure(s):  Right groin exploration, washout, debridement of infected subcutaneous tissue and artery, arterial exploration with primary closure, deep tissue closure.   VAC placement - 2 black sponges, 1 in wound and 1 bridge    Specimen(s):  none    Estimated Blood Loss:   30cc    Drains:  [REMOVED] Urethral Catheter Non-latex 16 Fr. (Removed)   Number of days: 0       Anesthesia Type:   General LMA    Operative Indications:  Pt is a 67 yo M w/ HTN, DM, hx DVT, obesity, back pain, presented 24 with chest pain, back pain, dizziness, diaphoresis, underwent cardiac cath via R femoral access w/ angioseal closure, which was negative for significant coronary disease.  Presented 1 wk later with 5 days of fever and 1 day of right groin tenderness and redness.  Found to have MSSA bacteremia and underwent small I&D of the right groin with packing placed.  Returns for repeat washout.     Operative Findings:  Gross infection with purulence exuding from the prior I&D site once packing was removed.  There was significant induration and large inflammatory response in the right groin area with enlarged lymph nodes  There was a large cavity of infection presumbably along the prior access tract leading down to the level of the artery.  There was a thick rind around the common femoral artery along about a 5cm segment with a small area of arterial wall which did not appear  to have normal integrity.  This was presumably at the access site although ultimately, no closure device was identified either external to or within the artery itself  As the artery was quite large, bordering on aneurysmal, it was primarily closed after debridement, rather than patched.    Complications:   None    Procedure and Technique:  After informed consent was obtained, the patient was brought to the operating room and placed in the supine position. Perioperative IV antibiotics were given with ancef.  He was given anesthesia and an LMA was placed.  The packing from the previous washout was removed.  He was then prepped and draped in the usual sterile fashion exposing the right groin.  A timeout was performed.     The wound was examined and noted to have murky purulent drainage.  The small prior I&D incision was extended proximally overlying the common femoral artery.  Cautery was used to dissect through the soft tissue.  There was noted to be a large inflammatory response with significant lymphatic tissue.  There was also noted to be a cavity tracking proximally from the prior I&D site.      We freed the inguinal ligament medially and laterally and exposed the common femoral artery proximally, which appeared normal.  This was encircled with a vessel loop.  We then continued the dissection inferiorly along the common femoral artery and there was noted to be a thick rind surrounding the artery.  We continued the dissection through the femoral bifurcation, and at that point, the arteries appeared relatively normal.  The superficial femoral artery and profunda femoral artery were also encircled with vessel loops.  There was some unhealthy tissue anterior to the artery which appeared infected.  This was carefully debrided away.  Although we were looking to identify the location of the closure device, we did not see this.  The anterior surface of the artery had a small area which appeared weakened and involved in  the infectious process and thus the decision was made to explore the artery, both to debride this area and to evaluation for the closure device foot plate.    7000 units of heparin were given.  Clamps were placed on the femoral arteries.  A longitudinal arteriotomy was made in the common femoral artery over the area of deterioration and this was extended with gan scissors.  No closure device was identified within.  The was a small amount of unhealthy appearing artery wall which was debrided with gan scissors.  Once the edges appeared clean, the artery was closed primarily using 5-0 prolene, run from either end of the arteriotomy.  Primary closure was chosen due to the large caliber of the artery.    The wound was then copiously irrigated with saline.  The wound was checked for hemostasis.  The femoral sheath was reapproximated over the artery using 3-0 prolene figure of eight sutures.  The deep lymphatic tissue was similarly approximated using 3-0 prolene inturrupted figure of eight sutures.     A VAC dressing was then cut to size and placed in the wound.  2 black sponges were used, 1 in the wound and 1 as a bridge   The wound measured 8 cm L x 4 cm W x 3 cm D.  The VAC dressing was applied and the VAC set to 125mmHg low continuous suction.    The patient was allowed to awaken and was extubated. He was then transferred to the PACU for postoperative care.     I was present for all critical portions of the procedure.    Patient Disposition:  PACU         SIGNATURE: Jasmin Denny MD  DATE: August 6, 2024  TIME: 2:42 PM

## 2024-08-06 NOTE — PROGRESS NOTES
[facility]  Progress Note  Name: Juan Carlos Cunningham I  MRN: [id number]  Unit/Bed#: OR POOL I Date of Admission: 8/2/2024   Date of Service: 8/6/2024 I Hospital Day: 4    Assessment & Plan   Sepsis (HCC)  Assessment & Plan  Patient with PMH of hypertension, diabetes, DVT, NSTEMI  who recently had Cardiac cath (Status postcardiac cath revealing no significant CAD to suggest NSTEMI per cardiology) done on 7/26 (6 days ago) presented with a 5-day history of fever, 1 day history of red, warm, tender lump in groin.           Lab Results   Component Value Date     WBC 13.38 (H) 08/02/2024     HGB 10.7 (L) 08/02/2024     HCT 32.2 (L) 08/02/2024     MCV 92 08/02/2024      08/02/2024      Plan  Monitor vitals  Continue cefepime(2g/50ml dextrose), vancomycin (2000mg)  Blood cultures positive for GPC is clusters, BCID is staph sedrick  Repeat blood cultures post op to confirm resolution of bacteremia  Follow up surgical culture results  Consult infectious disease  Patient antibiotics adjusted from cefepime vancomycin to cefazolin due to MSSA 2 out of 2 and blood cultures, echo ordered-negative for vegetations  Repeat culture negative-if still clear tomorrow can place picc per ID    Class 1 obesity in adult  Assessment & Plan  Affects all aspects of his care  Turn q2h  Weight loss counseling when stable as an outpatient    Hypertension  Assessment & Plan  On home medication of lisinopril hydrochlorothiazide.  Blood pressure is well-controlled at this time.  Given elevated creatinine, hold off on lisinopril hydrochlorothiazide.  In the interim, start amlodipine 5 mg daily.  Monitor vitals, adjust dosing as needed.    Type 2 diabetes mellitus (HCC)  Assessment & Plan  Lab Results   Component Value Date    HGBA1C 8.0 (H) 07/26/2024       Recent Labs     08/05/24  2040 08/06/24  0620 08/06/24  1033 08/06/24  1444   POCGLU 222* 173* 184* 163*         Blood Sugar Average: Last 72 hrs:  (P)  [id number] medications include pioglitazone metformin combination.  Hold home medication during hospitalization in light of elevated creatinine.  Monitor blood glucose, insulin per sliding scale [facility]      * Hematoma  Assessment & Plan  Patient with a history of hypertension, diabetes, DVT and NSTEMI who recently had cardiac catheterization (7/26) 6 days ago presents with right groin swelling, fevers, decreased appetite and headaches.   Transferred to [facility] for a vascular surgery consultation.  S/p I&D in the OR, repeat planned for 8/7  Follow up cultures  Vancomycin adjusted to cefazolin due to MSSA blood culture positive  Patient pending repeat I/d due to ongoing significant purulence           VTE Pharmacologic Prophylaxis:   Moderate Risk (Score 3-4) - Pharmacological DVT Prophylaxis Ordered: enoxaparin (Lovenox).    Mobility:   Basic Mobility Inpatient Raw Score: 24  JH-HLM Goal: 8: Walk 250 feet or more  JH-HLM Achieved: 8: Walk 250 feet ot more  JH-HLM Goal achieved. Continue to encourage appropriate mobility.    Patient Centered Rounds: I performed bedside rounds with nursing staff today.   Discussions with Specialists or Other Care Team Provider: N/A    Education and Discussions with Family / Patient: Patient declined call to .     Total Time Spent on Date of Encounter in care of patient: 45 mins. This time was spent on one or more of the following: performing physical exam; counseling and coordination of care; obtaining or reviewing history; documenting in the medical record; reviewing/ordering tests, medications or procedures; communicating with other healthcare professionals and discussing with patient's family/caregivers.    Current Length of Stay: 4 day(s)  Current Patient Status: Inpatient   Certification Statement: The patient will continue to require additional inpatient hospital stay due to Inguinal wound  Discharge Plan: Anticipate discharge in 24-48 hrs to  home.    Code Status: Level 1 - Full Code    Subjective:   Seen and examined at bedside. Patient resting comfortably. No new complaints. Planned for OR later today    Objective:     Vitals:   Temp (24hrs), Av.3 °F (36.8 °C), Min:97.8 °F (36.6 °C), Max:98.7 °F (37.1 °C)    Temp:  [97.8 °F (36.6 °C)-98.7 °F (37.1 °C)] 98.7 °F (37.1 °C)  HR:  [56-69] 62  Resp:  [12-20] 12  BP: (132-178)/(59-80) 142/69  SpO2:  [95 %-98 %] 98 %  Body mass index is 34.45 kg/m².     Input and Output Summary (last 24 hours):     Intake/Output Summary (Last 24 hours) at 2024 1535  Last data filed at 2024 1424  Gross per 24 hour   Intake 840 ml   Output 15 ml   Net 825 ml       Physical Exam:   Physical Exam  Vitals and nursing note reviewed.   Constitutional:       General: He is not in acute distress.     Appearance: He is well-developed. He is not toxic-appearing or diaphoretic.   HENT:      Head: Normocephalic and atraumatic.   Eyes:      General: No scleral icterus.     Conjunctiva/sclera: Conjunctivae normal.   Cardiovascular:      Rate and Rhythm: Normal rate and regular rhythm.      Heart sounds: No murmur heard.     No friction rub. No gallop.   Pulmonary:      Effort: Pulmonary effort is normal. No respiratory distress.      Breath sounds: Normal breath sounds. No stridor. No wheezing, rhonchi or rales.   Chest:      Chest wall: No tenderness.   Abdominal:      General: There is no distension.      Palpations: Abdomen is soft. There is no mass.      Tenderness: There is no abdominal tenderness. There is no guarding or rebound.      Hernia: No hernia is present.   Musculoskeletal:         General: No swelling or tenderness.      Cervical back: Neck supple.      Comments: Right groin dressing c/d/i   Skin:     General: Skin is warm and dry.      Capillary Refill: Capillary refill takes less than 2 seconds.   Neurological:      Mental Status: He is alert and oriented to person, place, and time.   Psychiatric:         Mood  and Affect: Mood normal.          Additional Data:     Labs:  Results from last 7 days   Lab Units 08/06/24  0527 08/05/24  0544   WBC Thousand/uL 7.65 8.43   HEMOGLOBIN g/dL 10.5* 11.6*   HEMATOCRIT % 31.8* 35.7*   PLATELETS Thousands/uL 464* 460*   SEGS PCT %  --  78*   LYMPHO PCT %  --  10*   MONO PCT %  --  7   EOS PCT %  --  3     Results from last 7 days   Lab Units 08/06/24  0527 08/03/24  0517 08/02/24  0452   SODIUM mmol/L 138   < > 134*   POTASSIUM mmol/L 3.7   < > 3.9   CHLORIDE mmol/L 104   < > 103   CO2 mmol/L 26   < > 22   BUN mg/dL 18   < > 30*   CREATININE mg/dL 0.72   < > 1.11   ANION GAP mmol/L 8   < > 9   CALCIUM mg/dL 8.6   < > 8.6   ALBUMIN g/dL  --   --  3.3*   TOTAL BILIRUBIN mg/dL  --   --  0.65   ALK PHOS U/L  --   --  100   ALT U/L  --   --  22   AST U/L  --   --  23   GLUCOSE RANDOM mg/dL 152*   < > 162*    < > = values in this interval not displayed.         Results from last 7 days   Lab Units 08/06/24  1444 08/06/24  1033 08/06/24  0620 08/05/24  2040 08/05/24  1540 08/05/24  1035 08/05/24  0645 08/04/24  2048 08/04/24  1544 08/04/24  1134 08/04/24  0650 08/03/24  2115   POC GLUCOSE mg/dl 163* 184* 173* 222* 235* 229* 190* 235* 257* 197* 182* 171*         Results from last 7 days   Lab Units 08/01/24  1026   LACTIC ACID mmol/L 2.0   PROCALCITONIN ng/ml 0.15       Lines/Drains:  Invasive Devices       Peripheral Intravenous Line  Duration             Peripheral IV 08/05/24 Left;Proximal;Ventral (anterior) Forearm 1 day                          Imaging: Reviewed radiology reports from this admission including: chest xray    Recent Cultures (last 7 days):   Results from last 7 days   Lab Units 08/02/24  1930 08/02/24  1916 08/02/24  1634 08/01/24  1015   BLOOD CULTURE  No Growth at 72 hrs. No Growth at 72 hrs.  --  Staphylococcus aureus*  Staphylococcus aureus*   GRAM STAIN RESULT   --   --  2+ Disintegrating polys*  2+ Gram positive cocci in clusters* Gram positive cocci in clusters*   Gram positive cocci in clusters*       Last 24 Hours Medication List:   Current Facility-Administered Medications   Medication Dose Route Frequency Provider Last Rate    [Transfer Hold] acetaminophen  650 mg Oral Q6H PRN uS Dey PA-C      albuterol  2.5 mg Nebulization Once PRN Max Paredes CRNA      [Transfer Hold] amLODIPine  5 mg Oral Daily Jagdish Flynn MD      [Transfer Hold] aspirin  81 mg Oral Daily Jagdish Flynn MD      [Transfer Hold] atorvastatin  40 mg Oral Daily With Dinner Jagdish Flynn MD      [Transfer Hold] cefazolin  2,000 mg Intravenous Q8H Velma Carpenter MD 2,000 mg (08/06/24 1104)    cefazolin  2,000 mg Intravenous Once Su Dey PA-C      chlorhexidine  15 mL Swish & Spit Once Su Dey PA-C      [Transfer Hold] enoxaparin  40 mg Subcutaneous Daily Jagdish Flynn MD      [Transfer Hold] ezetimibe  10 mg Oral Daily Jgadish Flynn MD      fentaNYL  25 mcg Intravenous Q5 Min PRN Max Paredes CRNA      HYDROmorphone  0.2 mg Intravenous Q10 Min PRN Max Paredes CRNA      [Transfer Hold] insulin lispro  2-12 Units Subcutaneous 4x Daily (AC & HS) Jagdish Flynn MD      lactated ringers  100 mL/hr Intravenous Continuous Max Paredes CRNA      ondansetron  4 mg Intravenous Once PRN Max Paredes CRNA          Today, Patient Was Seen By: Chadd Smith    **Please Note: This note may have been constructed using a voice recognition system.**

## 2024-08-06 NOTE — ASSESSMENT & PLAN NOTE
Lab Results   Component Value Date    HGBA1C 8.0 (H) 07/26/2024       Recent Labs     08/05/24  2040 08/06/24  0620 08/06/24  1033 08/06/24  1444   POCGLU 222* 173* 184* 163*         Blood Sugar Average: Last 72 hrs:  (P) [id number] medications include pioglitazone metformin combination.  Hold home medication during hospitalization in light of elevated creatinine.  Monitor blood glucose, insulin per sliding scale [facility]

## 2024-08-06 NOTE — CASE MANAGEMENT
Case Management Discharge Planning Note    Patient name Juan Carlos Cleveland Jr.  Location [facility] 524/[facility] 524-01 MRN [id number]  : 1955 Date 2024       Current Admission Date: 2024  Current Admission Diagnosis:Hematoma   Patient Active Problem List    Diagnosis Date Noted Date Diagnosed    Sepsis (HCC) 2024     Fever 2024     Hematoma 2024     Elevated serum creatinine 2024     Type 2 diabetes mellitus (HCC) 2024     Hypertension 2024     Class 1 obesity in adult 2024     Chronic bilateral low back pain without sciatica 2024     Cardiac murmur 2024     Other constipation 10/10/2023     BRBPR (bright red blood per rectum) 10/10/2023     Dysphagia 10/10/2023     Blood in stool 2020     History of DVT (deep vein thrombosis) 2020     Localized swelling, mass and lump, right lower limb 2020     Chest pain 2018       LOS (days): 4  Geometric Mean LOS (GMLOS) (days): 4.4  Days to GMLOS:0.4     OBJECTIVE:  Risk of Unplanned Readmission Score: 13.58         Current admission status: Inpatient   Preferred Pharmacy:   [id number] - [place] - [address]  [address] [address]  Phone: [phone] Fax: [phone]    Primary Care Provider: Todd Nolasco DO    Primary Insurance: MEDICARE  Secondary Insurance: BLUE CROSS    DISCHARGE DETAILS:    Other Referral/Resources/Interventions Provided:  Referral Comments: [facility] reserved in [facility], medication covered 100%, will need home wound vac per vascular, paperwork uploaded to  referral for authorization purposes, patient and spouse aware and in agreement with [facility]

## 2024-08-06 NOTE — QUICK NOTE
Post Op Check Note - Vascular Surgery   Juan Carlos Cleveland Jr. 68 y.o. male MRN: [id number]  Unit/Bed#: [facility] 524-01 Encounter: [id number]    ASSESSMENT:  Juan Carlos Cleveland Jr. is a 68 y.o. male who is status post Right groin washout, debridement of infected subq tissue and artery, arterial exploration, with vac placement.     Subjective: Patient seen and evaluated at bedside. Reported minimal pain however does report nausea. Denies any fevers, chills, chest pain, or shortness of breath.     Physical Exam:  GEN: NAD  CV: RRR  Lung: Normal effort  Ab: Soft, NT/ND  Neuro: A+Ox3  Incisions: Clean dry and intact. Vac wi1th appropriate seal.   Pulses: BL Palp, DP/PT    Blood pressure (!) 185/82, pulse 68, temperature 97.8 °F (36.6 °C), resp. rate 16, height 6' (1.829 m), weight 115 kg (254 lb), SpO2 98%.,Body mass index is 34.45 kg/m².      Intake/Output Summary (Last 24 hours) at 8/6/2024 1710  Last data filed at 8/6/2024 1424  Gross per 24 hour   Intake 840 ml   Output 15 ml   Net 825 ml       Invasive Devices       Peripheral Intravenous Line  Duration             Peripheral IV 08/05/24 Left;Proximal;Ventral (anterior) Forearm 1 day                    VTE Pharmacologic Prophylaxis: Heparin

## 2024-08-06 NOTE — ANESTHESIA POSTPROCEDURE EVALUATION
Post-Op Assessment Note    CV Status:  Stable  Pain Score: 0    Pain management: adequate       Mental Status:  Alert and awake   Hydration Status:  Euvolemic   PONV Controlled:  Controlled   Airway Patency:  Patent     Post Op Vitals Reviewed: Yes    No anethesia notable event occurred.    Staff: Anesthesiologist, CRNA               BP (!) 178/80 (08/06/24 1440)    Temp 98.7 °F (37.1 °C) (08/06/24 1440)    Pulse 67 (08/06/24 1440)   Resp 20 (08/06/24 1440)    SpO2 98 % (08/06/24 1440)

## 2024-08-06 NOTE — ANESTHESIA PREPROCEDURE EVALUATION
Procedure:  DEBRIDEMENT WOUND AND DRESSING CHANGE (WASH OUT)-- R groin re-exploration, washout, possible VAC placement (Right: Leg)    LMA size 5 placed for prior washout  PONV - pt denies    NPO approp    Obesity BMI 34  Typed and screened      Left Ventricle: Left ventricular cavity size is normal. Wall thickness is mildly increased. The left ventricular ejection fraction is 65%. Systolic function is normal. Wall motion is normal.    Right Ventricle: Right ventricular cavity size is upper normal. Systolic function is normal.    Aortic Valve: The aortic valve is trileaflet. The leaflets are moderately calcified. There is mildly reduced mobility. There is mild stenosis.    Mitral Valve: There is mild annular calcification.  Relevant Problems   CARDIO   (+) Cardiac murmur   (+) Chest pain   (+) Hypertension      ENDO   (+) Type 2 diabetes mellitus (HCC)      GI/HEPATIC   (+) BRBPR (bright red blood per rectum)   (+) Dysphagia      MUSCULOSKELETAL   (+) Chronic bilateral low back pain without sciatica      NEURO/PSYCH   (+) Chronic bilateral low back pain without sciatica        Physical Exam    Airway    Mallampati score: III  TM Distance: >3 FB  Neck ROM: full     Dental   No notable dental hx     Cardiovascular  Murmur    Pulmonary  Pulmonary exam normal     Other Findings        Anesthesia Plan  ASA Score- 3     Anesthesia Type- general with ASA Monitors.         Additional Monitors:     Airway Plan: LMA.    Comment: Risks/benefits and alternatives discussed with patient including possible PONV, sore throat, damage to teeth/lips/gums/esophagus, and possibility of rare anesthetic and surgical emergencies including but not limited to heart attack, stroke, and/or death. All questions were answered.  .       Plan Factors-    Chart reviewed.   Existing labs reviewed. Patient summary reviewed.    Patient is not a current smoker.              Induction- intravenous.    Postoperative Plan- Plan for postoperative opioid  use.         Informed Consent- Anesthetic plan and risks discussed with patient.  I personally reviewed this patient with the CRNA. Discussed and agreed on the Anesthesia Plan with the CRNA..

## 2024-08-06 NOTE — PROGRESS NOTES
Patient:  RAINE KO    MRN:  [id number]    [facility] Request ID:  [id number]    Level of care reserved:  Infusion    Partner Reserved:  [facility], [place] [address] [phone]    Clinical needs requested:    Geography searched:  [id number]    Start of Service:    Request sent:  2:27pm EDT on 8/6/2024 by Whit Tatum    Partner reserved:  4:10pm EDT on 8/6/2024 by Whit Tatum    Choice list shared:

## 2024-08-06 NOTE — CASE MANAGEMENT
Case Management Discharge Planning Note    Patient name Juan Carlos Cleveland Jr.  Location OR POOL/OR POOL MRN [id number]  : 1955 Date 2024       Current Admission Date: 2024  Current Admission Diagnosis:Hematoma   Patient Active Problem List    Diagnosis Date Noted Date Diagnosed    Sepsis (HCC) 2024     Fever 2024     Hematoma 2024     Elevated serum creatinine 2024     Type 2 diabetes mellitus (HCC) 2024     Hypertension 2024     Class 1 obesity in adult 2024     Chronic bilateral low back pain without sciatica 2024     Cardiac murmur 2024     Other constipation 10/10/2023     BRBPR (bright red blood per rectum) 10/10/2023     Dysphagia 10/10/2023     Blood in stool 2020     History of DVT (deep vein thrombosis) 2020     Localized swelling, mass and lump, right lower limb 2020     Chest pain 2018       LOS (days): 4  Geometric Mean LOS (GMLOS) (days): 4.4  Days to GMLOS:0.5     OBJECTIVE:  Risk of Unplanned Readmission Score: 12.15         Current admission status: Inpatient   Preferred Pharmacy:   [id number] - [place] - [address]  [address] [address]  Phone: [phone] Fax: [phone]    Primary Care Provider: Todd Nolasco DO    Primary Insurance: MEDICARE  Secondary Insurance: BLUE CROSS    DISCHARGE DETAILS:    Other Referral/Resources/Interventions Provided:  Referral Comments: Script for Cefazolin uploaded to [facility] referral, referral sent to [facility] to price the cefazolin. [facility] made aware that patient is currently on 0200,1000, and 1800 dosing schedule

## 2024-08-06 NOTE — PLAN OF CARE
Problem: PAIN - ADULT  Goal: Verbalizes/displays adequate comfort level or baseline comfort level  Description: Interventions:  - Encourage patient to monitor pain and request assistance  - Assess pain using appropriate pain scale  - Administer analgesics based on type and severity of pain and evaluate response  - Implement non-pharmacological measures as appropriate and evaluate response  - Consider cultural and social influences on pain and pain management  - Notify physician/advanced practitioner if interventions unsuccessful or patient reports new pain  Outcome: Progressing     Problem: INFECTION - ADULT  Goal: Absence or prevention of progression during hospitalization  Description: INTERVENTIONS:  - Assess and monitor for signs and symptoms of infection  - Monitor lab/diagnostic results  - Monitor all insertion sites, i.e. indwelling lines, tubes, and drains  - Monitor endotracheal if appropriate and nasal secretions for changes in amount and color  - [place] appropriate cooling/warming therapies per order  - Administer medications as ordered  - Instruct and encourage patient and family to use good hand hygiene technique  - Identify and instruct in appropriate isolation precautions for identified infection/condition  Outcome: Progressing  Goal: Absence of fever/infection during neutropenic period  Description: INTERVENTIONS:  - Monitor WBC    Outcome: Progressing     Problem: SAFETY ADULT  Goal: Patient will remain free of falls  Description: INTERVENTIONS:  - Educate patient/family on patient safety including physical limitations  - Instruct patient to call for assistance with activity   - Consult OT/PT to assist with strengthening/mobility   - Keep Call bell within reach  - Keep bed low and locked with side rails adjusted as appropriate  - Keep care items and personal belongings within reach  - Initiate and maintain comfort rounds  - Make Fall Risk Sign visible to staff  - Offer Toileting every  Hours,  in advance of need  - Initiate/Maintain alarm  - Obtain necessary fall risk management equipment:   - Apply yellow socks and bracelet for high fall risk patients  - Consider moving patient to room near nurses station  Outcome: Progressing  Goal: Maintain or return to baseline ADL function  Description: INTERVENTIONS:  -  Assess patient's ability to carry out ADLs; assess patient's baseline for ADL function and identify physical deficits which impact ability to perform ADLs (bathing, care of mouth/teeth, toileting, grooming, dressing, etc.)  - Assess/evaluate cause of self-care deficits   - Assess range of motion  - Assess patient's mobility; develop plan if impaired  - Assess patient's need for assistive devices and provide as appropriate  - Encourage maximum independence but intervene and supervise when necessary  - Involve family in performance of ADLs  - Assess for home care needs following discharge   - Consider OT consult to assist with ADL evaluation and planning for discharge  - Provide patient education as appropriate  Outcome: Progressing  Goal: Maintains/Returns to pre admission functional level  Description: INTERVENTIONS:  - Perform AM-PAC 6 Click Basic Mobility/ Daily Activity assessment daily.  - Set and communicate daily mobility goal to care team and patient/family/caregiver.   - Collaborate with rehabilitation services on mobility goals if consulted  - Perform Range of Motion times a day.  - Reposition patient every  hours.  - Dangle patient  times a day  - Stand patient  times a day  - Ambulate patient  times a day  - Out of bed to chair  times a day   - Out of bed for meals  times a day  - Out of bed for toileting  - Record patient progress and toleration of activity level   Outcome: Progressing     Problem: DISCHARGE PLANNING  Goal: Discharge to home or other facility with appropriate resources  Description: INTERVENTIONS:  - Identify barriers to discharge w/patient and caregiver  - Arrange for  needed discharge resources and transportation as appropriate  - Identify discharge learning needs (meds, wound care, etc.)  - Arrange for interpretive services to assist at discharge as needed  - Refer to Case Management Department for coordinating discharge planning if the patient needs post-hospital services based on physician/advanced practitioner order or complex needs related to functional status, cognitive ability, or social support system  Outcome: Progressing     Problem: Knowledge Deficit  Goal: Patient/family/caregiver demonstrates understanding of disease process, treatment plan, medications, and discharge instructions  Description: Complete learning assessment and assess knowledge base.  Interventions:  - Provide teaching at level of understanding  - Provide teaching via preferred learning methods  Outcome: Progressing

## 2024-08-06 NOTE — PROGRESS NOTES
Progress Note - Infectious Disease   Juan Carlos Cleveland Jr. 68 y.o. male MRN: [id number]  Unit/Bed#: [facility] 524-01 Encounter: [id number]      Impression/Plan:    1.  Fever.  Due to #2/3 below.  Fever improved with antibiotics and status post right groin I&D x2.  The patient is hemodynamically stable.              -Continue IV cefazolin 2 g every 8 hours              -Follow-up blood cultures              -Vascular surgery follow-up ongoing               -Repeat CBC tomorrow to monitor treatment response              -Monitor fever curve     2. MSSA bacteremia.  Both sets admission blood cultures growing MSSA.  Due to infected right groin hematoma.  No intravascular prosthetic devices present. TTE no vegetations (adequate study)              -IV cefazolin 2 g every 8 hours as above              -Follow-up blood cultures              -Okay for PICC placement   -Due to vascular involvement of infection, patient will require a 6-week course of IV cefazolin through 9/12/2024   -Weekly CBC with differential and creatinine on IV antibiotics   -Will arrange ID follow-up 2 weeks after discharge     3.  Infected right groin hematoma.  Following cardiac catheterization 7/26 with right femoral artery access.  Lower extremity arterial duplex showed a right groin hematoma, no pseudoaneurysm or AV fistula.  Status post right groin washout with vascular surgery 8/2/2024, tissue culture also growing MSSA.  Status post repeat washout 8/6, there was purulence exuding from the prior I&D site with tracking of the infection around the common femoral artery              -Antibiotics as above              -Vascular surgery follow-up ongoing              -Monitor right groin site     4.  Type 2 diabetes mellitus.  Hemoglobin A1c 8%.  Risk factor for infection.  Glycemic control per primary team.     I have discussed the above management plan to continue IV cefazolin with the primary team.  Discussed with the patient and wife at bedside.  ID  will follow.    Antibiotics:  IV cefazolin  Abx day 6    Subjective:  Patient taken for repeat I&D today.  He was seen after surgery and reports significant nausea.  He also has tenderness over the right groin site.  Wound VAC is now in place.  The patient is afebrile.    Objective:  Vitals:  Temp:  [97.8 °F (36.6 °C)-98.7 °F (37.1 °C)] 97.8 °F (36.6 °C)  HR:  [56-69] 59  Resp:  [12-20] 16  BP: (132-185)/(59-82) 172/75  SpO2:  [95 %-99 %] 98 %  Temp (24hrs), Av.1 °F (36.7 °C), Min:97.8 °F (36.6 °C), Max:98.7 °F (37.1 °C)  Current: Temperature: 97.8 °F (36.6 °C)    Physical Exam:   General Appearance:  Alert, interactive, nontoxic, no acute distress.   Throat: Oropharynx moist without lesions.    Lungs:   Clear to auscultation bilaterally; no wheezes, rhonchi or rales; respirations unlabored   Heart:  RRR; no murmur, rub or gallop   Abdomen:   Soft, non-tender, non-distended, positive bowel sounds.     Extremities: No clubbing, cyanosis or edema   Skin: Right groin wound VAC in place with surrounding erythema       Labs:   All pertinent labs and imaging studies were personally reviewed  Results from last 7 days   Lab Units 24  0524  0544 24  0517   WBC Thousand/uL 7.65 8.43 11.07*   HEMOGLOBIN g/dL 10.5* 11.6* 10.3*   PLATELETS Thousands/uL 464* 460* 308     Results from last 7 days   Lab Units 24  0527 24  0544 24  0517 24  0452   SODIUM mmol/L 138 137 135 134*   POTASSIUM mmol/L 3.7 3.7 3.8 3.9   CHLORIDE mmol/L 104 102 101 103   CO2 mmol/L 26 24 25 22   BUN mg/dL 18 19 22 30*   CREATININE mg/dL 0.72 0.90 0.96 1.11   EGFR ml/min/1.73sq m 95 87 80 67   CALCIUM mg/dL 8.6 8.8 8.4 8.6   AST U/L  --   --   --  23   ALT U/L  --   --   --  22   ALK PHOS U/L  --   --   --  100     Results from last 7 days   Lab Units 24  1026   PROCALCITONIN ng/ml 0.15                   Micro:  Results from last 7 days   Lab Units 24  1930 24  1916 24  1636  08/01/24  1015   BLOOD CULTURE  No Growth at 72 hrs. No Growth at 72 hrs.  --  Staphylococcus aureus*  Staphylococcus aureus*   GRAM STAIN RESULT   --   --  2+ Disintegrating polys*  2+ Gram positive cocci in clusters* Gram positive cocci in clusters*  Gram positive cocci in clusters*       Imaging:  I have personally reviewed pertinent imaging study reports and images in PACS.  Lower extremity arterial duplex: Right groin hematoma, no evidence of pseudoaneurysm or arteriovenous fistula

## 2024-08-06 NOTE — PROGRESS NOTES
Progress Note - Vascular Surgery   Juan Carlos Cleveland Jr. 68 y.o. male MRN: [id number]  Unit/Bed#: [facility] 524-01 Encounter: [id number]    Assessment:  Juan Carlos Cleveland Jr. is a 68 y.o. male w/ hx of HTN, DM, DVT, CAD s/p cardiac cath via R-fem access c/b right groin hematoma now presenting w/ bacteremia and right groin hematoma infection.      7/26- S/p cardiac cathetertization with femoral access      8/2 - s/p right groin incision, drainage, and exploration      Plan:  Right groin infected hematoma   Dressing/packing change with persistent purulence today 8/6, wound re-packed with 1/2 in packing strip  Return to OR today 8/6 1435 for repeat I&D given persistent purulent drainage w/ Dr. Zee  Bacteremia w/ MSSA, tissue cx w/ staph aureus   ID note appreciated. Anticipate at least a 4-week course of IV antibiotics with PICC line  Continue Ancef for now  Continue aspirin and statin therapy   Glucose control   PRN pain control   Encourage ambulation  Incentive spirometry 10 times/hour while awake  Type 2 diabetes mellitus.  Hemoglobin A1c 8%. Glycemic control per primary team.  Remainder of care per primary team      Subjective/Objective   No acute events overnight. Pain is well controlled. Has been ambulating without significant issue. Denies fever, chills, SOB, M/S changes.    Objective:     Blood pressure 132/60, pulse 56, temperature 98.1 °F (36.7 °C), temperature source Oral, resp. rate 20, height 6' (1.829 m), weight 115 kg (254 lb), SpO2 97%.,Body mass index is 34.45 kg/m².      Intake/Output Summary (Last 24 hours) at 8/6/2024 0658  Last data filed at 8/5/2024 1758  Gross per 24 hour   Intake 950 ml   Output --   Net 950 ml       Invasive Devices       Peripheral Intravenous Line  Duration             Peripheral IV 08/05/24 Left;Proximal;Ventral (anterior) Forearm 1 day                    Physical Exam:   GEN: NAD  HEENT: NCAT, MMM  CV: Regular rate, normotensive  Lung: Normal effort, saturating well  Ab: Soft,  NT/ND  Extrem: Right groin w/ persistent erythema surrounding incision, 2cm incisional wound packing in place, and with drainage of purulent fluid. M/S intact, 2+ DP b/l  Neuro: A+Ox3     Recent Results (from the past 36 hour(s))   Fingerstick Glucose (POCT)    Collection Time: 08/04/24  8:48 PM   Result Value Ref Range    POC Glucose 235 (H) 65 - 140 mg/dl   CBC and differential    Collection Time: 08/05/24  5:44 AM   Result Value Ref Range    WBC 8.43 4.31 - 10.16 Thousand/uL    RBC 3.88 3.88 - 5.62 Million/uL    Hemoglobin 11.6 (L) 12.0 - 17.0 g/dL    Hematocrit 35.7 (L) 36.5 - 49.3 %    MCV 92 82 - 98 fL    MCH 29.9 26.8 - 34.3 pg    MCHC 32.5 31.4 - 37.4 g/dL    RDW 14.1 11.6 - 15.1 %    MPV 9.3 8.9 - 12.7 fL    Platelets 460 (H) 149 - 390 Thousands/uL    nRBC 0 /100 WBCs    Segmented % 78 (H) 43 - 75 %    Immature Grans % 1 0 - 2 %    Lymphocytes % 10 (L) 14 - 44 %    Monocytes % 7 4 - 12 %    Eosinophils Relative 3 0 - 6 %    Basophils Relative 1 0 - 1 %    Absolute Neutrophils 6.57 1.85 - 7.62 Thousands/µL    Absolute Immature Grans 0.09 0.00 - 0.20 Thousand/uL    Absolute Lymphocytes 0.86 0.60 - 4.47 Thousands/µL    Absolute Monocytes 0.57 0.17 - 1.22 Thousand/µL    Eosinophils Absolute 0.29 0.00 - 0.61 Thousand/µL    Basophils Absolute 0.05 0.00 - 0.10 Thousands/µL   Basic metabolic panel    Collection Time: 08/05/24  5:44 AM   Result Value Ref Range    Sodium 137 135 - 147 mmol/L    Potassium 3.7 3.5 - 5.3 mmol/L    Chloride 102 96 - 108 mmol/L    CO2 24 21 - 32 mmol/L    ANION GAP 11 4 - 13 mmol/L    BUN 19 5 - 25 mg/dL    Creatinine 0.90 0.60 - 1.30 mg/dL    Glucose 163 (H) 65 - 140 mg/dL    Calcium 8.8 8.4 - 10.2 mg/dL    eGFR 87 ml/min/1.73sq m   Fingerstick Glucose (POCT)    Collection Time: 08/05/24  6:45 AM   Result Value Ref Range    POC Glucose 190 (H) 65 - 140 mg/dl   Fingerstick Glucose (POCT)    Collection Time: 08/05/24 10:35 AM   Result Value Ref Range    POC Glucose 229 (H) 65 - 140 mg/dl    Fingerstick Glucose (POCT)    Collection Time: 08/05/24  3:40 PM   Result Value Ref Range    POC Glucose 235 (H) 65 - 140 mg/dl   Fingerstick Glucose (POCT)    Collection Time: 08/05/24  8:40 PM   Result Value Ref Range    POC Glucose 222 (H) 65 - 140 mg/dl   CBC    Collection Time: 08/06/24  5:27 AM   Result Value Ref Range    WBC 7.65 4.31 - 10.16 Thousand/uL    RBC 3.52 (L) 3.88 - 5.62 Million/uL    Hemoglobin 10.5 (L) 12.0 - 17.0 g/dL    Hematocrit 31.8 (L) 36.5 - 49.3 %    MCV 90 82 - 98 fL    MCH 29.8 26.8 - 34.3 pg    MCHC 33.0 31.4 - 37.4 g/dL    RDW 14.0 11.6 - 15.1 %    Platelets 464 (H) 149 - 390 Thousands/uL    MPV 9.0 8.9 - 12.7 fL   Basic metabolic panel    Collection Time: 08/06/24  5:27 AM   Result Value Ref Range    Sodium 138 135 - 147 mmol/L    Potassium 3.7 3.5 - 5.3 mmol/L    Chloride 104 96 - 108 mmol/L    CO2 26 21 - 32 mmol/L    ANION GAP 8 4 - 13 mmol/L    BUN 18 5 - 25 mg/dL    Creatinine 0.72 0.60 - 1.30 mg/dL    Glucose 152 (H) 65 - 140 mg/dL    Calcium 8.6 8.4 - 10.2 mg/dL    eGFR 95 ml/min/1.73sq m   Type and screen    Collection Time: 08/06/24  5:27 AM   Result Value Ref Range    ABO Grouping A     Rh Factor Positive     Antibody Screen Negative     Specimen Expiration Date [id number]    Fingerstick Glucose (POCT)    Collection Time: 08/06/24  6:20 AM   Result Value Ref Range    POC Glucose 173 (H) 65 - 140 mg/dl

## 2024-08-07 LAB
ANION GAP SERPL CALCULATED.3IONS-SCNC: 12 MMOL/L (ref 4–13)
BACTERIA BLD CULT: NORMAL
BACTERIA BLD CULT: NORMAL
BUN SERPL-MCNC: 21 MG/DL (ref 5–25)
CALCIUM SERPL-MCNC: 8.4 MG/DL (ref 8.4–10.2)
CHLORIDE SERPL-SCNC: 102 MMOL/L (ref 96–108)
CO2 SERPL-SCNC: 23 MMOL/L (ref 21–32)
CREAT SERPL-MCNC: 0.85 MG/DL (ref 0.6–1.3)
ERYTHROCYTE [DISTWIDTH] IN BLOOD BY AUTOMATED COUNT: 13.8 % (ref 11.6–15.1)
GFR SERPL CREATININE-BSD FRML MDRD: 89 ML/MIN/1.73SQ M
GLUCOSE SERPL-MCNC: 204 MG/DL (ref 65–140)
GLUCOSE SERPL-MCNC: 206 MG/DL (ref 65–140)
GLUCOSE SERPL-MCNC: 214 MG/DL (ref 65–140)
GLUCOSE SERPL-MCNC: 241 MG/DL (ref 65–140)
GLUCOSE SERPL-MCNC: 247 MG/DL (ref 65–140)
HCT VFR BLD AUTO: 31 % (ref 36.5–49.3)
HGB BLD-MCNC: 10.2 G/DL (ref 12–17)
MCH RBC QN AUTO: 29.8 PG (ref 26.8–34.3)
MCHC RBC AUTO-ENTMCNC: 32.9 G/DL (ref 31.4–37.4)
MCV RBC AUTO: 91 FL (ref 82–98)
PLATELET # BLD AUTO: 489 THOUSANDS/UL (ref 149–390)
PMV BLD AUTO: 8.9 FL (ref 8.9–12.7)
POTASSIUM SERPL-SCNC: 4.1 MMOL/L (ref 3.5–5.3)
RBC # BLD AUTO: 3.42 MILLION/UL (ref 3.88–5.62)
SODIUM SERPL-SCNC: 137 MMOL/L (ref 135–147)
WBC # BLD AUTO: 12.3 THOUSAND/UL (ref 4.31–10.16)

## 2024-08-07 PROCEDURE — 99232 SBSQ HOSP IP/OBS MODERATE 35: CPT | Performed by: STUDENT IN AN ORGANIZED HEALTH CARE EDUCATION/TRAINING PROGRAM

## 2024-08-07 PROCEDURE — 82948 REAGENT STRIP/BLOOD GLUCOSE: CPT

## 2024-08-07 PROCEDURE — 36569 INSJ PICC 5 YR+ W/O IMAGING: CPT

## 2024-08-07 PROCEDURE — 85027 COMPLETE CBC AUTOMATED: CPT | Performed by: SURGERY

## 2024-08-07 PROCEDURE — 99024 POSTOP FOLLOW-UP VISIT: CPT | Performed by: SURGERY

## 2024-08-07 PROCEDURE — 80048 BASIC METABOLIC PNL TOTAL CA: CPT | Performed by: SURGERY

## 2024-08-07 RX ADMIN — ACETAMINOPHEN 975 MG: 325 TABLET, FILM COATED ORAL at 14:40

## 2024-08-07 RX ADMIN — ASPIRIN 81 MG: 81 TABLET, COATED ORAL at 08:06

## 2024-08-07 RX ADMIN — EZETIMIBE 10 MG: 10 TABLET ORAL at 08:06

## 2024-08-07 RX ADMIN — INSULIN LISPRO 4 UNITS: 100 INJECTION, SOLUTION INTRAVENOUS; SUBCUTANEOUS at 21:13

## 2024-08-07 RX ADMIN — CEFAZOLIN SODIUM 2000 MG: 2 SOLUTION INTRAVENOUS at 11:18

## 2024-08-07 RX ADMIN — AMLODIPINE BESYLATE 5 MG: 5 TABLET ORAL at 08:06

## 2024-08-07 RX ADMIN — INSULIN LISPRO 4 UNITS: 100 INJECTION, SOLUTION INTRAVENOUS; SUBCUTANEOUS at 08:09

## 2024-08-07 RX ADMIN — INSULIN LISPRO 4 UNITS: 100 INJECTION, SOLUTION INTRAVENOUS; SUBCUTANEOUS at 17:35

## 2024-08-07 RX ADMIN — OXYCODONE HYDROCHLORIDE 5 MG: 5 TABLET ORAL at 14:40

## 2024-08-07 RX ADMIN — ENOXAPARIN SODIUM 40 MG: 40 INJECTION SUBCUTANEOUS at 08:07

## 2024-08-07 RX ADMIN — CEFAZOLIN SODIUM 2000 MG: 2 SOLUTION INTRAVENOUS at 02:54

## 2024-08-07 RX ADMIN — INSULIN LISPRO 4 UNITS: 100 INJECTION, SOLUTION INTRAVENOUS; SUBCUTANEOUS at 11:22

## 2024-08-07 RX ADMIN — ATORVASTATIN CALCIUM 40 MG: 40 TABLET, FILM COATED ORAL at 17:28

## 2024-08-07 RX ADMIN — CEFAZOLIN SODIUM 2000 MG: 2 SOLUTION INTRAVENOUS at 17:28

## 2024-08-07 NOTE — PROGRESS NOTES
Patient:  RAINE KO    MRN:  [id number]    Aidin Request ID:  [id number]    Level of care reserved:  Infusion    Partner Reserved:  [facility], [place] [address] [phone]    Clinical needs requested:    Geography searched:  [phone]    Start of Service:    Request sent:  2:27pm EDT on 8/6/2024 by Whit Tatum    Partner reserved:  11:46am EDT on 8/7/2024 by Bonny Weaver    Choice list shared:

## 2024-08-07 NOTE — CASE MANAGEMENT
[facility] has received APPROVAL to release wound vac to patient.  Assigned vac #: [id number]     Proof of delivery PPW given to Care Manager: William Weaver to deliver to patient.     Please reach out to CM for updates on any clinical information.

## 2024-08-07 NOTE — ASSESSMENT & PLAN NOTE
Patient with a history of hypertension, diabetes, DVT and NSTEMI who recently had cardiac catheterization (7/26) 6 days ago presents with right groin swelling, fevers, decreased appetite and headaches.   Transferred to [facility] for a vascular surgery consultation.  S/p I&D in the OR, repeat planned for 8/7  Follow up cultures  Vancomycin adjusted to cefazolin due to MSSA blood culture positive  Patient pending repeat I/d due to ongoing significant purulence

## 2024-08-07 NOTE — CASE MANAGEMENT
[facility] has received request for [employer] wound vac from Care Manager.  Request submitted via [employer] website.   Pending order #: [id number]    Care Manager notified: William Weaver    Please reach out to CM for updates on any clinical information.

## 2024-08-07 NOTE — PROGRESS NOTES
Progress Note - Infectious Disease   Juan Carlos Cleveland Jr. 68 y.o. male MRN: [id number]  Unit/Bed#: [facility] 524-01 Encounter: [id number]      Impression/Plan:    1.  Fever.  Due to #2/3 below.  Fever improved with antibiotics and status post right groin I&D x2.  The patient is hemodynamically stable.  Repeat blood cultures without growth              -Continue IV cefazolin 2 g every 8 hours              -Follow-up blood cultures              -Vascular surgery follow-up ongoing               -Repeat CBC tomorrow to monitor treatment response              -Monitor fever curve     2. MSSA bacteremia.  Both sets admission blood cultures growing MSSA.  Due to infected right groin hematoma.  No intravascular prosthetic devices present. TTE no vegetations (adequate study).  Repeat blood cultures no growth.  PICC placed.              -IV cefazolin 2 g every 8 hours as above              -Follow-up repeat blood cultures   -Due to vascular involvement of infection, patient will require a 6-week course of IV cefazolin through 9/12/2024   -Weekly CBC with differential and creatinine on IV antibiotics   -Will arrange ID follow-up 2 weeks after discharge   -PICC removal after last dose IV antibiotics     3.  Infected right groin hematoma.  Following cardiac catheterization 7/26 with right femoral artery access.  Lower extremity arterial duplex showed a right groin hematoma, no pseudoaneurysm or AV fistula.  Status post right groin washout with vascular surgery 8/2/2024, tissue culture also growing MSSA.  Status post repeat washout 8/6, there was purulence exuding from the prior I&D site with tracking of the infection around the common femoral artery              -Antibiotics as above              -Vascular surgery follow-up ongoing              -Monitor right groin site     4.  Type 2 diabetes mellitus.  Hemoglobin A1c 8%.  Risk factor for infection.  Glycemic control per primary team.     I have discussed the above management plan  to continue IV cefazolin with the primary team.  Discussed with the patient at bedside.  ID will follow.    Antibiotics:  IV cefazolin  Abx day 7    Subjective:  The patient reports some pain in the right groin when he moves but at rest is feeling okay.  Nausea has resolved.  He has no fever or chills.  PICC line placed today.    Objective:  Vitals:  Temp:  [97.8 °F (36.6 °C)-98.2 °F (36.8 °C)] 98.2 °F (36.8 °C)  HR:  [57-68] 62  Resp:  [16-18] 18  BP: (139-185)/(60-82) 142/65  SpO2:  [95 %-98 %] 96 %  Temp (24hrs), Av °F (36.7 °C), Min:97.8 °F (36.6 °C), Max:98.2 °F (36.8 °C)  Current: Temperature: 98.2 °F (36.8 °C)    Physical Exam:   General Appearance:  Alert, interactive, nontoxic, no acute distress.   Throat: Oropharynx moist without lesions.    Lungs:   Clear to auscultation bilaterally; no wheezes, rhonchi or rales; respirations unlabored   Heart:  RRR; no murmur, rub or gallop   Abdomen:   Soft, non-tender, non-distended, positive bowel sounds.     Extremities: No clubbing, cyanosis or edema.  Right upper extremity PICC   Skin: Right groin wound VAC in place with surrounding erythema       Labs:   All pertinent labs and imaging studies were personally reviewed  Results from last 7 days   Lab Units 24  0443 24  0527 24  0544   WBC Thousand/uL 12.30* 7.65 8.43   HEMOGLOBIN g/dL 10.2* 10.5* 11.6*   PLATELETS Thousands/uL 489* 464* 460*     Results from last 7 days   Lab Units 24  0443 24  0527 24  0544 24  0517 24  0452   SODIUM mmol/L 137 138 137   < > 134*   POTASSIUM mmol/L 4.1 3.7 3.7   < > 3.9   CHLORIDE mmol/L 102 104 102   < > 103   CO2 mmol/L 23 26 24   < > 22   BUN mg/dL 21 18 19   < > 30*   CREATININE mg/dL 0.85 0.72 0.90   < > 1.11   EGFR ml/min/1.73sq m 89 95 87   < > 67   CALCIUM mg/dL 8.4 8.6 8.8   < > 8.6   AST U/L  --   --   --   --  23   ALT U/L  --   --   --   --  22   ALK PHOS U/L  --   --   --   --  100    < > = values in this interval not  displayed.     Results from last 7 days   Lab Units 08/01/24  1026   PROCALCITONIN ng/ml 0.15                   Micro:  Results from last 7 days   Lab Units 08/02/24  1930 08/02/24  1916 08/02/24  1634 08/01/24  1015   BLOOD CULTURE  No Growth After 4 Days. No Growth After 4 Days.  --  Staphylococcus aureus*  Staphylococcus aureus*   GRAM STAIN RESULT   --   --  2+ Disintegrating polys*  2+ Gram positive cocci in clusters* Gram positive cocci in clusters*  Gram positive cocci in clusters*       Imaging:  I have personally reviewed pertinent imaging study reports and images in PACS.  Lower extremity arterial duplex: Right groin hematoma, no evidence of pseudoaneurysm or arteriovenous fistula

## 2024-08-07 NOTE — PLAN OF CARE
Problem: PAIN - ADULT  Goal: Verbalizes/displays adequate comfort level or baseline comfort level  Description: Interventions:  - Encourage patient to monitor pain and request assistance  - Assess pain using appropriate pain scale  - Administer analgesics based on type and severity of pain and evaluate response  - Implement non-pharmacological measures as appropriate and evaluate response  - Consider cultural and social influences on pain and pain management  - Notify physician/advanced practitioner if interventions unsuccessful or patient reports new pain  8/6/2024 2059 by Mari Cruz RN  Outcome: Progressing  8/6/2024 2055 by Mari Cruz RN  Outcome: Progressing     Problem: INFECTION - ADULT  Goal: Absence or prevention of progression during hospitalization  Description: INTERVENTIONS:  - Assess and monitor for signs and symptoms of infection  - Monitor lab/diagnostic results  - Monitor all insertion sites, i.e. indwelling lines, tubes, and drains  - Monitor endotracheal if appropriate and nasal secretions for changes in amount and color  - [place] appropriate cooling/warming therapies per order  - Administer medications as ordered  - Instruct and encourage patient and family to use good hand hygiene technique  - Identify and instruct in appropriate isolation precautions for identified infection/condition  8/6/2024 2059 by Mari Cruz RN  Outcome: Progressing  8/6/2024 2055 by Mari Cruz RN  Outcome: Progressing  Goal: Absence of fever/infection during neutropenic period  Description: INTERVENTIONS:  - Monitor WBC    8/6/2024 2059 by Mari Cruz RN  Outcome: Progressing  8/6/2024 2055 by Mari Cruz RN  Outcome: Progressing     Problem: SAFETY ADULT  Goal: Patient will remain free of falls  Description: INTERVENTIONS:  - Educate patient/family on patient safety including physical limitations  - Instruct patient to call for assistance with activity   - Consult  OT/PT to assist with strengthening/mobility   - Keep Call bell within reach  - Keep bed low and locked with side rails adjusted as appropriate  - Keep care items and personal belongings within reach  - Initiate and maintain comfort rounds  - Make Fall Risk Sign visible to staff  - Offer Toileting every  Hours, in advance of need  - Initiate/Maintain alarm  - Obtain necessary fall risk management equipment:   - Apply yellow socks and bracelet for high fall risk patients  - Consider moving patient to room near nurses station  8/6/2024 2059 by Mari Cruz RN  Outcome: Progressing  8/6/2024 2055 by Mari Cruz RN  Outcome: Progressing  Goal: Maintain or return to baseline ADL function  Description: INTERVENTIONS:  -  Assess patient's ability to carry out ADLs; assess patient's baseline for ADL function and identify physical deficits which impact ability to perform ADLs (bathing, care of mouth/teeth, toileting, grooming, dressing, etc.)  - Assess/evaluate cause of self-care deficits   - Assess range of motion  - Assess patient's mobility; develop plan if impaired  - Assess patient's need for assistive devices and provide as appropriate  - Encourage maximum independence but intervene and supervise when necessary  - Involve family in performance of ADLs  - Assess for home care needs following discharge   - Consider OT consult to assist with ADL evaluation and planning for discharge  - Provide patient education as appropriate  8/6/2024 2059 by Mari Cruz RN  Outcome: Progressing  8/6/2024 2055 by Mari Cruz RN  Outcome: Progressing  Goal: Maintains/Returns to pre admission functional level  Description: INTERVENTIONS:  - Perform AM-PAC 6 Click Basic Mobility/ Daily Activity assessment daily.  - Set and communicate daily mobility goal to care team and patient/family/caregiver.   - Collaborate with rehabilitation services on mobility goals if consulted  - Perform Range of Motion  times a  day.  - Reposition patient every  hours.  - Dangle patient  times a day  - Stand patient  times a day  - Ambulate patient  times a day  - Out of bed to chair  times a day   - Out of bed for meals times a day  - Out of bed for toileting  - Record patient progress and toleration of activity level   8/6/2024 2059 by Mari Cruz RN  Outcome: Progressing  8/6/2024 2055 by Mari Cruz RN  Outcome: Progressing     Problem: DISCHARGE PLANNING  Goal: Discharge to home or other facility with appropriate resources  Description: INTERVENTIONS:  - Identify barriers to discharge w/patient and caregiver  - Arrange for needed discharge resources and transportation as appropriate  - Identify discharge learning needs (meds, wound care, etc.)  - Arrange for interpretive services to assist at discharge as needed  - Refer to Case Management Department for coordinating discharge planning if the patient needs post-hospital services based on physician/advanced practitioner order or complex needs related to functional status, cognitive ability, or social support system  8/6/2024 2059 by Mari Cruz RN  Outcome: Progressing  8/6/2024 2055 by Mari Crzu RN  Outcome: Progressing     Problem: Knowledge Deficit  Goal: Patient/family/caregiver demonstrates understanding of disease process, treatment plan, medications, and discharge instructions  Description: Complete learning assessment and assess knowledge base.  Interventions:  - Provide teaching at level of understanding  - Provide teaching via preferred learning methods  8/6/2024 2059 by Mari Cruz RN  Outcome: Progressing  8/6/2024 2055 by Mari Cruz RN  Outcome: Progressing

## 2024-08-07 NOTE — PROGRESS NOTES
[facility]  Progress Note  Name: Juan Carlos Cunningham I  MRN: [id number]  Unit/Bed#: [facility] 524-01 I Date of Admission: 8/2/2024   Date of Service: 8/7/2024 I Hospital Day: 5    Assessment & Plan   Sepsis (HCC)  Assessment & Plan  Patient with PMH of hypertension, diabetes, DVT, NSTEMI  who recently had Cardiac cath (Status postcardiac cath revealing no significant CAD to suggest NSTEMI per cardiology) done on 7/26 (6 days ago) presented with a 5-day history of fever, 1 day history of red, warm, tender lump in groin.           Lab Results   Component Value Date     WBC 13.38 (H) 08/02/2024     HGB 10.7 (L) 08/02/2024     HCT 32.2 (L) 08/02/2024     MCV 92 08/02/2024      08/02/2024      Plan  Monitor vitals  Continue cefepime(2g/50ml dextrose), vancomycin (2000mg)  Blood cultures positive for GPC is clusters, BCID is staph sedrick  Repeat blood cultures post op to confirm resolution of bacteremia  Follow up surgical culture results  Consult infectious disease  Patient antibiotics adjusted from cefepime vancomycin to cefazolin due to MSSA 2 out of 2 and blood cultures, echo ordered-negative for vegetations  Repeat culture negative-if still clear tomorrow can place picc per ID    Class 1 obesity in adult  Assessment & Plan  Affects all aspects of his care  Turn q2h  Weight loss counseling when stable as an outpatient    Hypertension  Assessment & Plan  On home medication of lisinopril hydrochlorothiazide.  Blood pressure is well-controlled at this time.  Given elevated creatinine, hold off on lisinopril hydrochlorothiazide.  In the interim, start amlodipine 5 mg daily.  Monitor vitals, adjust dosing as needed.    Type 2 diabetes mellitus (HCC)  Assessment & Plan  Lab Results   Component Value Date    HGBA1C 8.0 (H) 07/26/2024       Recent Labs     08/06/24 2037 08/07/24  0617 08/07/24  1031 08/07/24  1542   POCGLU 245* 214* 247* 204*         Blood Sugar Average: Last 72  hrs:  (P) 211.25Home medications include pioglitazone metformin combination.  Hold home medication during hospitalization in light of elevated creatinine.  Monitor blood glucose, insulin per sliding scale [facility]      * Hematoma  Assessment & Plan  Patient with a history of hypertension, diabetes, DVT and NSTEMI who recently had cardiac catheterization (7/26) 6 days ago presents with right groin swelling, fevers, decreased appetite and headaches.   Transferred to [facility] for a vascular surgery consultation.  S/p I&D in the OR, repeat planned for 8/7  Follow up cultures  Vancomycin adjusted to cefazolin due to MSSA blood culture positive  Patient pending repeat I/d due to ongoing significant purulence           VTE Pharmacologic Prophylaxis:   Moderate Risk (Score 3-4) - Pharmacological DVT Prophylaxis Ordered: enoxaparin (Lovenox).    Mobility:   Basic Mobility Inpatient Raw Score: 24  JH-HLM Goal: 8: Walk 250 feet or more  JH-HLM Achieved: 8: Walk 250 feet ot more  JH-HLM Goal achieved. Continue to encourage appropriate mobility.    Patient Centered Rounds: I performed bedside rounds with nursing staff today.   Discussions with Specialists or Other Care Team Provider: N/A    Education and Discussions with Family / Patient: Updated  (wife) via phone.    Total Time Spent on Date of Encounter in care of patient: 45 mins. This time was spent on one or more of the following: performing physical exam; counseling and coordination of care; obtaining or reviewing history; documenting in the medical record; reviewing/ordering tests, medications or procedures; communicating with other healthcare professionals and discussing with patient's family/caregivers.    Current Length of Stay: 5 day(s)  Current Patient Status: Inpatient   Certification Statement: The patient will continue to require additional inpatient hospital stay due to Wound vac exchange  Discharge Plan: Anticipate discharge in 24-48 hrs to home  with home services.    Code Status: Level 1 - Full Code    Subjective:   Seen and examined at bedside this morning. Patient resting comfortably. No new complaints    Objective:     Vitals:   Temp (24hrs), Av °F (36.7 °C), Min:97.8 °F (36.6 °C), Max:98.2 °F (36.8 °C)    Temp:  [97.8 °F (36.6 °C)-98.2 °F (36.8 °C)] 98.2 °F (36.8 °C)  HR:  [57-68] 62  Resp:  [16-18] 18  BP: (139-185)/(60-82) 142/65  SpO2:  [95 %-98 %] 96 %  Body mass index is 34.45 kg/m².     Input and Output Summary (last 24 hours):     Intake/Output Summary (Last 24 hours) at 2024 1617  Last data filed at 2024 0255  Gross per 24 hour   Intake 394.17 ml   Output 300 ml   Net 94.17 ml       Physical Exam:   Physical Exam  Vitals and nursing note reviewed.   Constitutional:       General: He is not in acute distress.     Appearance: He is well-developed. He is not toxic-appearing or diaphoretic.   HENT:      Head: Normocephalic and atraumatic.   Eyes:      General: No scleral icterus.     Conjunctiva/sclera: Conjunctivae normal.   Cardiovascular:      Rate and Rhythm: Normal rate and regular rhythm.      Heart sounds: No murmur heard.     No friction rub. No gallop.   Pulmonary:      Effort: Pulmonary effort is normal. No respiratory distress.      Breath sounds: Normal breath sounds. No stridor. No wheezing, rhonchi or rales.   Chest:      Chest wall: No tenderness.   Abdominal:      General: There is no distension.      Palpations: Abdomen is soft. There is no mass.      Tenderness: There is no abdominal tenderness. There is no guarding or rebound.      Hernia: No hernia is present.   Musculoskeletal:         General: No swelling or tenderness.      Cervical back: Neck supple.      Comments: Right groin dressing c/d/i   Skin:     General: Skin is warm and dry.      Capillary Refill: Capillary refill takes less than 2 seconds.   Neurological:      Mental Status: He is alert and oriented to person, place, and time.   Psychiatric:          Mood and Affect: Mood normal.          Additional Data:     Labs:  Results from last 7 days   Lab Units 08/07/24  0443 08/06/24  0527 08/05/24  0544   WBC Thousand/uL 12.30*   < > 8.43   HEMOGLOBIN g/dL 10.2*   < > 11.6*   HEMATOCRIT % 31.0*   < > 35.7*   PLATELETS Thousands/uL 489*   < > 460*   SEGS PCT %  --   --  78*   LYMPHO PCT %  --   --  10*   MONO PCT %  --   --  7   EOS PCT %  --   --  3    < > = values in this interval not displayed.     Results from last 7 days   Lab Units 08/07/24  0443 08/03/24  0517 08/02/24  0452   SODIUM mmol/L 137   < > 134*   POTASSIUM mmol/L 4.1   < > 3.9   CHLORIDE mmol/L 102   < > 103   CO2 mmol/L 23   < > 22   BUN mg/dL 21   < > 30*   CREATININE mg/dL 0.85   < > 1.11   ANION GAP mmol/L 12   < > 9   CALCIUM mg/dL 8.4   < > 8.6   ALBUMIN g/dL  --   --  3.3*   TOTAL BILIRUBIN mg/dL  --   --  0.65   ALK PHOS U/L  --   --  100   ALT U/L  --   --  22   AST U/L  --   --  23   GLUCOSE RANDOM mg/dL 206*   < > 162*    < > = values in this interval not displayed.         Results from last 7 days   Lab Units 08/07/24  1542 08/07/24  1031 08/07/24  0617 08/06/24  2037 08/06/24  1631 08/06/24  1444 08/06/24  1033 08/06/24  0620 08/05/24  2040 08/05/24  1540 08/05/24  1035 08/05/24  0645   POC GLUCOSE mg/dl 204* 247* 214* 245* 203* 163* 184* 173* 222* 235* 229* 190*         Results from last 7 days   Lab Units 08/01/24  1026   LACTIC ACID mmol/L 2.0   PROCALCITONIN ng/ml 0.15       Lines/Drains:  Invasive Devices       Peripherally Inserted Central Catheter Line  Duration             PICC Line 08/07/24 Right Basilic <1 day              Peripheral Intravenous Line  Duration             Peripheral IV 08/05/24 Left;Proximal;Ventral (anterior) Forearm 2 days                    Central Line:  Goal for removal: N/A - Discharging with PICC for IV ABX/medications             Imaging: Reviewed radiology reports from this admission including: chest xray    Recent Cultures (last 7 days):   Results from  last 7 days   Lab Units 08/02/24  1930 08/02/24  1916 08/02/24  1634 08/01/24  1015   BLOOD CULTURE  No Growth After 4 Days. No Growth After 4 Days.  --  Staphylococcus aureus*  Staphylococcus aureus*   GRAM STAIN RESULT   --   --  2+ Disintegrating polys*  2+ Gram positive cocci in clusters* Gram positive cocci in clusters*  Gram positive cocci in clusters*       Last 24 Hours Medication List:   Current Facility-Administered Medications   Medication Dose Route Frequency Provider Last Rate    acetaminophen  975 mg Oral Q8H [facility] Iván Bliss MD      amLODIPine  5 mg Oral Daily Iván Bliss MD      aspirin  81 mg Oral Daily Iván Bliss MD      atorvastatin  40 mg Oral Daily With Dinner Iván Bliss MD      cefazolin  2,000 mg Intravenous Q8H Iván Blsis MD 2,000 mg (08/07/24 1118)    enoxaparin  40 mg Subcutaneous Daily Iván Bliss MD      ezetimibe  10 mg Oral Daily Iván Bliss MD      insulin lispro  2-12 Units Subcutaneous 4x Daily (AC & HS) Iván Bliss MD      ondansetron  4 mg Intravenous Q4H PRN Chadd Smtih      oxyCODONE  5 mg Oral Q4H PRN Iván Bliss MD      traMADol  50 mg Oral Q6H PRN Iván Bliss MD          Today, Patient Was Seen By: Chadd Smith    **Please Note: This note may have been constructed using a voice recognition system.**

## 2024-08-07 NOTE — ASSESSMENT & PLAN NOTE
Lab Results   Component Value Date    HGBA1C 8.0 (H) 07/26/2024       Recent Labs     08/06/24  2037 08/07/24  0617 08/07/24  1031 08/07/24  1542   POCGLU 245* 214* 247* 204*         Blood Sugar Average: Last 72 hrs:  (P) 211.25Home medications include pioglitazone metformin combination.  Hold home medication during hospitalization in light of elevated creatinine.  Monitor blood glucose, insulin per sliding scale [facility]

## 2024-08-07 NOTE — CASE MANAGEMENT
Case Management Discharge Planning Note    Patient name Juan Carlos Cleveland Jr.  Location [facility] 524/[facility] 524-01 MRN [id number]  : 1955 Date 2024       Current Admission Date: 2024  Current Admission Diagnosis:Hematoma   Patient Active Problem List    Diagnosis Date Noted Date Diagnosed    Sepsis (HCC) 2024     Fever 2024     Hematoma 2024     Elevated serum creatinine 2024     Type 2 diabetes mellitus (HCC) 2024     Hypertension 2024     Class 1 obesity in adult 2024     Chronic bilateral low back pain without sciatica 2024     Cardiac murmur 2024     Other constipation 10/10/2023     BRBPR (bright red blood per rectum) 10/10/2023     Dysphagia 10/10/2023     Blood in stool 2020     History of DVT (deep vein thrombosis) 2020     Localized swelling, mass and lump, right lower limb 2020     Chest pain 2018       LOS (days): 5  Geometric Mean LOS (GMLOS) (days): 4.4  Days to GMLOS:-0.5     OBJECTIVE:  Risk of Unplanned Readmission Score: 13.17         Current admission status: Inpatient   Preferred Pharmacy:   [id number] - [place] - [address]  [address] [address]  Phone: [phone] Fax: [phone]    Primary Care Provider: Todd Nolasco DO    Primary Insurance: MEDICARE  Secondary Insurance: BLUE CROSS    DISCHARGE DETAILS:        Additional Comments: MAX was informed that the [employer] wound vac order form page 1 needed to be re-done due to white out being used.  LUIS Bashir was sent a message via Secure Chat and was informed of the situation.  New [employer] Wound Vac form page 1 was completed and emailed to Karen SANTANA DC Support to expedite [employer] order.  Karen informed MAX the Vac was released.  CM got Wound Vac [id number] from CM office and placed in patient's room for DC needs.  A Secure Chat was sent to BHAVESH Dangelo regarding wound vac in room.  CM to be available     2pm  Via Secure  Chat Dr Carpenter informed CM that a new prescription for IV ABX was in the patient's chart.  The new IV ABX prescription was uploaded into [email or web address] and a message was sent to [facility] regarding the change.  CM to be available      2:55  Completed [employer] form was [facility]ws to [facility] [phone] and to  DC Support Karen for DC needs.

## 2024-08-07 NOTE — PROCEDURES
Insert Complex Venous Access Line    Date/Time: 8/7/2024 9:50 AM    Performed by: Rand Jameson RN  Authorized by: Iván Bliss MD    Patient location:  Bedside  Other Assisting Provider: Yes (comment) (óscar)    Consent:     Consent obtained:  Written    Consent given by:  Patient    Risks discussed:  Arterial puncture, bleeding, infection, incorrect placement, pneumothorax and nerve damage    Alternatives discussed:  No treatment and delayed treatment  Universal protocol:     Procedure explained and questions answered to patient or proxy's satisfaction: yes      Immediately prior to procedure, a time out was called: yes      Site/side marked: yes      Patient identity confirmed:  Verbally with patient, arm band, provided demographic data and hospital-assigned identification number  Pre-procedure details:     Hand hygiene: Hand hygiene performed prior to insertion      Sterile barrier technique: All elements of maximal sterile technique followed      Skin preparation:  ChloraPrep    Skin preparation agent: Skin preparation agent completely dried prior to procedure    Procedure details:     Complex Venous Access Line Type: PICC      Complex Venous Access Line Indications: long term antibiotics      Catheter tip vessel location: atriocaval junction      Orientation:  Right    Location:  Basilic    Procedural supplies:  Single lumen    Catheter size:  4 Fr ([id number] ex 9-30-25)    Total catheter length (cm):  46    Catheter out on skin (cm):  0 hubbed    Max flow rate:  999    Arm circumference:  34    Patient evaluated for contraindications to access (i.e. fistula, thrombosis, etc): Yes      Site selection rationale:  Largest most available vein    Approach: percutaneous technique used      Patient position:  Flat    Ultrasound image availability:  Not saved    Sterile ultrasound techniques: Sterile gel and sterile probe covers were used      Number of attempts:  1    Successful placement: yes      Landmarks  identified: yes      Vessel of catheter tip end:  Sherlock 3CG confirmed  Anesthesia (see MAR for exact dosages):     Anesthesia method:  Local infiltration    Local anesthetic:  Lidocaine 2% w/o epi (2 mls)  Post-procedure details:     Post-procedure:  Dressing applied and securement device placed    Assessment:  Blood return through all ports and free fluid flow    Post-procedure complications: none      Patient tolerance of procedure:  Tolerated well, no immediate complications

## 2024-08-07 NOTE — PROGRESS NOTES
Progress Note - Vascular Surgery   Juan Carlos Cleveland Jr. 68 y.o. male MRN: [id number]  Unit/Bed#: [place] 524-01 Encounter: [id number]    Assessment:  Juan Carlos Cleveland Jr. is a 68 y.o. male w/ hx of HTN, DM, DVT, CAD s/p cardiac cath via R-fem access c/b right groin hematoma now presenting w/ bacteremia and right groin hematoma infection.      7/26- S/p cardiac cathetertization with femoral access      8/2 - s/p right groin incision, drainage, and exploration     8/6-  s/p right groin incision, drainage, and exploration      Plan:  Right groin infected hematoma   Wound vac left in place today. Plan to change tomorrow on 8/8  Bacteremia w/ MSSA, tissue cx w/ staph aureus   ID note appreciated. Anticipate at least a 4-week course of IV antibiotics with PICC line  Continue Ancef for now  Continue aspirin and statin therapy   Glucose control   PRN pain control   Encourage ambulation  Incentive spirometry 10 times/hour while awake  Type 2 diabetes mellitus.  Hemoglobin A1c 8%. Glycemic control per primary team.  Remainder of care per primary team      Subjective/Objective   No acute events overnight. Pain is well controlled. Has been ambulating without significant issue. Does report nausea overnight, but states has resolved by now. Denies fever, chills, SOB, M/S changes.    Objective:     Blood pressure 141/70, pulse 60, temperature 98.1 °F (36.7 °C), temperature source Oral, resp. rate 18, height 6' (1.829 m), weight 115 kg (254 lb), SpO2 98%.,Body mass index is 34.45 kg/m².      Intake/Output Summary (Last 24 hours) at 8/7/2024 0649  Last data filed at 8/7/2024 0255  Gross per 24 hour   Intake 994.17 ml   Output 315 ml   Net 679.17 ml       Invasive Devices       Peripheral Intravenous Line  Duration             Peripheral IV 08/05/24 Left;Proximal;Ventral (anterior) Forearm 2 days                    Physical Exam:   GEN: NAD  HEENT: NCAT, MMM  CV: Regular rate, normotensive  Lung: Normal effort, saturating well  Ab: Soft,  NT/ND  Extrem: Wound vac in place to right groin. Machine running at 125 mmHg. Scant drainage in canister. No surrounding erythema.  Vasc: 2+ DP, PT b/l  Neuro: A+Ox3     Recent Results (from the past 36 hour(s))   Fingerstick Glucose (POCT)    Collection Time: 08/05/24  8:40 PM   Result Value Ref Range    POC Glucose 222 (H) 65 - 140 mg/dl   CBC    Collection Time: 08/06/24  5:27 AM   Result Value Ref Range    WBC 7.65 4.31 - 10.16 Thousand/uL    RBC 3.52 (L) 3.88 - 5.62 Million/uL    Hemoglobin 10.5 (L) 12.0 - 17.0 g/dL    Hematocrit 31.8 (L) 36.5 - 49.3 %    MCV 90 82 - 98 fL    MCH 29.8 26.8 - 34.3 pg    MCHC 33.0 31.4 - 37.4 g/dL    RDW 14.0 11.6 - 15.1 %    Platelets 464 (H) 149 - 390 Thousands/uL    MPV 9.0 8.9 - 12.7 fL   Basic metabolic panel    Collection Time: 08/06/24  5:27 AM   Result Value Ref Range    Sodium 138 135 - 147 mmol/L    Potassium 3.7 3.5 - 5.3 mmol/L    Chloride 104 96 - 108 mmol/L    CO2 26 21 - 32 mmol/L    ANION GAP 8 4 - 13 mmol/L    BUN 18 5 - 25 mg/dL    Creatinine 0.72 0.60 - 1.30 mg/dL    Glucose 152 (H) 65 - 140 mg/dL    Calcium 8.6 8.4 - 10.2 mg/dL    eGFR 95 ml/min/1.73sq m   Type and screen    Collection Time: 08/06/24  5:27 AM   Result Value Ref Range    ABO Grouping A     Rh Factor Positive     Antibody Screen Negative     Specimen Expiration Date [id number]    Fingerstick Glucose (POCT)    Collection Time: 08/06/24  6:20 AM   Result Value Ref Range    POC Glucose 173 (H) 65 - 140 mg/dl   Fingerstick Glucose (POCT)    Collection Time: 08/06/24 10:33 AM   Result Value Ref Range    POC Glucose 184 (H) 65 - 140 mg/dl   Fingerstick Glucose (POCT)    Collection Time: 08/06/24  2:44 PM   Result Value Ref Range    POC Glucose 163 (H) 65 - 140 mg/dl   Fingerstick Glucose (POCT)    Collection Time: 08/06/24  4:31 PM   Result Value Ref Range    POC Glucose 203 (H) 65 - 140 mg/dl   Fingerstick Glucose (POCT)    Collection Time: 08/06/24  8:37 PM   Result Value Ref Range    POC Glucose 245  (H) 65 - 140 mg/dl   CBC    Collection Time: 08/07/24  4:43 AM   Result Value Ref Range    WBC 12.30 (H) 4.31 - 10.16 Thousand/uL    RBC 3.42 (L) 3.88 - 5.62 Million/uL    Hemoglobin 10.2 (L) 12.0 - 17.0 g/dL    Hematocrit 31.0 (L) 36.5 - 49.3 %    MCV 91 82 - 98 fL    MCH 29.8 26.8 - 34.3 pg    MCHC 32.9 31.4 - 37.4 g/dL    RDW 13.8 11.6 - 15.1 %    Platelets 489 (H) 149 - 390 Thousands/uL    MPV 8.9 8.9 - 12.7 fL   Basic metabolic panel    Collection Time: 08/07/24  4:43 AM   Result Value Ref Range    Sodium 137 135 - 147 mmol/L    Potassium 4.1 3.5 - 5.3 mmol/L    Chloride 102 96 - 108 mmol/L    CO2 23 21 - 32 mmol/L    ANION GAP 12 4 - 13 mmol/L    BUN 21 5 - 25 mg/dL    Creatinine 0.85 0.60 - 1.30 mg/dL    Glucose 206 (H) 65 - 140 mg/dL    Calcium 8.4 8.4 - 10.2 mg/dL    eGFR 89 ml/min/1.73sq m   Fingerstick Glucose (POCT)    Collection Time: 08/07/24  6:17 AM   Result Value Ref Range    POC Glucose 214 (H) 65 - 140 mg/dl

## 2024-08-07 NOTE — PLAN OF CARE
Problem: PAIN - ADULT  Goal: Verbalizes/displays adequate comfort level or baseline comfort level  Description: Interventions:  - Encourage patient to monitor pain and request assistance  - Assess pain using appropriate pain scale  - Administer analgesics based on type and severity of pain and evaluate response  - Implement non-pharmacological measures as appropriate and evaluate response  - Consider cultural and social influences on pain and pain management  - Notify physician/advanced practitioner if interventions unsuccessful or patient reports new pain  Outcome: Progressing     Problem: INFECTION - ADULT  Goal: Absence or prevention of progression during hospitalization  Description: INTERVENTIONS:  - Assess and monitor for signs and symptoms of infection  - Monitor lab/diagnostic results  - Monitor all insertion sites, i.e. indwelling lines, tubes, and drains  - Monitor endotracheal if appropriate and nasal secretions for changes in amount and color  - Roland appropriate cooling/warming therapies per order  - Administer medications as ordered  - Instruct and encourage patient and family to use good hand hygiene technique  - Identify and instruct in appropriate isolation precautions for identified infection/condition  Outcome: Progressing  Goal: Absence of fever/infection during neutropenic period  Description: INTERVENTIONS:  - Monitor WBC    Outcome: Progressing     Problem: SAFETY ADULT  Goal: Patient will remain free of falls  Description: INTERVENTIONS:  - Educate patient/family on patient safety including physical limitations  - Instruct patient to call for assistance with activity   - Consult OT/PT to assist with strengthening/mobility   - Keep Call bell within reach  - Keep bed low and locked with side rails adjusted as appropriate  - Keep care items and personal belongings within reach  - Initiate and maintain comfort rounds  - Make Fall Risk Sign visible to staff  - Offer Toileting every  Hours,  in advance of need  - Initiate/Maintain alarm  - Obtain necessary fall risk management equipment:   - Apply yellow socks and bracelet for high fall risk patients  - Consider moving patient to room near nurses station  Outcome: Progressing  Goal: Maintain or return to baseline ADL function  Description: INTERVENTIONS:  -  Assess patient's ability to carry out ADLs; assess patient's baseline for ADL function and identify physical deficits which impact ability to perform ADLs (bathing, care of mouth/teeth, toileting, grooming, dressing, etc.)  - Assess/evaluate cause of self-care deficits   - Assess range of motion  - Assess patient's mobility; develop plan if impaired  - Assess patient's need for assistive devices and provide as appropriate  - Encourage maximum independence but intervene and supervise when necessary  - Involve family in performance of ADLs  - Assess for home care needs following discharge   - Consider OT consult to assist with ADL evaluation and planning for discharge  - Provide patient education as appropriate  Outcome: Progressing  Goal: Maintains/Returns to pre admission functional level  Description: INTERVENTIONS:  - Perform AM-PAC 6 Click Basic Mobility/ Daily Activity assessment daily.  - Set and communicate daily mobility goal to care team and patient/family/caregiver.   - Collaborate with rehabilitation services on mobility goals if consulted  - Perform Range of Motion  times a day.  - Reposition patient every  hours.  - Dangle patient  times a day  - Stand patient  times a day  - Ambulate patient  times a day  - Out of bed to chair  times a day   - Out of bed for meals  times a day  - Out of bed for toileting  - Record patient progress and toleration of activity level   Outcome: Progressing     Problem: DISCHARGE PLANNING  Goal: Discharge to home or other facility with appropriate resources  Description: INTERVENTIONS:  - Identify barriers to discharge w/patient and caregiver  - Arrange for  needed discharge resources and transportation as appropriate  - Identify discharge learning needs (meds, wound care, etc.)  - Arrange for interpretive services to assist at discharge as needed  - Refer to Case Management Department for coordinating discharge planning if the patient needs post-hospital services based on physician/advanced practitioner order or complex needs related to functional status, cognitive ability, or social support system  Outcome: Progressing     Problem: Knowledge Deficit  Goal: Patient/family/caregiver demonstrates understanding of disease process, treatment plan, medications, and discharge instructions  Description: Complete learning assessment and assess knowledge base.  Interventions:  - Provide teaching at level of understanding  - Provide teaching via preferred learning methods  Outcome: Progressing

## 2024-08-08 ENCOUNTER — DOCUMENTATION (OUTPATIENT)
Dept: VASCULAR SURGERY | Facility: CLINIC | Age: 69
End: 2024-08-08

## 2024-08-08 LAB
ERYTHROCYTE [DISTWIDTH] IN BLOOD BY AUTOMATED COUNT: 14.2 % (ref 11.6–15.1)
GLUCOSE SERPL-MCNC: 153 MG/DL (ref 65–140)
GLUCOSE SERPL-MCNC: 201 MG/DL (ref 65–140)
GLUCOSE SERPL-MCNC: 221 MG/DL (ref 65–140)
GLUCOSE SERPL-MCNC: 239 MG/DL (ref 65–140)
HCT VFR BLD AUTO: 31.3 % (ref 36.5–49.3)
HGB BLD-MCNC: 10.4 G/DL (ref 12–17)
MCH RBC QN AUTO: 30.9 PG (ref 26.8–34.3)
MCHC RBC AUTO-ENTMCNC: 33.2 G/DL (ref 31.4–37.4)
MCV RBC AUTO: 93 FL (ref 82–98)
PLATELET # BLD AUTO: 463 THOUSANDS/UL (ref 149–390)
PMV BLD AUTO: 8.6 FL (ref 8.9–12.7)
RBC # BLD AUTO: 3.37 MILLION/UL (ref 3.88–5.62)
WBC # BLD AUTO: 9.43 THOUSAND/UL (ref 4.31–10.16)

## 2024-08-08 PROCEDURE — 82948 REAGENT STRIP/BLOOD GLUCOSE: CPT

## 2024-08-08 PROCEDURE — 85027 COMPLETE CBC AUTOMATED: CPT | Performed by: PHYSICIAN ASSISTANT

## 2024-08-08 PROCEDURE — 99232 SBSQ HOSP IP/OBS MODERATE 35: CPT | Performed by: STUDENT IN AN ORGANIZED HEALTH CARE EDUCATION/TRAINING PROGRAM

## 2024-08-08 RX ADMIN — CEFAZOLIN SODIUM 2000 MG: 2 SOLUTION INTRAVENOUS at 08:56

## 2024-08-08 RX ADMIN — EZETIMIBE 10 MG: 10 TABLET ORAL at 08:49

## 2024-08-08 RX ADMIN — CEFAZOLIN SODIUM 2000 MG: 2 SOLUTION INTRAVENOUS at 01:36

## 2024-08-08 RX ADMIN — CEFAZOLIN SODIUM 2000 MG: 2 SOLUTION INTRAVENOUS at 21:01

## 2024-08-08 RX ADMIN — ACETAMINOPHEN 975 MG: 325 TABLET, FILM COATED ORAL at 13:34

## 2024-08-08 RX ADMIN — INSULIN LISPRO 4 UNITS: 100 INJECTION, SOLUTION INTRAVENOUS; SUBCUTANEOUS at 11:43

## 2024-08-08 RX ADMIN — AMLODIPINE BESYLATE 5 MG: 5 TABLET ORAL at 08:49

## 2024-08-08 RX ADMIN — ATORVASTATIN CALCIUM 40 MG: 40 TABLET, FILM COATED ORAL at 17:32

## 2024-08-08 RX ADMIN — CEFAZOLIN SODIUM 2000 MG: 2 SOLUTION INTRAVENOUS at 13:37

## 2024-08-08 RX ADMIN — ENOXAPARIN SODIUM 40 MG: 40 INJECTION SUBCUTANEOUS at 08:49

## 2024-08-08 RX ADMIN — INSULIN LISPRO 4 UNITS: 100 INJECTION, SOLUTION INTRAVENOUS; SUBCUTANEOUS at 21:05

## 2024-08-08 RX ADMIN — ACETAMINOPHEN 975 MG: 325 TABLET, FILM COATED ORAL at 07:00

## 2024-08-08 RX ADMIN — INSULIN LISPRO 4 UNITS: 100 INJECTION, SOLUTION INTRAVENOUS; SUBCUTANEOUS at 17:33

## 2024-08-08 RX ADMIN — INSULIN LISPRO 2 UNITS: 100 INJECTION, SOLUTION INTRAVENOUS; SUBCUTANEOUS at 07:00

## 2024-08-08 RX ADMIN — ASPIRIN 81 MG: 81 TABLET, COATED ORAL at 08:49

## 2024-08-08 NOTE — ASSESSMENT & PLAN NOTE
Patient with PMH of hypertension, diabetes, DVT, NSTEMI  who recently had Cardiac cath (Status postcardiac cath revealing no significant CAD to suggest NSTEMI per cardiology) done on 7/26 (6 days ago) presented with a 5-day history of fever, 1 day history of red, warm, tender lump in groin.           Lab Results   Component Value Date     WBC 13.38 (H) 08/02/2024     HGB 10.7 (L) 08/02/2024     HCT 32.2 (L) 08/02/2024     MCV 92 08/02/2024      08/02/2024      Plan  Monitor vitals  Continue cefepime(2g/50ml dextrose), vancomycin (2000mg)  Blood cultures positive for GPC is clusters, BCID is staph sedrick  Repeat blood cultures post op to confirm resolution of bacteremia  Follow up surgical culture results  Consult infectious disease  Patient antibiotics adjusted from cefepime vancomycin to cefazolin due to MSSA 2 out of 2 and blood cultures, echo ordered-negative for vegetations  Repeat culture negative- PICC placed in anticipation of dc tomorrow

## 2024-08-08 NOTE — ASSESSMENT & PLAN NOTE
Lab Results   Component Value Date    HGBA1C 8.0 (H) 07/26/2024       Recent Labs     08/07/24  2032 08/08/24  0559 08/08/24  1105 08/08/24  1627   POCGLU 241* 153* 221* 239*         Blood Sugar Average: Last 72 hrs:  (P) 210.1875Home medications include pioglitazone metformin combination.  Hold home medication during hospitalization in light of elevated creatinine.  Monitor blood glucose, insulin per sliding scale [facility]

## 2024-08-08 NOTE — QUICK NOTE
Vascular Surgery  Bedside [facility] Procedure Note      Location of wound: Right groin    Dressings and Foam removed:  2 Black Foam      Dimensions of wound: 7 cm x 3.5 cm x 1 cm    Description of wound: On inspection of the wound today, it appeared healthy and well healing. The wound extends down to subcutaneous fat. Approximately 100% of the wound base is now pink and healthy and there is granulation tissue. The periwound skin remains clean and intact and unremarkable.    VAC dressing application:  The periwound skin was cleaned and dried. 2 black foams were cut to size of the wound and placed into the wound including the bridge. The dressings were then covered with VAC drape. The bridge extended to the patients anterior/lateral thigh. The track pad was then placed over the base of black foam. The VAC was then set to 125 mmHg low Continuous suction. The patient tolerated the procedure well and there were no complications. The patient did not require any excisional debridement during today's dressing change.    VAC settings:  125 mmHg  Continuous    Wound Images:        Jagdish Flynn MD  8/8/2024 11:26 AM

## 2024-08-08 NOTE — CASE MANAGEMENT
Case Management Discharge Planning Note    Patient name Juan Carlos Cleveland Jr.  Location [facility] 524/[facility] 524-01 MRN [id number]  : 1955 Date 2024       Current Admission Date: 2024  Current Admission Diagnosis:Hematoma   Patient Active Problem List    Diagnosis Date Noted Date Diagnosed    Sepsis (HCC) 2024     Fever 2024     Hematoma 2024     Elevated serum creatinine 2024     Type 2 diabetes mellitus (HCC) 2024     Hypertension 2024     Class 1 obesity in adult 2024     Chronic bilateral low back pain without sciatica 2024     Cardiac murmur 2024     Other constipation 10/10/2023     BRBPR (bright red blood per rectum) 10/10/2023     Dysphagia 10/10/2023     Blood in stool 2020     History of DVT (deep vein thrombosis) 2020     Localized swelling, mass and lump, right lower limb 2020     Chest pain 2018       LOS (days): 6  Geometric Mean LOS (GMLOS) (days): 4.4  Days to GMLOS:-1.5     OBJECTIVE:  Risk of Unplanned Readmission Score: 12.66         Current admission status: Inpatient   Preferred Pharmacy:   [id number] - [place], [place] - [address]  [address] [address]  Phone: [phone] Fax: [phone]    Primary Care Provider: Todd Nolasco DO    Primary Insurance: MEDICARE  Secondary Insurance: BLUE CROSS    DISCHARGE DETAILS:    Discharge planning discussed with:: patient and wife        Additional Comments: CM was informed via Multidisciplinary rounds patient is medically ready for DC today.  [facility]  Hospitalist Dr Smith, P5 RN Charu, patient and wife were made aware DC needs to be after 2nd dose of IV ABX on .  KATIE Majano was made aware of DC tomorrow and will schedule the vac change for  for the VNA visit already scheduled.  Yadira at [facility] was made aware of DC planned for  afternoon fgor IV ABX  delivery.  A message was sent to [facility] via [employer] regarding vac change for tomorrow.  CM to be available

## 2024-08-08 NOTE — PROGRESS NOTES
Progress Note - Infectious Disease   Juan Carlos Cleveland Jr. 68 y.o. male MRN: [id number]  Unit/Bed#: [facility] 524-01 Encounter: [id number]      Impression/Plan:    1.  Fever.  Due to #2/3 below.  Fever improved with antibiotics and status post right groin I&D x2.  The patient is hemodynamically stable.  Repeat blood cultures without growth              -Continue IV cefazolin 2 g every 8 hours              -Vascular surgery follow-up ongoing               -Repeat CBC tomorrow to monitor treatment response              -Monitor fever curve     2. MSSA bacteremia.  Both sets admission blood cultures growing MSSA.  Due to infected right groin hematoma.  No intravascular prosthetic devices present. TTE no vegetations (adequate study).  Repeat blood cultures no growth.  PICC placed.              -IV cefazolin 2 g every 8 hours   -Due to vascular involvement of infection, patient will require a 6-week course of IV cefazolin through 9/12/2024   -Weekly CBC with differential and creatinine on IV antibiotics   -Will arrange ID follow-up 2 weeks after discharge   -PICC removal after last dose IV antibiotics     3.  Infected right groin hematoma.  Following cardiac catheterization 7/26 with right femoral artery access.  Lower extremity arterial duplex showed a right groin hematoma, no pseudoaneurysm or AV fistula.  Status post right groin washout with vascular surgery 8/2/2024, tissue culture also growing MSSA.  Status post repeat washout 8/6, there was purulence exuding from the prior I&D site with tracking of the infection around the common femoral artery              -Antibiotics as above              -Vascular surgery follow-up ongoing              -Monitor right groin site     4.  Type 2 diabetes mellitus.  Hemoglobin A1c 8%.  Risk factor for infection.  Glycemic control per primary team.     I have discussed the above management plan to continue IV cefazolin with Dr. Smith.  Discussed with the patient at bedside.  ID will  follow.    Antibiotics:  IV cefazolin  Abx day 8    Subjective:  Status post VAC change today, no ongoing signs of infection of the right groin. No fever, chills, nausea, diarrhea.     Objective:  Vitals:  Temp:  [97.6 °F (36.4 °C)-98.1 °F (36.7 °C)] 98.1 °F (36.7 °C)  HR:  [54-63] 54  Resp:  [16-20] 16  BP: (121-135)/(64-83) 121/69  SpO2:  [94 %-98 %] 97 %  Temp (24hrs), Av.8 °F (36.6 °C), Min:97.6 °F (36.4 °C), Max:98.1 °F (36.7 °C)  Current: Temperature: 98.1 °F (36.7 °C)    Physical Exam:   General Appearance:  Alert, interactive, nontoxic, no acute distress.   Throat: Oropharynx moist without lesions.    Lungs:   Clear to auscultation bilaterally; no wheezes, rhonchi or rales; respirations unlabored   Heart:  RRR; no murmur, rub or gallop   Abdomen:   Soft, non-tender, non-distended, positive bowel sounds.     Extremities: No clubbing, cyanosis or edema.  Right upper extremity PICC   Skin: Right groin wound VAC in place with surrounding erythema       Labs:   All pertinent labs and imaging studies were personally reviewed  Results from last 7 days   Lab Units 24  0709 24  0443 24  0527   WBC Thousand/uL 9.43 12.30* 7.65   HEMOGLOBIN g/dL 10.4* 10.2* 10.5*   PLATELETS Thousands/uL 463* 489* 464*     Results from last 7 days   Lab Units 24  0443 24  0527 24  0544 24  0517 24  0452   SODIUM mmol/L 137 138 137   < > 134*   POTASSIUM mmol/L 4.1 3.7 3.7   < > 3.9   CHLORIDE mmol/L 102 104 102   < > 103   CO2 mmol/L 23 26 24   < > 22   BUN mg/dL 21 18 19   < > 30*   CREATININE mg/dL 0.85 0.72 0.90   < > 1.11   EGFR ml/min/1.73sq m 89 95 87   < > 67   CALCIUM mg/dL 8.4 8.6 8.8   < > 8.6   AST U/L  --   --   --   --  23   ALT U/L  --   --   --   --  22   ALK PHOS U/L  --   --   --   --  100    < > = values in this interval not displayed.                         Micro:  Results from last 7 days   Lab Units 24  1930 24  1916 24  1634   BLOOD CULTURE  No  Growth After 5 Days. No Growth After 5 Days.  --    GRAM STAIN RESULT   --   --  2+ Disintegrating polys*  2+ Gram positive cocci in clusters*       Imaging:  I have personally reviewed pertinent imaging study reports and images in PACS.  Lower extremity arterial duplex: Right groin hematoma, no evidence of pseudoaneurysm or arteriovenous fistula

## 2024-08-08 NOTE — PROGRESS NOTES
[facility]  Progress Note  Name: Juan Carlos Cunningham I  MRN: [id number]  Unit/Bed#: [facility] 524-01 I Date of Admission: 8/2/2024   Date of Service: 8/8/2024 I Hospital Day: 6    Assessment & Plan   Sepsis (HCC)  Assessment & Plan  Patient with PMH of hypertension, diabetes, DVT, NSTEMI  who recently had Cardiac cath (Status postcardiac cath revealing no significant CAD to suggest NSTEMI per cardiology) done on 7/26 (6 days ago) presented with a 5-day history of fever, 1 day history of red, warm, tender lump in groin.           Lab Results   Component Value Date     WBC 13.38 (H) 08/02/2024     HGB 10.7 (L) 08/02/2024     HCT 32.2 (L) 08/02/2024     MCV 92 08/02/2024      08/02/2024      Plan  Monitor vitals  Continue cefepime(2g/50ml dextrose), vancomycin (2000mg)  Blood cultures positive for GPC is clusters, BCID is staph sedrick  Repeat blood cultures post op to confirm resolution of bacteremia  Follow up surgical culture results  Consult infectious disease  Patient antibiotics adjusted from cefepime vancomycin to cefazolin due to MSSA 2 out of 2 and blood cultures, echo ordered-negative for vegetations  Repeat culture negative- PICC placed in anticipation of dc tomorrow    Class 1 obesity in adult  Assessment & Plan  Affects all aspects of his care  Turn q2h  Weight loss counseling when stable as an outpatient    Hypertension  Assessment & Plan  On home medication of lisinopril hydrochlorothiazide.  Blood pressure is well-controlled at this time.  Given elevated creatinine, hold off on lisinopril hydrochlorothiazide.  In the interim, start amlodipine 5 mg daily.  Monitor vitals, adjust dosing as needed.    Type 2 diabetes mellitus (HCC)  Assessment & Plan  Lab Results   Component Value Date    HGBA1C 8.0 (H) 07/26/2024       Recent Labs     08/07/24 2032 08/08/24  0559 08/08/24  1105 08/08/24  1627   POCGLU 241* 153* 221* 239*         Blood Sugar Average: Last 72  hrs:  (P) 210.1875Home medications include pioglitazone metformin combination.  Hold home medication during hospitalization in light of elevated creatinine.  Monitor blood glucose, insulin per sliding scale [facility]      * Hematoma  Assessment & Plan  Patient with a history of hypertension, diabetes, DVT and NSTEMI who recently had cardiac catheterization (7/26) 6 days ago presents with right groin swelling, fevers, decreased appetite and headaches.   Transferred to [facility] for a vascular surgery consultation.  S/p I&D in the OR, repeat planned for 8/7  Follow up cultures  Vancomycin adjusted to cefazolin due to MSSA blood culture positive  Patient pending repeat I/d due to ongoing significant purulence           VTE Pharmacologic Prophylaxis:   Moderate Risk (Score 3-4) - Pharmacological DVT Prophylaxis Ordered: enoxaparin (Lovenox).    Mobility:   Basic Mobility Inpatient Raw Score: 24  JH-HLM Goal: 8: Walk 250 feet or more  JH-HLM Achieved: 7: Walk 25 feet or more  JH-HLM Goal NOT achieved. Continue with multidisciplinary rounding and encourage appropriate mobility to improve upon JH-HLM goals.    Patient Centered Rounds: I performed bedside rounds with nursing staff today.   Discussions with Specialists or Other Care Team Provider: N/A    Education and Discussions with Family / Patient: Updated  (wife) at bedside.    Total Time Spent on Date of Encounter in care of patient: 45 mins. This time was spent on one or more of the following: performing physical exam; counseling and coordination of care; obtaining or reviewing history; documenting in the medical record; reviewing/ordering tests, medications or procedures; communicating with other healthcare professionals and discussing with patient's family/caregivers.    Current Length of Stay: 6 day(s)  Current Patient Status: Inpatient   Certification Statement: The patient will continue to require additional inpatient hospital stay due to R groin  hematoma infection  Discharge Plan: Anticipate discharge tomorrow to home.    Code Status: Level 1 - Full Code    Subjective:   Seen and examined at bedside. Resting comfortably. No new complaints    Objective:     Vitals:   Temp (24hrs), Av.8 °F (36.6 °C), Min:97.6 °F (36.4 °C), Max:98.1 °F (36.7 °C)    Temp:  [97.6 °F (36.4 °C)-98.1 °F (36.7 °C)] 98.1 °F (36.7 °C)  HR:  [54-63] 54  Resp:  [16-20] 16  BP: (121-135)/(64-83) 121/69  SpO2:  [94 %-98 %] 97 %  Body mass index is 34.45 kg/m².     Input and Output Summary (last 24 hours):     Intake/Output Summary (Last 24 hours) at 2024 1818  Last data filed at 2024 1737  Gross per 24 hour   Intake 440 ml   Output 500 ml   Net -60 ml       Physical Exam:   Physical Exam  Vitals and nursing note reviewed.   Constitutional:       General: He is not in acute distress.     Appearance: He is well-developed. He is not toxic-appearing or diaphoretic.   HENT:      Head: Normocephalic and atraumatic.   Eyes:      General: No scleral icterus.     Conjunctiva/sclera: Conjunctivae normal.   Cardiovascular:      Rate and Rhythm: Normal rate and regular rhythm.      Heart sounds: No murmur heard.     No friction rub. No gallop.   Pulmonary:      Effort: Pulmonary effort is normal. No respiratory distress.      Breath sounds: Normal breath sounds. No stridor. No wheezing, rhonchi or rales.   Chest:      Chest wall: No tenderness.   Abdominal:      General: There is no distension.      Palpations: Abdomen is soft. There is no mass.      Tenderness: There is no abdominal tenderness. There is no guarding or rebound.      Hernia: No hernia is present.   Musculoskeletal:         General: No swelling or tenderness.      Cervical back: Neck supple.      Comments: Right groin dressing c/d/i   Skin:     General: Skin is warm and dry.      Capillary Refill: Capillary refill takes less than 2 seconds.   Neurological:      Mental Status: He is alert and oriented to person, place, and  time.   Psychiatric:         Mood and Affect: Mood normal.          Additional Data:     Labs:  Results from last 7 days   Lab Units 08/08/24  0709 08/06/24  0527 08/05/24  0544   WBC Thousand/uL 9.43   < > 8.43   HEMOGLOBIN g/dL 10.4*   < > 11.6*   HEMATOCRIT % 31.3*   < > 35.7*   PLATELETS Thousands/uL 463*   < > 460*   SEGS PCT %  --   --  78*   LYMPHO PCT %  --   --  10*   MONO PCT %  --   --  7   EOS PCT %  --   --  3    < > = values in this interval not displayed.     Results from last 7 days   Lab Units 08/07/24  0443 08/03/24  0517 08/02/24  0452   SODIUM mmol/L 137   < > 134*   POTASSIUM mmol/L 4.1   < > 3.9   CHLORIDE mmol/L 102   < > 103   CO2 mmol/L 23   < > 22   BUN mg/dL 21   < > 30*   CREATININE mg/dL 0.85   < > 1.11   ANION GAP mmol/L 12   < > 9   CALCIUM mg/dL 8.4   < > 8.6   ALBUMIN g/dL  --   --  3.3*   TOTAL BILIRUBIN mg/dL  --   --  0.65   ALK PHOS U/L  --   --  100   ALT U/L  --   --  22   AST U/L  --   --  23   GLUCOSE RANDOM mg/dL 206*   < > 162*    < > = values in this interval not displayed.         Results from last 7 days   Lab Units 08/08/24  1627 08/08/24  1105 08/08/24  0559 08/07/24 2032 08/07/24  1542 08/07/24  1031 08/07/24  0617 08/06/24 2037 08/06/24  1631 08/06/24  1444 08/06/24  1033 08/06/24  0620   POC GLUCOSE mg/dl 239* 221* 153* 241* 204* 247* 214* 245* 203* 163* 184* 173*               Lines/Drains:  Invasive Devices       Peripherally Inserted Central Catheter Line  Duration             PICC Line 08/07/24 Right Basilic 1 day              Peripheral Intravenous Line  Duration             Peripheral IV 08/05/24 Left;Proximal;Ventral (anterior) Forearm 3 days                    Central Line:  Goal for removal: N/A - Discharging with PICC for IV ABX/medications             Imaging: Reviewed radiology reports from this admission including: chest xray    Recent Cultures (last 7 days):   Results from last 7 days   Lab Units 08/02/24  1930 08/02/24  1916 08/02/24  1634   BLOOD  CULTURE  No Growth After 5 Days. No Growth After 5 Days.  --    GRAM STAIN RESULT   --   --  2+ Disintegrating polys*  2+ Gram positive cocci in clusters*       Last 24 Hours Medication List:   Current Facility-Administered Medications   Medication Dose Route Frequency Provider Last Rate    acetaminophen  975 mg Oral Q8H [facility] Iván Bliss MD      amLODIPine  5 mg Oral Daily Iván Bliss MD      aspirin  81 mg Oral Daily Iván Bliss MD      atorvastatin  40 mg Oral Daily With Dinner Iván Bliss MD      cefazolin  2,000 mg Intravenous Q8H Iván Bliss MD 2,000 mg (08/08/24 1337)    enoxaparin  40 mg Subcutaneous Daily Iván Bliss MD      ezetimibe  10 mg Oral Daily Iván Bliss MD      insulin lispro  2-12 Units Subcutaneous 4x Daily (AC & HS) Iván Bliss MD      ondansetron  4 mg Intravenous Q4H PRN Chadd Smith      oxyCODONE  5 mg Oral Q4H PRN Iván Bliss MD      traMADol  50 mg Oral Q6H PRN Iván Bliss MD          Today, Patient Was Seen By: Chadd Smith    **Please Note: This note may have been constructed using a voice recognition system.**

## 2024-08-08 NOTE — DISCHARGE INSTR - AVS FIRST PAGE
DISCHARGE INSTRUCTIONS  NEGATIVE PRESSURE WOUND THERAPY/WOUND VAC    ACTIVITY:   Limit your physical activity with the limb with the Wound VAC therapy.  Walking up steps and normal activities may be resumed as you feel ready.  Avoid strenuous activity such as vigorous exercise.  Avoid heavy lifting (do not lift more than 15 pounds) while you have the Wound VAC in place.  You should not drive a car while you have the Wound VAC in place.  Your provider will instruct you when it is safe to drive.  You may ride in a car.    DIET:  Resume your normal diet.  Good nutrition is important for healing of your incision.  If you are discharged on narcotics for pain control, continue taking your stool softeners until you are having regular bowel movements.    INCISION:  Wound VAC will stay in place 24 hours a day.  A licensed professional (Home Health Care Worker) will come to your home a few times a week to change the bandage and check the machine.  You may need to have the bandage/sponge changed more often if there is a lot of drainage.  You may NOT shower or bathe while wound VAC is in place.  Do NOT remove dressing unless your provider instructs you to.    DO NOT put any powders, creams, ointments, or lotions around the dressing of the Wound VAC.    SWELLING: Most patients have noticeable swelling after leg surgery. This usually improves within a few weeks. If swelling is present, elevate the affected limb whenever possible.     FOLLOW UP APPOINTMENTS:  Making and keeping follow up appointments and ultrasound tests are important to your recovery.  If you have difficulty making it to or keeping your follow up appointments, call the office.    If you have increased pain, fever >101.5, nausea, vomiting, increased drainage, redness, warmth, swelling, change in color of drainage/pus, or a bad smell at your Wound VAC site, new coldness/numbness of your arm or leg, or become dizzy or confused please call us immediately and GO  directly to the ER.    Appt w/ STEPHANE Regan: 8/22/2024 at 3pm, [place] office  *Remember to bring VAC supplies (foam kit, canister, machine, etc.) appointment for wound assessment and dressing change*    PLEASE CALL THE OFFICE IF YOU HAVE ANY QUESTIONS  [phone]  [phone] [address], [place] [address]  [address] [place] [address]  [address] [facility], [address], [place] [address]  [address] [facility], [address]st [address], [place] [address]  [address] [place] [address]  [address] [facility], [place] [address]  [address] [place] [address]  [address] [facility], 1st Floor, [address], [place] [address]  [address] [facility], [place] [address]  [address] [facility], [address], [place] [address]

## 2024-08-08 NOTE — PROGRESS NOTES
Vascular Nurse Navigator Post Op Education    Met with patient at bedside to introduce myself as Vascular Nurse Navigator and explained my role.  Patient is appropriate and accepting to education. Patient was educated with Review of written materials provided, Teachback, Explanation, Demonstration, and Question & Answer on expectations of post op care and recovery on right groin incision and drainage of infected access site hematoma, repeat washout and drainage and wound vac placement. Patient is a former smoker, quit 60 years ago, as such Smoking effects on the lungs, tobacco triggers, and Smoking cessation was reviewed. Education provided to patient on infection prevention, activity limitations, when to call the office, importance of follow up, and incisional care.  Discharge instruction handout provided to patient to review.

## 2024-08-08 NOTE — DISCHARGE INSTR - OTHER ORDERS
SOPHIE ANDERSON VAC:  To be changed by licensed/trained clinician every 2-3 days-- 1st outpatient VAC change due either Fri, 8/9 or Sat 8/10  Foam to wound with black foam bridge (2)  125 mmHg continuous suction  Keep dressing clean, dry, and intact  Showers/no tub baths

## 2024-08-08 NOTE — ASSESSMENT & PLAN NOTE
Patient with a history of hypertension, diabetes, DVT and NSTEMI who recently had cardiac catheterization (7/26) 6 days ago presents with right groin swelling, fevers, decreased appetite and headaches.   Transferred to [facility] for a vascular surgery consultation.  S/p I&D in the OR, repeat planned for 8/7  Follow up cultures  Vancomycin adjusted to cefazolin due to MSSA blood culture positive  Patient pending repeat I/d due to ongoing significant purulence

## 2024-08-09 ENCOUNTER — HOME CARE VISIT (OUTPATIENT)
Dept: HOME HEALTH SERVICES | Facility: HOME HEALTHCARE | Age: 69
End: 2024-08-09
Payer: COMMERCIAL

## 2024-08-09 VITALS
RESPIRATION RATE: 20 BRPM | SYSTOLIC BLOOD PRESSURE: 136 MMHG | HEART RATE: 60 BPM | OXYGEN SATURATION: 97 % | TEMPERATURE: 98.1 F | DIASTOLIC BLOOD PRESSURE: 78 MMHG

## 2024-08-09 VITALS
SYSTOLIC BLOOD PRESSURE: 134 MMHG | HEIGHT: 72 IN | BODY MASS INDEX: 34.4 KG/M2 | TEMPERATURE: 98.1 F | OXYGEN SATURATION: 95 % | RESPIRATION RATE: 18 BRPM | WEIGHT: 254 LBS | DIASTOLIC BLOOD PRESSURE: 65 MMHG | HEART RATE: 67 BPM

## 2024-08-09 LAB
GLUCOSE SERPL-MCNC: 166 MG/DL (ref 65–140)
GLUCOSE SERPL-MCNC: 185 MG/DL (ref 65–140)

## 2024-08-09 PROCEDURE — 99232 SBSQ HOSP IP/OBS MODERATE 35: CPT | Performed by: STUDENT IN AN ORGANIZED HEALTH CARE EDUCATION/TRAINING PROGRAM

## 2024-08-09 PROCEDURE — 10330081 VN NO-PAY CLAIM PROCEDURE

## 2024-08-09 PROCEDURE — G0299 HHS/HOSPICE OF RN EA 15 MIN: HCPCS

## 2024-08-09 PROCEDURE — 82948 REAGENT STRIP/BLOOD GLUCOSE: CPT

## 2024-08-09 PROCEDURE — 99024 POSTOP FOLLOW-UP VISIT: CPT | Performed by: SURGERY

## 2024-08-09 PROCEDURE — 99239 HOSP IP/OBS DSCHRG MGMT >30: CPT | Performed by: STUDENT IN AN ORGANIZED HEALTH CARE EDUCATION/TRAINING PROGRAM

## 2024-08-09 PROCEDURE — 400013 VN SOC

## 2024-08-09 RX ORDER — CEFAZOLIN SODIUM 2 G/50ML
2000 SOLUTION INTRAVENOUS EVERY 8 HOURS
Start: 2024-08-09 | End: 2024-09-12

## 2024-08-09 RX ORDER — ACETAMINOPHEN 325 MG/1
975 TABLET ORAL EVERY 8 HOURS SCHEDULED
Start: 2024-08-09

## 2024-08-09 RX ORDER — OXYCODONE HYDROCHLORIDE 5 MG/1
5 TABLET ORAL EVERY 6 HOURS PRN
Qty: 28 TABLET | Refills: 0 | Status: SHIPPED | OUTPATIENT
Start: 2024-08-09 | End: 2024-08-16

## 2024-08-09 RX ADMIN — AMLODIPINE BESYLATE 5 MG: 5 TABLET ORAL at 07:48

## 2024-08-09 RX ADMIN — ASPIRIN 81 MG: 81 TABLET, COATED ORAL at 07:48

## 2024-08-09 RX ADMIN — CEFAZOLIN SODIUM 2000 MG: 2 SOLUTION INTRAVENOUS at 05:02

## 2024-08-09 RX ADMIN — EZETIMIBE 10 MG: 10 TABLET ORAL at 07:48

## 2024-08-09 RX ADMIN — CEFAZOLIN SODIUM 2000 MG: 2 SOLUTION INTRAVENOUS at 13:11

## 2024-08-09 RX ADMIN — INSULIN LISPRO 2 UNITS: 100 INJECTION, SOLUTION INTRAVENOUS; SUBCUTANEOUS at 11:14

## 2024-08-09 RX ADMIN — ENOXAPARIN SODIUM 40 MG: 40 INJECTION SUBCUTANEOUS at 07:48

## 2024-08-09 RX ADMIN — INSULIN LISPRO 2 UNITS: 100 INJECTION, SOLUTION INTRAVENOUS; SUBCUTANEOUS at 07:43

## 2024-08-09 NOTE — PLAN OF CARE
Problem: INFECTION - ADULT  Goal: Absence or prevention of progression during hospitalization  Description: INTERVENTIONS:  - Assess and monitor for signs and symptoms of infection  - Monitor lab/diagnostic results  - Monitor all insertion sites, i.e. indwelling lines, tubes, and drains  - Monitor endotracheal if appropriate and nasal secretions for changes in amount and color  - Cost appropriate cooling/warming therapies per order  - Administer medications as ordered  - Instruct and encourage patient and family to use good hand hygiene technique  - Identify and instruct in appropriate isolation precautions for identified infection/condition  Outcome: Progressing

## 2024-08-09 NOTE — ASSESSMENT & PLAN NOTE
Lab Results   Component Value Date    HGBA1C 8.0 (H) 07/26/2024       Recent Labs     08/08/24  1627 08/08/24  2104 08/09/24  0649 08/09/24  1034   POCGLU 239* 201* 166* 185*         Blood Sugar Average: Last 72 hrs:  (P) 202.6Home medications include pioglitazone metformin combination.  Hold home medication during hospitalization in light of elevated creatinine.  Monitor blood glucose, insulin per sliding scale ACH

## 2024-08-09 NOTE — PROGRESS NOTES
[facility]  Progress Note  Name: Juan Carlos Cunningham I  MRN: [id number]  Unit/Bed#: [facility] 524-01 I Date of Admission: 8/2/2024   Date of Service: 8/9/2024 I Hospital Day: 7    Assessment & Plan   Sepsis (HCC)  Assessment & Plan  Patient with PMH of hypertension, diabetes, DVT, NSTEMI  who recently had Cardiac cath (Status postcardiac cath revealing no significant CAD to suggest NSTEMI per cardiology) done on 7/26 (6 days ago) presented with a 5-day history of fever, 1 day history of red, warm, tender lump in groin.           Lab Results   Component Value Date     WBC 13.38 (H) 08/02/2024     HGB 10.7 (L) 08/02/2024     HCT 32.2 (L) 08/02/2024     MCV 92 08/02/2024      08/02/2024      Plan  Monitor vitals  Continue cefepime(2g/50ml dextrose), vancomycin (2000mg)  Blood cultures positive for GPC is clusters, BCID is staph sedrick  Repeat blood cultures post op to confirm resolution of bacteremia  Follow up surgical culture results  Consult infectious disease  Patient antibiotics adjusted from cefepime vancomycin to cefazolin due to MSSA 2 out of 2 and blood cultures, echo ordered-negative for vegetations  Repeat culture negative- PICC placed in anticipation of dc tomorrow    Class 1 obesity in adult  Assessment & Plan  Affects all aspects of his care  Turn q2h  Weight loss counseling when stable as an outpatient    Hypertension  Assessment & Plan  On home medication of lisinopril hydrochlorothiazide.  Blood pressure is well-controlled at this time.  Given elevated creatinine, hold off on lisinopril hydrochlorothiazide.  In the interim, start amlodipine 5 mg daily.  Monitor vitals, adjust dosing as needed.    Type 2 diabetes mellitus (HCC)  Assessment & Plan  Lab Results   Component Value Date    HGBA1C 8.0 (H) 07/26/2024       Recent Labs     08/08/24  1627 08/08/24  2104 08/09/24  0649 08/09/24  1034   POCGLU 239* 201* 166* 185*         Blood Sugar Average: Last 72  hrs:  (P) 202.6Home medications include pioglitazone metformin combination.  Hold home medication during hospitalization in light of elevated creatinine.  Monitor blood glucose, insulin per sliding scale ACH      * Hematoma  Assessment & Plan  Patient with a history of hypertension, diabetes, DVT and NSTEMI who recently had cardiac catheterization (7/26) 6 days ago presents with right groin swelling, fevers, decreased appetite and headaches.   Transferred to [facility] for a vascular surgery consultation.  S/p I&D in the OR, repeat planned for 8/7  Follow up cultures  Vancomycin adjusted to cefazolin due to MSSA blood culture positive  Patient pending repeat I/d due to ongoing significant purulence         Medical Problems       Resolved Problems  Date Reviewed: 8/9/2024   None       Discharging Physician / Practitioner: Chadd Smith  PCP: Todd Nolasco DO  Admission Date:   Admission Orders (From admission, onward)       Ordered        08/02/24 1624  INPATIENT ADMISSION  Once                          Discharge Date: 08/09/24    Consultations During Hospital Stay:  ***    Procedures Performed:   ***    Significant Findings / Test Results:   ***    Incidental Findings:   ***   {SLIM Discharge Incidential Findings:99408}    Test Results Pending at Discharge (will require follow up):   ***     Outpatient Tests Requested:  ***    Complications:  ***    Reason for Admission: ***    Hospital Course:   Juan Carlos Cleveland Jr. is a 68 y.o. male patient who originally presented to the hospital on 8/2/2024 due to ***    {Hospital Course:15803}    {Complete this smartphrase if the patient is an inpatient and being discharged earlier than 2 midnights. If this does not apply, please delete this line:92055}    Please see above list of diagnoses and related plan for additional information.     Condition at Discharge: {Condition:13333}    Discharge Day Visit / Exam:   Subjective:  ***  Vitals: Blood Pressure: 134/65 (08/09/24  0740)  Pulse: 67 (08/09/24 0740)  Temperature: 98.1 °F (36.7 °C) (08/09/24 0740)  Temp Source: Oral (08/09/24 0740)  Respirations: 18 (08/09/24 0740)  Height: 6' (182.9 cm) (08/04/24 0844)  Weight - Scale: 115 kg (254 lb) (08/04/24 0844)  SpO2: 95 % (08/09/24 0740)  Exam:   Physical Exam ***    Discussion with Family: {Family Communication:41387}    Discharge instructions/Information to patient and family:   See after visit summary for information provided to patient and family.      Provisions for Follow-Up Care:  See after visit summary for information related to follow-up care and any pertinent home health orders.      Mobility at time of Discharge:   Basic Mobility Inpatient Raw Score: 24  JH-HLM Goal: 8: Walk 250 feet or more  JH-HLM Achieved: 6: Walk 10 steps or more  {Mobility:19261}     Disposition:   {Disposition on Discharge:07619}    Planned Readmission: ***     Discharge Statement:  I spent *** minutes discharging the patient. This time was spent on the day of discharge. I had direct contact with the patient on the day of discharge. Greater than 50% of the total time was spent examining patient, answering all patient questions, arranging and discussing plan of care with patient as well as directly providing post-discharge instructions.  Additional time then spent on discharge activities.    Discharge Medications:  See after visit summary for reconciled discharge medications provided to patient and/or family.      **Please Note: This note may have been constructed using a voice recognition system**   General: Skin is warm and dry.      Capillary Refill: Capillary refill takes less than 2 seconds.   Neurological:      Mental Status: He is alert and oriented to person, place, and time.   Psychiatric:         Mood and Affect: Mood normal.          Discussion with Family: Updated  (wife) at bedside.    Discharge instructions/Information to patient and family:   See after visit summary for information provided to patient and family.      Provisions for Follow-Up Care:  See after visit summary for information related to follow-up care and any pertinent home health orders.      Mobility at time of Discharge:   Basic Mobility Inpatient Raw Score: 24  JH-HLM Goal: 8: Walk 250 feet or more  JH-HLM Achieved: 6: Walk 10 steps or more  HLM Goal NOT achieved. Continue to encourage mobility in post discharge setting.     Disposition:   Home    Planned Readmission: N/A     Discharge Statement:  I spent 45 minutes discharging the patient. This time was spent on the day of discharge. I had direct contact with the patient on the day of discharge. Greater than 50% of the total time was spent examining patient, answering all patient questions, arranging and discussing plan of care with patient as well as directly providing post-discharge instructions.  Additional time then spent on discharge activities.    Discharge Medications:  See after visit summary for reconciled discharge medications provided to patient and/or family.      **Please Note: This note may have been constructed using a voice recognition system**

## 2024-08-09 NOTE — PROGRESS NOTES
Progress Note - Infectious Disease   Juan Carlos Cleveland Jr. 68 y.o. male MRN: [id number]  Unit/Bed#: [facility] 524-01 Encounter: [id number]      Impression/Plan:    1.  Fever.  Due to #2/3 below.  Fever improved with antibiotics and status post right groin I&D x2.  The patient is hemodynamically stable.  Repeat blood cultures without growth              -Continue IV cefazolin 2 g every 8 hours              -Vascular surgery follow-up ongoing               -Repeat CBC tomorrow to monitor treatment response              -Monitor fever curve     2. MSSA bacteremia.  Both sets admission blood cultures growing MSSA.  Due to infected right groin hematoma.  No intravascular prosthetic devices present. TTE no vegetations (adequate study).  Repeat blood cultures no growth.  PICC placed.              -IV cefazolin 2 g every 8 hours   -Due to vascular involvement of infection, patient will require a 6-week course of IV cefazolin through 9/12/2024   -Weekly CBC with differential and creatinine on IV antibiotics   -ID follow up 8/22/24   -PICC removal after last dose IV antibiotics     3.  Infected right groin hematoma.  Following cardiac catheterization 7/26 with right femoral artery access.  Lower extremity arterial duplex showed a right groin hematoma, no pseudoaneurysm or AV fistula.  Status post right groin washout with vascular surgery 8/2/2024, tissue culture also growing MSSA.  Status post repeat washout 8/6, there was purulence exuding from the prior I&D site with tracking of the infection around the common femoral artery              -Antibiotics as above              -Vascular surgery follow-up ongoing              -Monitor right groin site     4.  Type 2 diabetes mellitus.  Hemoglobin A1c 8%.  Risk factor for infection.  Glycemic control per primary team.     I have discussed the above management plan to continue IV cefazolin with Dr. Smith.  Discussed with the patient at bedside.  Okay for discharge from ID  perspective.    Antibiotics:  IV cefazolin  Abx day 9    Subjective:  The patient is doing well.  No significant pain in the groin.  No fever or chills.  He is tolerating IV antibiotics without nausea, diarrhea, vomiting.    Objective:  Vitals:  Temp:  [97.7 °F (36.5 °C)-98.1 °F (36.7 °C)] 98.1 °F (36.7 °C)  HR:  [54-67] 67  Resp:  [16-18] 18  BP: (121-134)/(65-77) 134/65  SpO2:  [95 %-98 %] 95 %  Temp (24hrs), Av °F (36.7 °C), Min:97.7 °F (36.5 °C), Max:98.1 °F (36.7 °C)  Current: Temperature: 98.1 °F (36.7 °C)    Physical Exam:   General Appearance:  Alert, interactive, nontoxic, no acute distress.   Throat: Oropharynx moist without lesions.    Lungs:   Clear to auscultation bilaterally; no wheezes, rhonchi or rales; respirations unlabored   Heart:  RRR; no murmur, rub or gallop   Abdomen:   Soft, non-tender, non-distended, positive bowel sounds.     Extremities: No clubbing, cyanosis or edema.  Right upper extremity PICC   Skin: Right groin wound VAC in place       Labs:   All pertinent labs and imaging studies were personally reviewed  Results from last 7 days   Lab Units 24  0709 24  0443 08/06/24  0527   WBC Thousand/uL 9.43 12.30* 7.65   HEMOGLOBIN g/dL 10.4* 10.2* 10.5*   PLATELETS Thousands/uL 463* 489* 464*     Results from last 7 days   Lab Units 24  0443 24  0527 24  0544   SODIUM mmol/L 137 138 137   POTASSIUM mmol/L 4.1 3.7 3.7   CHLORIDE mmol/L 102 104 102   CO2 mmol/L 23 26 24   BUN mg/dL 21 18 19   CREATININE mg/dL 0.85 0.72 0.90   EGFR ml/min/1.73sq m 89 95 87   CALCIUM mg/dL 8.4 8.6 8.8                         Micro:  Results from last 7 days   Lab Units 24  19324  1916 24  1634   BLOOD CULTURE  No Growth After 5 Days. No Growth After 5 Days.  --    GRAM STAIN RESULT   --   --  2+ Disintegrating polys*  2+ Gram positive cocci in clusters*       Imaging:  I have personally reviewed pertinent imaging study reports and images in PACS.  Lower  extremity arterial duplex: Right groin hematoma, no evidence of pseudoaneurysm or arteriovenous fistula

## 2024-08-09 NOTE — CASE MANAGEMENT
DCS [facility]. Did not state reason for call. Provided call back P# [phone].     Call made [facility], left callback P# on line d/t high call volume and long hold time.

## 2024-08-09 NOTE — ASSESSMENT & PLAN NOTE
Patient with a history of hypertension, diabetes, DVT and NSTEMI who recently had cardiac catheterization (7/26) 6 days ago presents with right groin swelling, fevers, decreased appetite and headaches.   Transferred to [facility] for a vascular surgery consultation.  S/p I&D in the OR, repeat planned for 8/7  Follow up cultures  Vancomycin adjusted to cefazolin due to MSSA blood culture positive  Patient pending repeat I/d due to ongoing significant purulence

## 2024-08-09 NOTE — PROGRESS NOTES
Progress Note - Vascular Surgery  Juan Carlos Cleveland Jr. 68 y.o. male MRN: [id number]  Unit/Bed#: [facility] 524-01 Encounter: [id number]    Assessment:  68 y.o. male male w/ hx of HTN, DM, DVT, CAD s/p cardiac cath via R-fem access c/b right groin hematoma now presenting w/ bacteremia and right groin hematoma infection.       7/26- S/p cardiac cathetertization with femoral access      8/2 - s/p right groin incision, drainage, and exploration      8/6-  s/p R groin exploration, arteriotomy w/ primary repair, washout & VAC placement    Wound VAC was changed yesterday, with no concerns, ongoing functioning.  Patient remains on IV antibiotics for bacteremia with Staph aureus positive blood cultures on 8/1, and Staph aureus positive wound cultures on 8/2.    Plan:  - Diet [place]/CHO Controlled; Consistent Carbohydrate Diet Level 3 (6 carb servings/90 grams CHO/meal)  - Pain and Nausea control PRN  - Incentive spirometry  - OOB, ambulate  -Patient is medically cleared for discharge  - If patient still present, will proceed with wound VAC change 8/10  - Antibiotics until 9/12 per ID  - Continue ASA/statin  - No further plans for vascular surgery intervention as long as patient continues to improve  - Remainder of care per primary team  - Please message the Vascular surgery resident role with any concerns    Subjective/Objective     Subjective: No complaints this morning.  Right groin pain slightly improved.      Objective:   Vitals: Blood pressure 132/77, pulse 59, temperature 97.7 °F (36.5 °C), temperature source Oral, resp. rate 16, height 6' (1.829 m), weight 115 kg (254 lb), SpO2 96%.,Body mass index is 34.45 kg/m².    I/O         08/07 0701  08/08 0700 08/08 0701  08/09 0700 08/09 0701  08/10 0700    P.O. 300 180     I.V. (mL/kg)       IV Piggyback 80 50     Total Intake(mL/kg) 380 (3.3) 230 (2)     Urine (mL/kg/hr) 500 (0.2)      Drains 0 0     Total Output 500 0     Net -120 +230            Unmeasured Urine Occurrence  1 x   "           Physical Exam:  Gen: NAD, Aox3, Comfortable in bed  Chest: Normal work of breathing, no respiratory distress  Abd: Soft, ND, NT.  Right groin with mild tenderness, decreased erythema.  Wound VAC in place  Ext: RLE nontender  Pulses: Bilateral palpable DP and PT  Skin: Warm, Dry, Intact      Lab, Imaging and other studies: I have personally reviewed pertinent reports.  , CBC with diff: No results found for: \"WBC\", \"HGB\", \"HCT\", \"MCV\", \"PLT\", \"ADJUSTEDWBC\", \"RBC\", \"MCH\", \"MCHC\", \"RDW\", \"MPV\", \"NRBC\", BMP/CMP: No results found for: \"SODIUM\", \"K\", \"CL\", \"CO2\", \"ANIONGAP\", \"BUN\", \"CREATININE\", \"GLUCOSE\", \"CALCIUM\", \"AST\", \"ALT\", \"ALKPHOS\", \"PROT\", \"BILITOT\", \"EGFR\"  VTE Pharmacologic Prophylaxis: Enoxaparin (Lovenox)  VTE Mechanical Prophylaxis: sequential compression device        Dina Izquierdo MD  8/9/2024  7:20 AM      "

## 2024-08-10 ENCOUNTER — HOME CARE VISIT (OUTPATIENT)
Dept: HOME HEALTH SERVICES | Facility: HOME HEALTHCARE | Age: 69
End: 2024-08-10
Payer: COMMERCIAL

## 2024-08-10 PROCEDURE — G0299 HHS/HOSPICE OF RN EA 15 MIN: HCPCS

## 2024-08-11 ENCOUNTER — HOME CARE VISIT (OUTPATIENT)
Dept: HOME HEALTH SERVICES | Facility: HOME HEALTHCARE | Age: 69
End: 2024-08-11
Payer: COMMERCIAL

## 2024-08-11 VITALS
OXYGEN SATURATION: 98 % | HEART RATE: 78 BPM | TEMPERATURE: 97.8 F | RESPIRATION RATE: 18 BRPM | SYSTOLIC BLOOD PRESSURE: 128 MMHG | DIASTOLIC BLOOD PRESSURE: 78 MMHG

## 2024-08-11 NOTE — CASE COMMUNICATION
1620...Patient called into office regarding visit time for tomorrow, Monday 8/12/24. Attempted to place TC back to patient regarding. No answer.   1930...Patient called back to SN. Notified patient he is on schedule for Monday but assigned SN will need to notify him of planned visit time. Patient verbalized understanding.

## 2024-08-12 ENCOUNTER — RA CDI HCC (OUTPATIENT)
Dept: OTHER | Facility: HOSPITAL | Age: 69
End: 2024-08-12

## 2024-08-12 ENCOUNTER — LAB REQUISITION (OUTPATIENT)
Dept: LAB | Facility: HOSPITAL | Age: 69
End: 2024-08-12
Payer: COMMERCIAL

## 2024-08-12 ENCOUNTER — HOME CARE VISIT (OUTPATIENT)
Dept: HOME HEALTH SERVICES | Facility: HOME HEALTHCARE | Age: 69
End: 2024-08-12
Payer: COMMERCIAL

## 2024-08-12 ENCOUNTER — TELEPHONE (OUTPATIENT)
Dept: VASCULAR SURGERY | Facility: CLINIC | Age: 69
End: 2024-08-12

## 2024-08-12 VITALS
OXYGEN SATURATION: 99 % | HEART RATE: 68 BPM | SYSTOLIC BLOOD PRESSURE: 150 MMHG | RESPIRATION RATE: 20 BRPM | DIASTOLIC BLOOD PRESSURE: 72 MMHG | TEMPERATURE: 97.5 F

## 2024-08-12 DIAGNOSIS — T14.8XXA INFECTED HEMATOMA: ICD-10-CM

## 2024-08-12 DIAGNOSIS — L08.9 INFECTED HEMATOMA: ICD-10-CM

## 2024-08-12 DIAGNOSIS — A41.9 SEPSIS, UNSPECIFIED ORGANISM (HCC): ICD-10-CM

## 2024-08-12 DIAGNOSIS — R78.81 BACTEREMIA: Primary | ICD-10-CM

## 2024-08-12 LAB
BASOPHILS # BLD AUTO: 0.03 THOUSANDS/ÂΜL (ref 0–0.1)
BASOPHILS NFR BLD AUTO: 0 % (ref 0–1)
CREAT SERPL-MCNC: 0.76 MG/DL (ref 0.6–1.3)
EOSINOPHIL # BLD AUTO: 0.09 THOUSAND/ÂΜL (ref 0–0.61)
EOSINOPHIL NFR BLD AUTO: 1 % (ref 0–6)
ERYTHROCYTE [DISTWIDTH] IN BLOOD BY AUTOMATED COUNT: 14.6 % (ref 11.6–15.1)
GFR SERPL CREATININE-BSD FRML MDRD: 93 ML/MIN/1.73SQ M
HCT VFR BLD AUTO: 34.2 % (ref 36.5–49.3)
HGB BLD-MCNC: 10.9 G/DL (ref 12–17)
IMM GRANULOCYTES # BLD AUTO: 0.03 THOUSAND/UL (ref 0–0.2)
IMM GRANULOCYTES NFR BLD AUTO: 0 % (ref 0–2)
LYMPHOCYTES # BLD AUTO: 1.06 THOUSANDS/ÂΜL (ref 0.6–4.47)
LYMPHOCYTES NFR BLD AUTO: 14 % (ref 14–44)
MCH RBC QN AUTO: 29.8 PG (ref 26.8–34.3)
MCHC RBC AUTO-ENTMCNC: 31.9 G/DL (ref 31.4–37.4)
MCV RBC AUTO: 93 FL (ref 82–98)
MONOCYTES # BLD AUTO: 0.46 THOUSAND/ÂΜL (ref 0.17–1.22)
MONOCYTES NFR BLD AUTO: 6 % (ref 4–12)
NEUTROPHILS # BLD AUTO: 5.73 THOUSANDS/ÂΜL (ref 1.85–7.62)
NEUTS SEG NFR BLD AUTO: 79 % (ref 43–75)
NRBC BLD AUTO-RTO: 0 /100 WBCS
PLATELET # BLD AUTO: 419 THOUSANDS/UL (ref 149–390)
PMV BLD AUTO: 9.8 FL (ref 8.9–12.7)
RBC # BLD AUTO: 3.66 MILLION/UL (ref 3.88–5.62)
WBC # BLD AUTO: 7.4 THOUSAND/UL (ref 4.31–10.16)

## 2024-08-12 PROCEDURE — 85025 COMPLETE CBC W/AUTO DIFF WBC: CPT | Performed by: STUDENT IN AN ORGANIZED HEALTH CARE EDUCATION/TRAINING PROGRAM

## 2024-08-12 PROCEDURE — G0299 HHS/HOSPICE OF RN EA 15 MIN: HCPCS

## 2024-08-12 PROCEDURE — 82565 ASSAY OF CREATININE: CPT | Performed by: STUDENT IN AN ORGANIZED HEALTH CARE EDUCATION/TRAINING PROGRAM

## 2024-08-12 NOTE — TELEPHONE ENCOUNTER
Vascular Nurse Navigator Post Op Call    Procedure: Right Groin washout     Date of Procedure: 8/2/24    Surgeon:   * Jenifer Zee MD - Primary     * Jagdish Flynn MD - Assisting     * Iván Bliss MD - Assisting    Procedure: Right groin exploration, washout, debridement of infected subcutaneous tissue and artery, arterial exploration with primary closure, deep tissue closure.   VAC placement - 2 black sponges, 1 in wound and 1 bridge    Date of Procedure: 8/6/24    Surgeon:    * Jasmin Denny MD - Primary     * Dina Izquierdo MD - Assisting     * Iván Bliss MD - Fellow    Discharge Date: 8/9/24    Discharge Disposition: Home with [facility]    Leg Weakness?: No    Leg Swelling?: No    Leg Numbness?: No    Chest Pain?: No    Shortness of Breath?: No    Orthopnea?: No    Anticoagulation pt was discharged on post op?: Aspirin    Statin pt was discharged on post op?:  Rosuvastatin (Crestor)    Bleeding?: No    Uncontrolled Pain?: No    Incision Concerns?: No    Fever or Chills?: No      Reviewed discharge instructions and incision care with patient.      NEXT OFFICE VISIT SCHEDULED:  8/22/24 at 3 pm with STEPHANE Regan at [facility] Confirmed?: Yes      Any further questions/concerns?  Patient stated that he is doing good since discharge.  He stated the nurse from [facility] was out on Saturday to change his wound vac and will be out again today.  He denies any issues with wound vac.  All questions answered.  No concerns expressed at this time.

## 2024-08-12 NOTE — PROGRESS NOTES
OPAT NOTE    Supervising/Discharge physician: Jayden    Diagnosis: Infected Rt Groin Hematoma, Bacteremia    Drug: Cefazolin    Dose/Route/Frequency: 2g IV q8    Labs/Frequency: CBCD Cre weekly    End Date: 9/12    Infusion/VNA contact: Oliver/FRANCESCA    Next appointment: 8/22        MA assigned:  Lelia

## 2024-08-13 ENCOUNTER — TELEPHONE (OUTPATIENT)
Dept: INFECTIOUS DISEASES | Facility: CLINIC | Age: 69
End: 2024-08-13

## 2024-08-13 NOTE — TELEPHONE ENCOUNTER
Contacted patient to discuss how he is doing on antibiotics. Patient confirms understanding of medication administration, weekly labs, PICC care. He confirms follow up next week with Dr. Carpenter. He does not have any questions/concerns at this time.  He thanks me for my call.

## 2024-08-13 NOTE — DISCHARGE SUMMARY
[facility]  Discharge- Juan Carlos Cleveland Jr. 1955, 68 y.o. male MRN: [id number]  [facility] Encounter: [id number]  Primary Care Provider: Todd Nolasco DO   Date and time admitted to hospital: 8/2/2024  3:43 PM  Assessment & Plan  Sepsis (HCC)  Assessment & Plan  Patient with PMH of hypertension, diabetes, DVT, NSTEMI  who recently had Cardiac cath (Status postcardiac cath revealing no significant CAD to suggest NSTEMI per cardiology) done on 7/26 (6 days ago) presented with a 5-day history of fever, 1 day history of red, warm, tender lump in groin.               Lab Results   Component Value Date     WBC 13.38 (H) 08/02/2024     HGB 10.7 (L) 08/02/2024     HCT 32.2 (L) 08/02/2024     MCV 92 08/02/2024      08/02/2024      Plan  Monitor vitals  Continue cefepime(2g/50ml dextrose), vancomycin (2000mg)  Blood cultures positive for GPC is clusters, BCID is staph sedrick  Repeat blood cultures post op to confirm resolution of bacteremia  Follow up surgical culture results  Consult infectious disease  Patient antibiotics adjusted from cefepime vancomycin to cefazolin due to MSSA 2 out of 2 and blood cultures, echo ordered-negative for vegetations  Repeat culture negative- PICC placed in anticipation of dc tomorrow     Class 1 obesity in adult  Assessment & Plan  Affects all aspects of his care  Turn q2h  Weight loss counseling when stable as an outpatient     Hypertension  Assessment & Plan  On home medication of lisinopril hydrochlorothiazide.  Blood pressure is well-controlled at this time.  Given elevated creatinine, hold off on lisinopril hydrochlorothiazide.  In the interim, start amlodipine 5 mg daily.  Monitor vitals, adjust dosing as needed.     Type 2 diabetes mellitus (HCC)  Assessment & Plan        Lab Results   Component Value Date     HGBA1C 8.0 (H) 07/26/2024                Recent Labs     08/08/24  1627 08/08/24  2104 08/09/24  0649  08/09/24  1034   POCGLU 239* 201* 166* 185*            Blood Sugar Average: Last 72 hrs:  (P) 202.6Home medications include pioglitazone metformin combination.  Hold home medication during hospitalization in light of elevated creatinine.  Monitor blood glucose, insulin per sliding scale [facility]        * Hematoma  Assessment & Plan  Patient with a history of hypertension, diabetes, DVT and NSTEMI who recently had cardiac catheterization (7/26) 6 days ago presents with right groin swelling, fevers, decreased appetite and headaches.   Transferred to [facility] for a vascular surgery consultation.  S/p I&D in the OR, repeat planned for 8/7  Follow up cultures  Vancomycin adjusted to cefazolin due to MSSA blood culture positive  Patient pending repeat I/d due to ongoing significant purulence              Medical Problems         Resolved Problems  Date Reviewed: 8/9/2024   None         Discharging Physician / Practitioner: Chadd Smith  PCP: Todd Nolasco DO  Admission Date:   Admission Orders (From admission, onward)          Ordered         08/02/24 1624   INPATIENT ADMISSION  Once                               Discharge Date: 08/09/24     Consultations During Hospital Stay:  Vascular Surgery  Infectious Disease  Cardiology     Procedures Performed:   Right groin exploration, washout, debridement of infected subcutaneous tissue and artery, arterial exploration, primary closure, deep tissue closure, VAC placement 8/6     Significant Findings / Test Results:   Blood culture- Staph aureus     Incidental Findings:   N/A      Test Results Pending at Discharge (will require follow up):   None     Outpatient Tests Requested:  None     Complications:  None     Reason for Admission: Sepsis     Hospital Course:   Juan Carlos Cleveland Jr. is a 68 y.o. male patient who originally presented to the hospital on 8/2/2024 due to sepsis, right groin swelling, and pain.  He had a recent cath with right inguinal access site and returned  with concerns for infection in that area.  Blood cultures grew Staph aureus.  Vascular, infectious disease, cardiology were consulted.  Patient was taken for washout with wound VAC placement which she tolerated well.  He had PICC line placed for chronic antibiotics with cefazolin.  Final ID of staph was MSSA.  He had an uneventful postoperative course, having his wound VAC changed during hospitalization.  Ultimately he was discharged in good health with plans for long-term antibiotic therapy.     Please see above list of diagnoses and related plan for additional information.      Condition at Discharge: good     Discharge Day Visit / Exam:   Subjective:  No new concerns or complaints  Vitals: Blood Pressure: 134/65 (08/09/24 0740)  Pulse: 67 (08/09/24 0740)  Temperature: 98.1 °F (36.7 °C) (08/09/24 0740)  Temp Source: Oral (08/09/24 0740)  Respirations: 18 (08/09/24 0740)  Height: 6' (182.9 cm) (08/04/24 0844)  Weight - Scale: 115 kg (254 lb) (08/04/24 0844)  SpO2: 95 % (08/09/24 0740)  Exam:   Physical Exam  Vitals and nursing note reviewed.   Constitutional:       General: He is not in acute distress.     Appearance: He is well-developed. He is not toxic-appearing or diaphoretic.   HENT:      Head: Normocephalic and atraumatic.   Eyes:      General: No scleral icterus.     Conjunctiva/sclera: Conjunctivae normal.   Cardiovascular:      Rate and Rhythm: Normal rate and regular rhythm.      Heart sounds: No murmur heard.     No friction rub. No gallop.   Pulmonary:      Effort: Pulmonary effort is normal. No respiratory distress.      Breath sounds: Normal breath sounds. No stridor. No wheezing, rhonchi or rales.   Chest:      Chest wall: No tenderness.   Abdominal:      General: There is no distension.      Palpations: Abdomen is soft. There is no mass.      Tenderness: There is no abdominal tenderness. There is no guarding or rebound.      Hernia: No hernia is present.   Musculoskeletal:         General: No  swelling or tenderness.      Cervical back: Neck supple.      Comments: Right groin dressing c/d/i   Skin:     General: Skin is warm and dry.      Capillary Refill: Capillary refill takes less than 2 seconds.   Neurological:      Mental Status: He is alert and oriented to person, place, and time.   Psychiatric:         Mood and Affect: Mood normal.            Discussion with Family: Updated  (wife) at bedside.     Discharge instructions/Information to patient and family:   See after visit summary for information provided to patient and family.       Provisions for Follow-Up Care:  See after visit summary for information related to follow-up care and any pertinent home health orders.       Mobility at time of Discharge:   Basic Mobility Inpatient Raw Score: 24  JH-HLM Goal: 8: Walk 250 feet or more  JH-HLM Achieved: 6: Walk 10 steps or more  HLM Goal NOT achieved. Continue to encourage mobility in post discharge setting.     Disposition:   Home     Planned Readmission: N/A     Discharge Statement:  I spent 45 minutes discharging the patient. This time was spent on the day of discharge. I had direct contact with the patient on the day of discharge. Greater than 50% of the total time was spent examining patient, answering all patient questions, arranging and discussing plan of care with patient as well as directly providing post-discharge instructions.  Additional time then spent on discharge activities.     Discharge Medications:  See after visit summary for reconciled discharge medications provided to patient and/or family.       **Please Note: This note may have been constructed using a voice recognition system**

## 2024-08-14 ENCOUNTER — HOME CARE VISIT (OUTPATIENT)
Dept: HOME HEALTH SERVICES | Facility: HOME HEALTHCARE | Age: 69
End: 2024-08-14
Payer: COMMERCIAL

## 2024-08-14 VITALS
RESPIRATION RATE: 16 BRPM | SYSTOLIC BLOOD PRESSURE: 100 MMHG | DIASTOLIC BLOOD PRESSURE: 58 MMHG | TEMPERATURE: 97.1 F | OXYGEN SATURATION: 99 % | HEART RATE: 58 BPM

## 2024-08-14 PROCEDURE — G0299 HHS/HOSPICE OF RN EA 15 MIN: HCPCS

## 2024-08-16 ENCOUNTER — HOME CARE VISIT (OUTPATIENT)
Dept: HOME HEALTH SERVICES | Facility: HOME HEALTHCARE | Age: 69
End: 2024-08-16
Payer: COMMERCIAL

## 2024-08-16 VITALS
RESPIRATION RATE: 18 BRPM | SYSTOLIC BLOOD PRESSURE: 222 MMHG | TEMPERATURE: 98.6 F | DIASTOLIC BLOOD PRESSURE: 76 MMHG | HEART RATE: 68 BPM | OXYGEN SATURATION: 98 %

## 2024-08-16 PROCEDURE — G0300 HHS/HOSPICE OF LPN EA 15 MIN: HCPCS

## 2024-08-19 ENCOUNTER — APPOINTMENT (OUTPATIENT)
Dept: LAB | Facility: HOSPITAL | Age: 69
End: 2024-08-19
Attending: STUDENT IN AN ORGANIZED HEALTH CARE EDUCATION/TRAINING PROGRAM
Payer: COMMERCIAL

## 2024-08-19 ENCOUNTER — HOME CARE VISIT (OUTPATIENT)
Dept: HOME HEALTH SERVICES | Facility: HOME HEALTHCARE | Age: 69
End: 2024-08-19
Payer: COMMERCIAL

## 2024-08-19 VITALS
SYSTOLIC BLOOD PRESSURE: 116 MMHG | OXYGEN SATURATION: 99 % | RESPIRATION RATE: 16 BRPM | DIASTOLIC BLOOD PRESSURE: 62 MMHG | TEMPERATURE: 97.4 F | HEART RATE: 63 BPM

## 2024-08-19 DIAGNOSIS — L08.9 INFECTED HEMATOMA: ICD-10-CM

## 2024-08-19 DIAGNOSIS — T14.8XXA INFECTED HEMATOMA: ICD-10-CM

## 2024-08-19 DIAGNOSIS — R78.81 BACTEREMIA: ICD-10-CM

## 2024-08-19 LAB
BASOPHILS # BLD AUTO: 0.03 THOUSANDS/ÂΜL (ref 0–0.1)
BASOPHILS NFR BLD AUTO: 1 % (ref 0–1)
CREAT SERPL-MCNC: 0.72 MG/DL (ref 0.6–1.3)
EOSINOPHIL # BLD AUTO: 0.07 THOUSAND/ÂΜL (ref 0–0.61)
EOSINOPHIL NFR BLD AUTO: 1 % (ref 0–6)
ERYTHROCYTE [DISTWIDTH] IN BLOOD BY AUTOMATED COUNT: 14.6 % (ref 11.6–15.1)
GFR SERPL CREATININE-BSD FRML MDRD: 95 ML/MIN/1.73SQ M
HCT VFR BLD AUTO: 34.4 % (ref 36.5–49.3)
HGB BLD-MCNC: 11.3 G/DL (ref 12–17)
IMM GRANULOCYTES # BLD AUTO: 0.02 THOUSAND/UL (ref 0–0.2)
IMM GRANULOCYTES NFR BLD AUTO: 0 % (ref 0–2)
LYMPHOCYTES # BLD AUTO: 1.12 THOUSANDS/ÂΜL (ref 0.6–4.47)
LYMPHOCYTES NFR BLD AUTO: 19 % (ref 14–44)
MCH RBC QN AUTO: 30.1 PG (ref 26.8–34.3)
MCHC RBC AUTO-ENTMCNC: 32.8 G/DL (ref 31.4–37.4)
MCV RBC AUTO: 92 FL (ref 82–98)
MONOCYTES # BLD AUTO: 0.45 THOUSAND/ÂΜL (ref 0.17–1.22)
MONOCYTES NFR BLD AUTO: 8 % (ref 4–12)
NEUTROPHILS # BLD AUTO: 4.33 THOUSANDS/ÂΜL (ref 1.85–7.62)
NEUTS SEG NFR BLD AUTO: 71 % (ref 43–75)
NRBC BLD AUTO-RTO: 0 /100 WBCS
PLATELET # BLD AUTO: 357 THOUSANDS/UL (ref 149–390)
PMV BLD AUTO: 10.4 FL (ref 8.9–12.7)
RBC # BLD AUTO: 3.75 MILLION/UL (ref 3.88–5.62)
WBC # BLD AUTO: 6.02 THOUSAND/UL (ref 4.31–10.16)

## 2024-08-19 PROCEDURE — G0299 HHS/HOSPICE OF RN EA 15 MIN: HCPCS

## 2024-08-19 PROCEDURE — 82565 ASSAY OF CREATININE: CPT

## 2024-08-19 PROCEDURE — 36415 COLL VENOUS BLD VENIPUNCTURE: CPT

## 2024-08-19 PROCEDURE — 85025 COMPLETE CBC W/AUTO DIFF WBC: CPT

## 2024-08-21 ENCOUNTER — HOME CARE VISIT (OUTPATIENT)
Dept: HOME HEALTH SERVICES | Facility: HOME HEALTHCARE | Age: 69
End: 2024-08-21
Payer: COMMERCIAL

## 2024-08-21 VITALS
DIASTOLIC BLOOD PRESSURE: 76 MMHG | TEMPERATURE: 98.6 F | HEART RATE: 68 BPM | HEART RATE: 76 BPM | OXYGEN SATURATION: 98 % | DIASTOLIC BLOOD PRESSURE: 80 MMHG | OXYGEN SATURATION: 98 % | TEMPERATURE: 98.4 F | SYSTOLIC BLOOD PRESSURE: 122 MMHG | RESPIRATION RATE: 18 BRPM | RESPIRATION RATE: 18 BRPM | SYSTOLIC BLOOD PRESSURE: 120 MMHG

## 2024-08-21 PROCEDURE — G0300 HHS/HOSPICE OF LPN EA 15 MIN: HCPCS

## 2024-08-22 ENCOUNTER — OFFICE VISIT (OUTPATIENT)
Dept: VASCULAR SURGERY | Facility: CLINIC | Age: 69
End: 2024-08-22

## 2024-08-22 ENCOUNTER — OFFICE VISIT (OUTPATIENT)
Dept: INFECTIOUS DISEASES | Facility: CLINIC | Age: 69
End: 2024-08-22
Payer: COMMERCIAL

## 2024-08-22 VITALS
HEART RATE: 75 BPM | OXYGEN SATURATION: 95 % | RESPIRATION RATE: 17 BRPM | WEIGHT: 257 LBS | DIASTOLIC BLOOD PRESSURE: 62 MMHG | SYSTOLIC BLOOD PRESSURE: 140 MMHG | BODY MASS INDEX: 34.81 KG/M2 | TEMPERATURE: 97.5 F | HEIGHT: 72 IN

## 2024-08-22 DIAGNOSIS — I97.630 POSTOPERATIVE HEMATOMA INVOLVING CIRCULATORY SYSTEM FOLLOWING CARDIAC CATHETERIZATION: Primary | ICD-10-CM

## 2024-08-22 DIAGNOSIS — T14.8XXA INFECTED HEMATOMA: Primary | ICD-10-CM

## 2024-08-22 DIAGNOSIS — L08.9 INFECTED HEMATOMA: Primary | ICD-10-CM

## 2024-08-22 DIAGNOSIS — E66.9 CLASS 1 OBESITY IN ADULT: ICD-10-CM

## 2024-08-22 DIAGNOSIS — E11.9 TYPE 2 DIABETES MELLITUS (HCC): ICD-10-CM

## 2024-08-22 DIAGNOSIS — R78.81 BACTEREMIA: ICD-10-CM

## 2024-08-22 PROCEDURE — 99214 OFFICE O/P EST MOD 30 MIN: CPT | Performed by: STUDENT IN AN ORGANIZED HEALTH CARE EDUCATION/TRAINING PROGRAM

## 2024-08-22 PROCEDURE — 99024 POSTOP FOLLOW-UP VISIT: CPT | Performed by: NURSE PRACTITIONER

## 2024-08-22 RX ORDER — NYSTATIN 100000 [USP'U]/G
POWDER TOPICAL DAILY
Qty: 30 G | Refills: 1 | Status: SHIPPED | OUTPATIENT
Start: 2024-08-22
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
## 2024-08-22 NOTE — PATIENT INSTRUCTIONS
-continue IV cefazolin 2g every 8 hours through 9/12/24 (6 weeks)  -continue weekly labs on IV antibiotics   -follow up with vascular surgery  -Infectious disease follow up in 2 weeks
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
## 2024-08-22 NOTE — PROGRESS NOTES
Outpatient Progress Note - Infectious Disease   Juan Carlos Cleveland Jr. 68 y.o. male MRN: [id number]  Unit/Bed#:  Encounter: [id number]      Impression/Plan:    1. MSSA bacteremia.  Due to infected right groin hematoma.  No intravascular prosthetic devices present. TTE no vegetations (adequate study).  Repeat blood cultures no growth.  Recent labs show normal WBC count and stable creatinine.               -continue IV cefazolin 2 g every 8 hours              -Due to vascular involvement of infection, patient will require a 6-week course of IV cefazolin through 9/12/2024              -Weekly CBC with differential and creatinine on IV antibiotics              -ID follow up 2 weeks              -PICC removal after last dose IV antibiotics     2.  Infected right groin hematoma.  Following cardiac catheterization 7/26 with right femoral artery access.  Lower extremity arterial duplex showed a right groin hematoma, no pseudoaneurysm or AV fistula.  Status post right groin washout with vascular surgery 8/2/2024, tissue culture also growing MSSA.  Status post repeat washout 8/6, there was purulence exuding from the prior I&D site with tracking of the infection around the common femoral artery. Wound VAC in place, no purulence or signs of active infection              -Antibiotics as above              -Vascular surgery follow-up              -Monitor right groin site   -continue wound VAC, local wound care     3.  Type 2 diabetes mellitus, obesity.  Hemoglobin A1c 8%.  Risk factor for infection.  Glycemic control and weight management per PCP.    Above management plan discussed with the patient and all questions answered. ID follow up 2 weeks.     Antibiotics:  IV Cefazolin    Subjective:  The patient presents for follow up after admission to hospitals 8/2-8/9 for MSSA bacteremia 2/2 infected right groin hematoma with vascular follow up following prior cardiac catheterization. The patient is status post right groin I&D x 2. The  patient was discharged on a 6 week course of IV Cefazolin through 9/12/24.     Overall, the patient is feeling well. He is having some issues with the current wound VAC, the last VAC placed had been leaking and not holding a seal so he is seeing vascular surgery today. No fever, chills, groin pain, cellulitis, nausea, diarrhea. Per the patient visiting nurses have been stating the wound is healing well with purulence or signs of infection.    ROS:  A complete review of systems is negative other than that noted above in the subjective    Followup portions patient history reviewed and updated as:  Allergies, current medications, past medical history, past social history, past surgical history, and the problem list    Objective:  Vitals:  Vitals:    08/22/24 0911   BP: 140/62   Pulse: 75   Resp: 17   Temp: 97.5 °F (36.4 °C)   SpO2: 95%   Weight: 117 kg (257 lb)   Height: 6' (1.829 m)       Physical Exam:   General Appearance:  Alert, interactive, appearing well, nontoxic, no acute distress.   Throat: Oropharynx moist without lesions.    Lungs:   Clear to auscultation bilaterally; no audible wheezes, rhonchi or rales; respirations unlabored   Heart:  Regular rate, systolic murmur   Abdomen:   Soft, non-tender, non-distended, positive bowel sounds.     Extremities: Right groin woudn VAC, no cellulitis. RUE PICC   Skin: Skin irritation under VAC dressing in right groin, no rashes or cellulitis        Labs, Imaging, & Other studies:   All pertinent labs and imaging studies were personally reviewed    Labs:  Lab Results   Component Value Date    K 4.1 08/07/2024     08/07/2024    CO2 23 08/07/2024    BUN 21 08/07/2024    CREATININE 0.72 08/19/2024    CALCIUM 8.4 08/07/2024    CORRECTEDCA 9.2 08/02/2024    AST 23 08/02/2024    ALT 22 08/02/2024    ALKPHOS 100 08/02/2024    EGFR 95 08/19/2024     Lab Results   Component Value Date    WBC 6.02 08/19/2024    HGB 11.3 (L) 08/19/2024    HCT 34.4 (L) 08/19/2024    MCV 92  "08/19/2024     08/19/2024   No results found for: \"SEDRATE\"    "

## 2024-08-22 NOTE — PATIENT INSTRUCTIONS
"Continue wound VAC to right groin. Change every other day. Use \"dusting technique \"-  step 1 - apply skin prep to osvaldo wound right groin step 2 - apply nystatin powder to periwound and dust off excess powder with gauze step 3 - apply skin prep. Repeat step 1 and step 2 twice layering prep and nystatin powder. Last layer is skin prep then apply wound VAC as instructed. Call with any questions.   Follow up in 2-3 weeks to recheck wound healing   Post op arterial duplex in 3 months   "

## 2024-08-23 ENCOUNTER — OFFICE VISIT (OUTPATIENT)
Dept: CARDIOLOGY CLINIC | Facility: CLINIC | Age: 69
End: 2024-08-23
Payer: COMMERCIAL

## 2024-08-23 ENCOUNTER — HOME CARE VISIT (OUTPATIENT)
Dept: HOME HEALTH SERVICES | Facility: HOME HEALTHCARE | Age: 69
End: 2024-08-23
Payer: COMMERCIAL

## 2024-08-23 VITALS
OXYGEN SATURATION: 98 % | RESPIRATION RATE: 18 BRPM | DIASTOLIC BLOOD PRESSURE: 80 MMHG | TEMPERATURE: 98.4 F | SYSTOLIC BLOOD PRESSURE: 132 MMHG | HEART RATE: 76 BPM

## 2024-08-23 VITALS
OXYGEN SATURATION: 96 % | WEIGHT: 246.6 LBS | HEIGHT: 72 IN | BODY MASS INDEX: 33.4 KG/M2 | HEART RATE: 70 BPM | DIASTOLIC BLOOD PRESSURE: 72 MMHG | SYSTOLIC BLOOD PRESSURE: 134 MMHG

## 2024-08-23 DIAGNOSIS — I10 PRIMARY HYPERTENSION: ICD-10-CM

## 2024-08-23 DIAGNOSIS — I25.10 NONOBSTRUCTIVE ATHEROSCLEROSIS OF CORONARY ARTERY: ICD-10-CM

## 2024-08-23 DIAGNOSIS — R01.1 CARDIAC MURMUR: ICD-10-CM

## 2024-08-23 DIAGNOSIS — I35.0 NONRHEUMATIC AORTIC VALVE STENOSIS: ICD-10-CM

## 2024-08-23 DIAGNOSIS — E78.5 DYSLIPIDEMIA: ICD-10-CM

## 2024-08-23 DIAGNOSIS — R07.89 OTHER CHEST PAIN: ICD-10-CM

## 2024-08-23 PROCEDURE — 99214 OFFICE O/P EST MOD 30 MIN: CPT

## 2024-08-23 PROCEDURE — G0300 HHS/HOSPICE OF LPN EA 15 MIN: HCPCS

## 2024-08-23 NOTE — PROGRESS NOTES
Juan Carlos Cleveland Jr.  1955  [id number]  [facility] [place]  [address] [facility] [address]    [place] [address] [phone]    1. Other chest pain        2. Nonobstructive atherosclerosis of coronary artery        3. Cardiac murmur        4. Nonrheumatic aortic valve stenosis        5. Primary hypertension        6. Dyslipidemia            Summary/Discussion:  Chest pain:  - s/p hospital admission from 7/26/2024-7/27/2024   elevated troponin   no significant ischemic EKG changes noted  s/p LHC revealing minimal CAD to suggest NSTEMI  echo without significant wall motion abnormalities   per documentation from inpatient cardiology, unclear etiology for troponin elevation   - continue aspirin, statin, and zetia     Aortic stenosis:  - mild on recent echo, valve area 2.32 cm2  - continue surveillance management  - no clinical s/s of volume overload  continue lisinopril-HCTZ    Hypertension:  - stable, 134/72  - continue present medication regimen  - lifestyle modification   - close blood pressure monitoring     Dyslipidemia:  - Lipid Profile:    Latest Reference Range & Units 07/26/24 10:52   Cholesterol See Comment mg/dL 115   Triglycerides See Comment mg/dL 79   HDL >=40 mg/dL 50   LDL Calculated 0 - 100 mg/dL 49   - acceptable numbers-- continue statin and zetia   - encouraged low cholesterol, mediterranean diet, and annual lipid follow up    Hx of DVT:  - completed 6 months of anticoagulation with eliquis  - continue aspirin        Interval History: Juan Carlos Cleveland Jr. is a 68 y.o. year old male with history mentioned in problem list who presents to the office today for a hospital follow up.     He originally presented to the hospital on 7/26/2024 due to chest pain and elevated troponin 83, 234, 265.  EKG was without significant ischemic findings.  Patient was loaded with aspirin and Brilinta and started on heparin drip.  Cardiac cath was performed by cardiology on 7/26/2024  with no significant CAD to suggest NSTEMI.  Echo was also without any significant wall motion abnormalities or valvular abnormalities.  Patient cleared by cardiology for discharge on home regimen of aspirin 81 mg daily, home antihypertensive regiment, statin, and Tricor.     He was again admitted to the hospital from 8/2/2024-8/9/2024 for sepsis.    Since his hospital admissions he has no significant cardiac complaints. He denies  any chest pain/pressure/discomfort or shortness of breath. He denies lower extremity edema, orthopnea, and PND. He denies lightheadedness, dizziness, and syncope. He denies palpitations.        He will RTO in 6 months with Dr. Horton or sooner if necessary. He will call with any concerns.       Medical Problems       Problem List       Chest pain    History of DVT (deep vein thrombosis)    Localized swelling, mass and lump, right lower limb    Blood in stool    Other constipation    BRBPR (bright red blood per rectum)    Dysphagia    Elevated serum creatinine    Type 2 diabetes mellitus (HCC)      Lab Results   Component Value Date    HGBA1C 8.0 (H) 07/26/2024         Hypertension    Class 1 obesity in adult    Chronic bilateral low back pain without sciatica    Cardiac murmur    Fever    Hematoma    Sepsis (HCC)    Overview Deleted 8/2/2024  3:09 PM by Juan Boggs MD MPH            Postoperative hematoma involving circulatory system following cardiac catheterization        Past Medical History:   Diagnosis Date    Diabetes mellitus ([facility])     boaderline    DVT (deep venous thrombosis) ([facility])     History of blood clots right leg      june 23 2020    Hypertension     PONV (postoperative nausea and vomiting)     nausea after shoulder surgery     Venous stasis dermatitis of right lower extremity 06/24/2020     Social History     Socioeconomic History    Marital status: /Civil Union     Spouse name: Not on file    Number of children: Not on file    Years of education: Not on file     Highest education level: Not on file   Occupational History    Not on file   Tobacco Use    Smoking status: Never    Smokeless tobacco: Never   Vaping Use    Vaping status: Never Used   Substance and Sexual Activity    Alcohol use: No    Drug use: No    Sexual activity: Not on file   Other Topics Concern    Not on file   Social History Narrative    Not on file     Social Determinants of Health     Financial Resource Strain: Low Risk  (5/8/2024)    Received from [facility]    Overall Financial Resource Strain (CARDIA)     Difficulty of Paying Living Expenses: Not very hard   Food Insecurity: No Food Insecurity (8/5/2024)    Hunger Vital Sign     Worried About Running Out of Food in the Last Year: Never true     Ran Out of Food in the Last Year: Never true   Transportation Needs: No Transportation Needs (8/5/2024)    PRAPARE - Transportation     Lack of Transportation (Medical): No     Lack of Transportation (Non-Medical): No   Physical Activity: Not on file   Stress: Not on file   Social Connections: Not on file   Intimate Partner Violence: Not At Risk (5/8/2024)    Received from [facility]    Humiliation, Afraid, Rape, and Kick questionnaire     Fear of Current or Ex-Partner: No     Emotionally Abused: No     Physically Abused: No     Sexually Abused: No   Housing Stability: Low Risk  (8/5/2024)    Housing Stability Vital Sign     Unable to Pay for Housing in the Last Year: No     Number of Times Moved in the Last Year: 0     Homeless in the Last Year: No   Recent Concern: Housing Stability - High Risk (8/1/2024)    Housing Stability Vital Sign     Unable to Pay for Housing in the Last Year: Yes     Number of Times Moved in the Last Year: Not on file     Homeless in the Last Year: Yes      History reviewed. No pertinent family history.  Past Surgical History:   Procedure Laterality Date    APPENDECTOMY      BACK SURGERY      CARDIAC CATHETERIZATION N/A 7/26/2024    Procedure:  Cardiac Coronary Angiogram;  Surgeon: Raymond Noriega MD;  [facility];  Service: Cardiology    COLONOSCOPY  11/19/2016    ESOPHAGOGASTRODUODENOSCOPY      ESOPHAGOGASTRODUODENOSCOPY N/A 10/26/2016    Procedure: ESOPHAGOGASTRODUODENOSCOPY (EGD);  Surgeon: Tito Rutherford MD;  Location: [facility];  Service:     OK COLONOSCOPY FLX DX W/COLLJ SPEC WHEN PFRMD N/A 11/19/2016    Procedure: COLONOSCOPY;  Surgeon: Sandor Hay MD;  Location: [facility];  Service: Gastroenterology    OK EGD BALLOON DILATION ESOPHAGUS <30 MM DIAM N/A 10/26/2016    Procedure: BALLOON DILATION ;  Surgeon: Tito Rutherford MD;  Location: [facility];  Service: Gastroenterology    OK SURGICAL ARTHROSCOPY SHOULDER W/ROTATOR CUFF RPR Left 04/21/2016    Procedure: LEFT SHOULDER ARTHROSCOPIC ROTATOR CUFF REPAIR, SUBACROMIAL DECOMPRESSION, BICEPS TENODESIS;  Surgeon: Amado Avelar MD;  Location: [facility];  Service: Orthopedics    OK TENDON SHEATH INCISION Right 11/15/2016    Procedure: INDEX TRIGGER FINGER RELEASE ;  Surgeon: Todd Silva MD;  Location: [facility];  Service: Orthopedics    WOUND DEBRIDEMENT Right 8/2/2024    Procedure: DEBRIDEMENT LOWER EXTREMITY (WASH OUT);  Surgeon: Jenifer Zee MD;  Location: [facility];  Service: Vascular    WOUND DEBRIDEMENT Right 8/6/2024    Procedure: Right groin exploration, washout, debridement of infected subcutaneous tissue and artery, arterial exploration, primary closure, deep tissue closure, VAC placement;  Surgeon: Jasmin Denny MD;  Location: [facility];  Service: Vascular       Current Outpatient Medications:     aspirin (ECOTRIN LOW STRENGTH) 81 mg EC tablet, Take 1 tablet (81 mg total) by mouth daily, Disp: 360 tablet, Rfl: 0    ceFAZolin (ANCEF) 2000 mg IVPB, Inject 2,000 mg into a catheter in a vein over 30 minutes at 100 mL/hr every 8 (eight) hours, Disp: , Rfl:     ezetimibe (ZETIA) 10 mg tablet, Take 10 mg by mouth daily, Disp: , Rfl:      lisinopril-hydrochlorothiazide (PRINZIDE,ZESTORETIC) 20-25 MG per tablet, Take 2 tablets by mouth daily Patient taking 2 tablets daily as instructed on prescription , Disp: , Rfl:     Multiple Vitamins-Minerals (CENTRUM SILVER PO), Take 1 tablet by mouth daily., Disp: , Rfl:     Nutritional Supplements (ENSURE PO), Take by mouth in the morning, Disp: , Rfl:     nystatin (MYCOSTATIN) powder, Apply topically in the morning, Disp: 30 g, Rfl: 1    pioglitazone-metFORMIN (ACTOPLUS MET)  MG per tablet, Take 1 tablet by mouth 2 (two) times a day with meals, Disp: 60 tablet, Rfl: 0    Probiotic, Lactobacillus, CAPS, Take 1 capsule by mouth in the morning. [facility] 100 million organism, Disp: , Rfl:     rosuvastatin (CRESTOR) 20 MG tablet, Take 20 mg by mouth daily, Disp: , Rfl:     sodium chloride, PF, 0.9 %, Inject 10 mL into a catheter in a vein every 8 (eight) hours. before and after IV antibiotic infusion, Disp: , Rfl:     tadalafil (CIALIS) 5 MG tablet, Take 5 mg by mouth daily as needed for erectile dysfunction., Disp: , Rfl:     acetaminophen (TYLENOL) 325 mg tablet, Take 3 tablets (975 mg total) by mouth every 8 (eight) hours (Patient not taking: Reported on 8/22/2024), Disp: , Rfl:     APPLE CIDER VINEGAR PO, Take 480 mg by mouth in the morning. (Patient not taking: Reported on 8/22/2024), Disp: , Rfl:     bisacodyl (DULCOLAX) 5 mg EC tablet, Take 2 tablets (10 mg total) by mouth once for 1 dose (Patient not taking: Reported on 8/22/2024), Disp: 2 tablet, Rfl: 0    Chromium-Cinnamon (Cinnamon Plus Chromium) 200-1000 MCG-MG CAPS, Take 2,000 mg by mouth 2 (two) times a day. (Patient not taking: Reported on 8/22/2024), Disp: , Rfl:     cyanocobalamin (VITAMIN B-12) 100 MCG tablet, Take 1,000 mcg by mouth daily. (Patient not taking: Reported on 8/22/2024), Disp: , Rfl:     folic acid (FOLVITE) 400 mcg tablet, Take 400 mcg by mouth daily. (Patient not taking: Reported on 8/22/2024), Disp: , Rfl:     Ginger,  "Zingiber officinalis, (CHRISTIANO ROOT PO), Take 1,500 mg by mouth in the morning. (Patient not taking: Reported on 8/22/2024), Disp: , Rfl:     Magnesium 400 MG TABS, Take 1 tablet by mouth in the morning. (Patient not taking: Reported on 8/22/2024), Disp: , Rfl:     Omega-3 Fatty Acids (FISH OIL PO), Take 2,400 mg by mouth every other day. (Patient not taking: Reported on 8/22/2024), Disp: , Rfl:     Potassium 99 MG TABS, Take 1 tablet by mouth in the morning. (Patient not taking: Reported on 8/22/2024), Disp: , Rfl:     Tart Cherry 1200 MG CAPS, Take 1 capsule by mouth in the morning. (Patient not taking: Reported on 8/22/2024), Disp: , Rfl:     TURMERIC PO, Take 1,000 mg by mouth in the morning. (Patient not taking: Reported on 8/22/2024), Disp: , Rfl:   Allergies   Allergen Reactions    Tetanus Toxoid Abdominal Pain    Tetanus Toxoids     Tetanus-Diphtheria Toxoids Td Swelling    Fenofibrate Hives, Abdominal Pain and Rash     Generic        Labs:     Chemistry        Component Value Date/Time    K 4.1 08/07/2024 0443    K 4.1 05/08/2024 0423     08/07/2024 0443     05/08/2024 0423    CO2 23 08/07/2024 0443    CO2 22 05/08/2024 0423    BUN 21 08/07/2024 0443    BUN 22 05/08/2024 0423    CREATININE 0.76 08/26/2024 0935    CREATININE 0.93 05/08/2024 0423        Component Value Date/Time    CALCIUM 8.4 08/07/2024 0443    CALCIUM 9.0 05/08/2024 0423    ALKPHOS 100 08/02/2024 0452    ALKPHOS 40 04/22/2024 0657    AST 23 08/02/2024 0452    AST 16 04/22/2024 0657    ALT 22 08/02/2024 0452    ALT 15 04/22/2024 0657            No results found for: \"CHOL\"  Lab Results   Component Value Date    HDL 50 07/26/2024     Lab Results   Component Value Date    LDLCALC 49 07/26/2024     Lab Results   Component Value Date    TRIG 79 07/26/2024     No results found for: \"CHOLHDL\"    Imaging: Echo follow up/limited w/ contrast if indicated    Result Date: 8/4/2024  Narrative:   Left Ventricle: Left ventricular cavity size is " normal. Wall thickness is mildly increased. The left ventricular ejection fraction is 65%. Systolic function is normal. Wall motion is normal.   Right Ventricle: Right ventricular cavity size is upper normal. Systolic function is normal.   Aortic Valve: The aortic valve is trileaflet. The leaflets are moderately calcified. There is mildly reduced mobility. There is mild stenosis.   Mitral Valve: There is mild annular calcification. No significant change compared to prior study.     XR chest pa & lateral    Result Date: 8/1/2024  Narrative: XR CHEST PA & LATERAL INDICATION: Lung evaluation. COMPARISON: 7/26/2024 FINDINGS: Clear lungs. No pneumothorax or pleural effusion. Normal cardiomediastinal silhouette. Age-related degenerative changes in the spine. Normal upper abdomen.     Impression: No acute cardiopulmonary disease. Workstation performed: [id number]     VAS DUPLEX EVALUATION FOR PSEUDOANEURYSM    Result Date: 8/1/2024  Narrative:  [facility] REPORT CLINICAL: Indications:  Patient presents with right groin pain with erythema and lump s/p cardiac cath on 07/26/24. Physician wants to rule out pseudoaneurysm. Operative History: 2024-07-26 Cardiac catheterization Risk Factors The patient has history of HTN and Diabetes (Yes).  He has no history of Hyperlipidemia.  FINDINGS:  Right                  PSV (cm/s)  Dist EIA                      180  Common Femoral Artery         119  Prox Profunda                  46  Proximal Pop                   92  Dist Post Tibial               99  Dist. Ant. Tibial              51     CONCLUSION:  Impression: TECHNICAL FINDINGS:  RIGHT LOWER EXTREMITY: Widely patent common femoral and external iliac arteries with no evidence of active pseudoaneurysm.  The common femoral and external iliac veins are patent with no evidence of an arteriovenous fistula.  There is a non-vascular structure at the groin measuring approximately 5.0 cm suggestive of hematoma.  Tech Note: There  are multiple echogenic structure(s) located in the Right inguinal region measuring approximately 2.3 cm suggestive of enlarged lymphatic channels.  SIGNATURE: Electronically Signed by: ANNITA HEARD MD on 2024-08-01 04:31:02 PM    Cardiac catheterization    Result Date: 7/27/2024  Narrative:   Minimal CAD     Echo complete w/ contrast if indicated    Result Date: 7/26/2024  Narrative:   Left Ventricle: Left ventricular cavity size is normal. Wall thickness is mildly increased. The left ventricular ejection fraction is 65%. Systolic function is normal. Wall motion is normal. Diastolic function is normal for age.  Left atrial filling pressure is normal.   Right Ventricle: Systolic function is normal.   Aortic Valve: The aortic valve is trileaflet. The leaflets are moderately calcified. There is mildly reduced mobility. There is mild stenosis. The aortic valve mean gradient is 15 mmHg. The aortic valve area is 1.83 cm2.   Mitral Valve: There is mild annular calcification.     XR chest portable    Result Date: 7/26/2024  Narrative: XR CHEST PORTABLE INDICATION: Back and chest pain. NSTEMI. COMPARISON:  image CT supplied 6/23/2020. FINDINGS: Monitoring leads and clips project over the chest. Clear lungs. No pneumothorax or pleural effusion. Normal cardiomediastinal silhouette. Bones are unremarkable for age. Normal upper abdomen.     Impression: No acute cardiopulmonary disease. Resident: Edinson Rivera I, the attending radiologist, have reviewed the images and agree with the final report above. Workstation performed: [id number]       ECG:  n/a    Review of Systems   Constitutional: Negative.       Vitals:    08/23/24 1525   BP: 134/72   Pulse: 70   SpO2: 96%     Vitals:    08/23/24 1525   Weight: 112 kg (246 lb 9.6 oz)     Height: 6' (182.9 cm)   Body mass index is 33.44 kg/m².    Physical Exam  Vitals and nursing note reviewed.   Constitutional:       General: He is not in acute distress.  HENT:      Head:  Normocephalic.      Nose: Nose normal.      Mouth/Throat:      Mouth: Mucous membranes are moist.      Pharynx: Oropharynx is clear.   Cardiovascular:      Rate and Rhythm: Normal rate and regular rhythm.      Heart sounds: Murmur heard.   Pulmonary:      Effort: Pulmonary effort is normal.      Breath sounds: Normal breath sounds.   Musculoskeletal:      Cervical back: Normal range of motion.      Right lower leg: Edema (trace) present.      Left lower leg: Edema (trace) present.   Skin:     General: Skin is warm and dry.   Neurological:      Mental Status: He is alert and oriented to person, place, and time.   Psychiatric:         Mood and Affect: Mood normal.         Behavior: Behavior normal.

## 2024-08-26 ENCOUNTER — HOME CARE VISIT (OUTPATIENT)
Dept: HOME HEALTH SERVICES | Facility: HOME HEALTHCARE | Age: 69
End: 2024-08-26
Payer: COMMERCIAL

## 2024-08-26 ENCOUNTER — LAB REQUISITION (OUTPATIENT)
Dept: LAB | Facility: HOSPITAL | Age: 69
End: 2024-08-26
Payer: COMMERCIAL

## 2024-08-26 VITALS
RESPIRATION RATE: 16 BRPM | TEMPERATURE: 97.1 F | HEART RATE: 58 BPM | OXYGEN SATURATION: 96 % | SYSTOLIC BLOOD PRESSURE: 114 MMHG | DIASTOLIC BLOOD PRESSURE: 68 MMHG

## 2024-08-26 DIAGNOSIS — T14.8XXA OTHER INJURY OF UNSPECIFIED BODY REGION, INITIAL ENCOUNTER: ICD-10-CM

## 2024-08-26 DIAGNOSIS — L08.9 LOCAL INFECTION OF THE SKIN AND SUBCUTANEOUS TISSUE, UNSPECIFIED: ICD-10-CM

## 2024-08-26 LAB
BASOPHILS # BLD AUTO: 0.03 THOUSANDS/ÂΜL (ref 0–0.1)
BASOPHILS NFR BLD AUTO: 1 % (ref 0–1)
CREAT SERPL-MCNC: 0.76 MG/DL (ref 0.6–1.3)
EOSINOPHIL # BLD AUTO: 0.13 THOUSAND/ÂΜL (ref 0–0.61)
EOSINOPHIL NFR BLD AUTO: 3 % (ref 0–6)
ERYTHROCYTE [DISTWIDTH] IN BLOOD BY AUTOMATED COUNT: 15.2 % (ref 11.6–15.1)
GFR SERPL CREATININE-BSD FRML MDRD: 93 ML/MIN/1.73SQ M
HCT VFR BLD AUTO: 35.2 % (ref 36.5–49.3)
HGB BLD-MCNC: 11.2 G/DL (ref 12–17)
IMM GRANULOCYTES # BLD AUTO: 0.01 THOUSAND/UL (ref 0–0.2)
IMM GRANULOCYTES NFR BLD AUTO: 0 % (ref 0–2)
LYMPHOCYTES # BLD AUTO: 0.93 THOUSANDS/ÂΜL (ref 0.6–4.47)
LYMPHOCYTES NFR BLD AUTO: 20 % (ref 14–44)
MCH RBC QN AUTO: 29.8 PG (ref 26.8–34.3)
MCHC RBC AUTO-ENTMCNC: 31.8 G/DL (ref 31.4–37.4)
MCV RBC AUTO: 94 FL (ref 82–98)
MONOCYTES # BLD AUTO: 0.35 THOUSAND/ÂΜL (ref 0.17–1.22)
MONOCYTES NFR BLD AUTO: 8 % (ref 4–12)
NEUTROPHILS # BLD AUTO: 3.17 THOUSANDS/ÂΜL (ref 1.85–7.62)
NEUTS SEG NFR BLD AUTO: 68 % (ref 43–75)
NRBC BLD AUTO-RTO: 0 /100 WBCS
PLATELET # BLD AUTO: 290 THOUSANDS/UL (ref 149–390)
PMV BLD AUTO: 10.7 FL (ref 8.9–12.7)
RBC # BLD AUTO: 3.76 MILLION/UL (ref 3.88–5.62)
WBC # BLD AUTO: 4.62 THOUSAND/UL (ref 4.31–10.16)

## 2024-08-26 PROCEDURE — 85025 COMPLETE CBC W/AUTO DIFF WBC: CPT | Performed by: STUDENT IN AN ORGANIZED HEALTH CARE EDUCATION/TRAINING PROGRAM

## 2024-08-26 PROCEDURE — G0299 HHS/HOSPICE OF RN EA 15 MIN: HCPCS

## 2024-08-26 PROCEDURE — 82565 ASSAY OF CREATININE: CPT | Performed by: STUDENT IN AN ORGANIZED HEALTH CARE EDUCATION/TRAINING PROGRAM

## 2024-08-28 ENCOUNTER — HOME CARE VISIT (OUTPATIENT)
Dept: HOME HEALTH SERVICES | Facility: HOME HEALTHCARE | Age: 69
End: 2024-08-28
Payer: COMMERCIAL

## 2024-08-28 VITALS
RESPIRATION RATE: 18 BRPM | OXYGEN SATURATION: 98 % | DIASTOLIC BLOOD PRESSURE: 76 MMHG | TEMPERATURE: 97.4 F | HEART RATE: 66 BPM | SYSTOLIC BLOOD PRESSURE: 124 MMHG

## 2024-08-28 PROCEDURE — G0299 HHS/HOSPICE OF RN EA 15 MIN: HCPCS

## 2024-08-30 ENCOUNTER — HOME CARE VISIT (OUTPATIENT)
Dept: HOME HEALTH SERVICES | Facility: HOME HEALTHCARE | Age: 69
End: 2024-08-30
Payer: COMMERCIAL

## 2024-08-30 VITALS
SYSTOLIC BLOOD PRESSURE: 114 MMHG | RESPIRATION RATE: 20 BRPM | OXYGEN SATURATION: 98 % | TEMPERATURE: 97.9 F | DIASTOLIC BLOOD PRESSURE: 60 MMHG | HEART RATE: 61 BPM

## 2024-08-30 PROCEDURE — G0299 HHS/HOSPICE OF RN EA 15 MIN: HCPCS

## 2024-08-31 PROBLEM — R50.9 FEVER: Status: RESOLVED | Noted: 2024-08-01 | Resolved: 2024-08-31

## 2024-09-01 PROBLEM — A41.9 SEPSIS (HCC): Status: RESOLVED | Noted: 2024-08-02 | Resolved: 2024-09-01

## 2024-09-02 ENCOUNTER — HOME CARE VISIT (OUTPATIENT)
Dept: HOME HEALTH SERVICES | Facility: HOME HEALTHCARE | Age: 69
End: 2024-09-02
Payer: COMMERCIAL

## 2024-09-02 ENCOUNTER — LAB REQUISITION (OUTPATIENT)
Dept: LAB | Facility: HOSPITAL | Age: 69
End: 2024-09-02
Payer: COMMERCIAL

## 2024-09-02 DIAGNOSIS — L76.32 POSTPROCEDURAL HEMATOMA OF SKIN AND SUBCUTANEOUS TISSUE FOLLOWING OTHER PROCEDURE: ICD-10-CM

## 2024-09-02 LAB
BASOPHILS # BLD AUTO: 0.03 THOUSANDS/ÂΜL (ref 0–0.1)
BASOPHILS NFR BLD AUTO: 1 % (ref 0–1)
CREAT SERPL-MCNC: 0.75 MG/DL (ref 0.6–1.3)
EOSINOPHIL # BLD AUTO: 0.12 THOUSAND/ÂΜL (ref 0–0.61)
EOSINOPHIL NFR BLD AUTO: 2 % (ref 0–6)
ERYTHROCYTE [DISTWIDTH] IN BLOOD BY AUTOMATED COUNT: 14.9 % (ref 11.6–15.1)
GFR SERPL CREATININE-BSD FRML MDRD: 94 ML/MIN/1.73SQ M
HCT VFR BLD AUTO: 34.3 % (ref 36.5–49.3)
HGB BLD-MCNC: 11.2 G/DL (ref 12–17)
IMM GRANULOCYTES # BLD AUTO: 0.02 THOUSAND/UL (ref 0–0.2)
IMM GRANULOCYTES NFR BLD AUTO: 0 % (ref 0–2)
LYMPHOCYTES # BLD AUTO: 0.98 THOUSANDS/ÂΜL (ref 0.6–4.47)
LYMPHOCYTES NFR BLD AUTO: 19 % (ref 14–44)
MCH RBC QN AUTO: 29.9 PG (ref 26.8–34.3)
MCHC RBC AUTO-ENTMCNC: 32.7 G/DL (ref 31.4–37.4)
MCV RBC AUTO: 92 FL (ref 82–98)
MONOCYTES # BLD AUTO: 0.48 THOUSAND/ÂΜL (ref 0.17–1.22)
MONOCYTES NFR BLD AUTO: 9 % (ref 4–12)
NEUTROPHILS # BLD AUTO: 3.55 THOUSANDS/ÂΜL (ref 1.85–7.62)
NEUTS SEG NFR BLD AUTO: 69 % (ref 43–75)
NRBC BLD AUTO-RTO: 0 /100 WBCS
PLATELET # BLD AUTO: 248 THOUSANDS/UL (ref 149–390)
PMV BLD AUTO: 10.7 FL (ref 8.9–12.7)
RBC # BLD AUTO: 3.75 MILLION/UL (ref 3.88–5.62)
WBC # BLD AUTO: 5.18 THOUSAND/UL (ref 4.31–10.16)

## 2024-09-02 PROCEDURE — G0299 HHS/HOSPICE OF RN EA 15 MIN: HCPCS

## 2024-09-02 PROCEDURE — 85025 COMPLETE CBC W/AUTO DIFF WBC: CPT | Performed by: STUDENT IN AN ORGANIZED HEALTH CARE EDUCATION/TRAINING PROGRAM

## 2024-09-02 PROCEDURE — 82565 ASSAY OF CREATININE: CPT | Performed by: STUDENT IN AN ORGANIZED HEALTH CARE EDUCATION/TRAINING PROGRAM

## 2024-09-03 ENCOUNTER — TELEPHONE (OUTPATIENT)
Dept: VASCULAR SURGERY | Facility: CLINIC | Age: 69
End: 2024-09-03

## 2024-09-03 NOTE — TELEPHONE ENCOUNTER
I called patient to inform him we are cancelling his appointment on 09/05/2024 with Jacque lanacster but patient did not  and did not have a voice mail set up.

## 2024-09-04 ENCOUNTER — HOME CARE VISIT (OUTPATIENT)
Dept: HOME HEALTH SERVICES | Facility: HOME HEALTHCARE | Age: 69
End: 2024-09-04
Payer: COMMERCIAL

## 2024-09-04 ENCOUNTER — TELEPHONE (OUTPATIENT)
Dept: VASCULAR SURGERY | Facility: HOSPITAL | Age: 69
End: 2024-09-04

## 2024-09-04 VITALS
DIASTOLIC BLOOD PRESSURE: 64 MMHG | TEMPERATURE: 97.1 F | RESPIRATION RATE: 16 BRPM | HEART RATE: 62 BPM | SYSTOLIC BLOOD PRESSURE: 110 MMHG | OXYGEN SATURATION: 100 %

## 2024-09-04 PROCEDURE — G0299 HHS/HOSPICE OF RN EA 15 MIN: HCPCS

## 2024-09-04 NOTE — TELEPHONE ENCOUNTER
Caller: Juan Carlos Cleveland    Doctor: Dr. Zee/ Odilia Ogden    Reason for call: Pt inquiring about r/s appt date. Informed the pt it was still being worked on. Pt informed me he no longer has the VAC and that he would prefer to be scheduled at the [facility] office if possible. Please contact the pt with a new appt.    Call back#: [phone]

## 2024-09-04 NOTE — TELEPHONE ENCOUNTER
Caller: Juan Carlos Cunningham    Doctor: Jacque Huggins    Reason for call: Patient is calling to reschedule appointment. I spoke with Rachel and informed patient that we will be calling him back to reschedule.    Call back#: [phone]

## 2024-09-04 NOTE — TELEPHONE ENCOUNTER
Called pt and lmom to inform them that JAM will not be in the office for their appt 9/5 in [place] and we are calling to reschedule

## 2024-09-05 ENCOUNTER — HOME CARE VISIT (OUTPATIENT)
Dept: HOME HEALTH SERVICES | Facility: HOME HEALTHCARE | Age: 69
End: 2024-09-05
Payer: COMMERCIAL

## 2024-09-05 NOTE — TELEPHONE ENCOUNTER
Caller: Juan Carlos Cleveland    Doctor: Dr. Zee/ Odilia Ogden    Reason for call: Pt called to inquire about the rescheduling of his cancelled appt. Informed the pt it was still being worked on. Pt stated he will be out today and to leave a VM on his phone with any appt information.     Call back#: [phone]

## 2024-09-06 ENCOUNTER — HOME CARE VISIT (OUTPATIENT)
Dept: HOME HEALTH SERVICES | Facility: HOME HEALTHCARE | Age: 69
End: 2024-09-06
Payer: COMMERCIAL

## 2024-09-06 VITALS
DIASTOLIC BLOOD PRESSURE: 62 MMHG | OXYGEN SATURATION: 97 % | SYSTOLIC BLOOD PRESSURE: 101 MMHG | RESPIRATION RATE: 16 BRPM | HEART RATE: 65 BPM | TEMPERATURE: 97.3 F

## 2024-09-09 ENCOUNTER — LAB REQUISITION (OUTPATIENT)
Dept: LAB | Facility: HOSPITAL | Age: 69
End: 2024-09-09
Payer: COMMERCIAL

## 2024-09-09 ENCOUNTER — HOME CARE VISIT (OUTPATIENT)
Dept: HOME HEALTH SERVICES | Facility: HOME HEALTHCARE | Age: 69
End: 2024-09-09
Payer: COMMERCIAL

## 2024-09-09 DIAGNOSIS — R78.81 BACTEREMIA: ICD-10-CM

## 2024-09-09 DIAGNOSIS — T14.8XXA OTHER INJURY OF UNSPECIFIED BODY REGION, INITIAL ENCOUNTER: ICD-10-CM

## 2024-09-09 LAB
BASOPHILS # BLD AUTO: 0.04 THOUSANDS/ÂΜL (ref 0–0.1)
BASOPHILS NFR BLD AUTO: 1 % (ref 0–1)
CREAT SERPL-MCNC: 0.7 MG/DL (ref 0.6–1.3)
EOSINOPHIL # BLD AUTO: 0.07 THOUSAND/ÂΜL (ref 0–0.61)
EOSINOPHIL NFR BLD AUTO: 2 % (ref 0–6)
ERYTHROCYTE [DISTWIDTH] IN BLOOD BY AUTOMATED COUNT: 14.8 % (ref 11.6–15.1)
GFR SERPL CREATININE-BSD FRML MDRD: 96 ML/MIN/1.73SQ M
HCT VFR BLD AUTO: 34.4 % (ref 36.5–49.3)
HGB BLD-MCNC: 11.5 G/DL (ref 12–17)
IMM GRANULOCYTES # BLD AUTO: 0.01 THOUSAND/UL (ref 0–0.2)
IMM GRANULOCYTES NFR BLD AUTO: 0 % (ref 0–2)
LYMPHOCYTES # BLD AUTO: 0.9 THOUSANDS/ÂΜL (ref 0.6–4.47)
LYMPHOCYTES NFR BLD AUTO: 20 % (ref 14–44)
MCH RBC QN AUTO: 30.6 PG (ref 26.8–34.3)
MCHC RBC AUTO-ENTMCNC: 33.4 G/DL (ref 31.4–37.4)
MCV RBC AUTO: 92 FL (ref 82–98)
MONOCYTES # BLD AUTO: 0.38 THOUSAND/ÂΜL (ref 0.17–1.22)
MONOCYTES NFR BLD AUTO: 9 % (ref 4–12)
NEUTROPHILS # BLD AUTO: 3.04 THOUSANDS/ÂΜL (ref 1.85–7.62)
NEUTS SEG NFR BLD AUTO: 68 % (ref 43–75)
NRBC BLD AUTO-RTO: 0 /100 WBCS
PLATELET # BLD AUTO: 288 THOUSANDS/UL (ref 149–390)
PMV BLD AUTO: 10.5 FL (ref 8.9–12.7)
RBC # BLD AUTO: 3.76 MILLION/UL (ref 3.88–5.62)
WBC # BLD AUTO: 4.44 THOUSAND/UL (ref 4.31–10.16)

## 2024-09-09 PROCEDURE — 10330064 DRESSING, ADAPTIC 3X3" (50/BX)"

## 2024-09-09 PROCEDURE — 85025 COMPLETE CBC W/AUTO DIFF WBC: CPT | Performed by: STUDENT IN AN ORGANIZED HEALTH CARE EDUCATION/TRAINING PROGRAM

## 2024-09-09 PROCEDURE — 10330064 PAD, ABD 5X9 STR LF (1/PK 20PK/BX) MGM1"

## 2024-09-09 PROCEDURE — G0299 HHS/HOSPICE OF RN EA 15 MIN: HCPCS

## 2024-09-09 PROCEDURE — 82565 ASSAY OF CREATININE: CPT | Performed by: STUDENT IN AN ORGANIZED HEALTH CARE EDUCATION/TRAINING PROGRAM

## 2024-09-10 ENCOUNTER — TELEPHONE (OUTPATIENT)
Dept: VASCULAR SURGERY | Facility: CLINIC | Age: 69
End: 2024-09-10

## 2024-09-10 VITALS
RESPIRATION RATE: 16 BRPM | OXYGEN SATURATION: 98 % | SYSTOLIC BLOOD PRESSURE: 114 MMHG | HEART RATE: 54 BPM | DIASTOLIC BLOOD PRESSURE: 64 MMHG | TEMPERATURE: 97.1 F

## 2024-09-10 NOTE — TELEPHONE ENCOUNTER
Spoke with pt and informed them that [facility] will not be in the office for their appt 9/13 and we will give them a call to reschedule. Pt stated wound vac was taken off Wednesday and he is going to be waiting for a call back

## 2024-09-11 NOTE — PROGRESS NOTES
Outpatient Progress Note - [facility] Infectious Disease   Juan Carlos Cleveland Jr. 68 y.o. male MRN: [id number]  Encounter: [id number]    Impression/Plan:  1. MSSA bacteremia.  Due to infected right groin hematoma.  No intravascular prosthetic devices present. TTE no vegetations (adequate study).  Repeat blood cultures no growth.  Most recent labs from 9/9 reviewed and demonstrated WBC WNL at 4.4, platelets WNL at 288k, creatinine WNL at 0.70.   -complete IV cefazolin 2g q8h today (9/12/2024)  -Due to vascular involvement of infection, patient required a 6-week course of IV cefazolin through 9/12/2024  -PICC removal after last dose IV antibiotics. Orders placed today  -RTC PRN     2.  Infected right groin hematoma.  Following cardiac catheterization 7/26 with right femoral artery access.  Lower extremity arterial duplex showed a right groin hematoma, no pseudoaneurysm or AV fistula.  Status post right groin washout with vascular surgery 8/2/2024, tissue culture also growing MSSA.  Status post repeat washout 8/6, there was purulence exuding from the prior I&D site with tracking of the infection around the common femoral artery. Wound VAC removed, no purulence or signs of active infection.   -Antibiotics as above  -Vascular surgery follow-up  -Monitor right groin site  -local wound care     3.  Type 2 diabetes mellitus, obesity.  Hemoglobin A1c 8%.  Risk factor for infection.  Glycemic control and weight management per PCP.    Antibiotics:  Cefazolin    Subjective:  The patient presents for follow up after admission to [facility]"" 8/2-8/9 for MSSA bacteremia 2/2 infected right groin hematoma with vascular follow up following prior cardiac catheterization. The patient is status post right groin I&D x 2. The patient was discharged on a 6 week course of IV Cefazolin through 9/12/24.     Wound vac removed a week ago. Nurse on Monday looked at wound and said it looked well. Patient's wife states that it looks well when she changes  the bandages. Has not had an appointment again with vascular surgery due to cancellations.  No drainage from the wound. No purulence. States there is a yellow tinge on the gauze. No pain in right groin.     No fever, no chills. No N/V/D, rashes, abdominal pain. Tolerating IV antibiotic well. States his appetite is finally back. No missed doses of antibiotic. No new symptoms.    Objective:  Vitals:    Vitals:    09/12/24 1021   BP: 125/80   Pulse: 75   Resp: 17   Temp: (!) 97.3 °F (36.3 °C)   SpO2: 97%       Physical Exam:   General Appearance:  Alert, interactive, nontoxic, no acute distress. Sitting upright on exam table. Appears comfortable.    Throat: Oropharynx moist without lesions.    Lungs:   Clear to auscultation bilaterally; no wheezes, rhonchi or rales; respirations unlabored on room air.   Heart:  RRR; +murmur, rub or gallop.   Abdomen:   Soft, non-tender, non-distended, positive bowel sounds.     Extremities: No clubbing or cyanosis. Trace edema of bilateral lower extremities.    Skin: No new rashes, lesions, or draining wounds noted on exposed skin. RUE PICC site CDI. Right groin with some surrounding skin irritation, but otherwise no evidence of surrounding cellulitis or purulent drainage expressed. No pain with palpation per patient.      Labs, Imaging, & Other studies:   All pertinent labs and imaging studies were personally reviewed by me including  Results from last 7 days   Lab Units 09/09/24  1230   WBC Thousand/uL 4.44   HEMOGLOBIN g/dL 11.5*   PLATELETS Thousands/uL 288     Results from last 7 days   Lab Units 09/09/24  1230   CREATININE mg/dL 0.70   EGFR ml/min/1.73sq m 96             Imaging Studies:  I have personally reviewed pertinent imaging study reports and images in PACS.     Ember Moore PA-C  [facility]

## 2024-09-12 ENCOUNTER — HOME CARE VISIT (OUTPATIENT)
Dept: HOME HEALTH SERVICES | Facility: HOME HEALTHCARE | Age: 69
End: 2024-09-12
Payer: COMMERCIAL

## 2024-09-12 ENCOUNTER — OFFICE VISIT (OUTPATIENT)
Dept: INFECTIOUS DISEASES | Facility: CLINIC | Age: 69
End: 2024-09-12
Payer: COMMERCIAL

## 2024-09-12 VITALS
DIASTOLIC BLOOD PRESSURE: 80 MMHG | WEIGHT: 244 LBS | BODY MASS INDEX: 33.05 KG/M2 | OXYGEN SATURATION: 97 % | HEIGHT: 72 IN | RESPIRATION RATE: 17 BRPM | TEMPERATURE: 97.3 F | SYSTOLIC BLOOD PRESSURE: 125 MMHG | HEART RATE: 75 BPM

## 2024-09-12 DIAGNOSIS — E11.9 TYPE 2 DIABETES MELLITUS (HCC): ICD-10-CM

## 2024-09-12 DIAGNOSIS — L08.9 INFECTED HEMATOMA: Primary | ICD-10-CM

## 2024-09-12 DIAGNOSIS — E66.9 CLASS 1 OBESITY IN ADULT: ICD-10-CM

## 2024-09-12 DIAGNOSIS — T14.8XXA INFECTED HEMATOMA: Primary | ICD-10-CM

## 2024-09-12 DIAGNOSIS — R78.81 BACTEREMIA: ICD-10-CM

## 2024-09-12 PROCEDURE — 99214 OFFICE O/P EST MOD 30 MIN: CPT | Performed by: PHYSICIAN ASSISTANT

## 2024-09-12 NOTE — PATIENT INSTRUCTIONS
Complete IV antibiotic course TODAY! (9/12/2024)  PICC line to be removed after last dose of antibiotic  Continue follow-up with vascular surgery  Call the ID office with any questions or concerns

## 2024-09-12 NOTE — TELEPHONE ENCOUNTER
Lvm for pt to offer 9/16 w/ erica dugan at [facility] at 8 am please message us to let us know if pt can do this date & time please

## 2024-09-12 NOTE — TELEPHONE ENCOUNTER
Caller: Juan Carlos Cleveland    Doctor: Tyrone    Reason for call: Ember (ID PA) called following up to reschedule a canceled appointment for her patient. The patient will be completing his antibiotics today so she wanted to be certain that he would be seen soon by vascular. Thank you.    Call back#: [phone]

## 2024-09-14 ENCOUNTER — HOME CARE VISIT (OUTPATIENT)
Dept: HOME HEALTH SERVICES | Facility: HOME HEALTHCARE | Age: 69
End: 2024-09-14
Payer: COMMERCIAL

## 2024-09-14 VITALS
TEMPERATURE: 97.9 F | HEART RATE: 63 BPM | OXYGEN SATURATION: 99 % | RESPIRATION RATE: 18 BRPM | SYSTOLIC BLOOD PRESSURE: 90 MMHG | DIASTOLIC BLOOD PRESSURE: 60 MMHG

## 2024-09-14 PROCEDURE — G0299 HHS/HOSPICE OF RN EA 15 MIN: HCPCS

## 2024-09-15 NOTE — PATIENT INSTRUCTIONS
-R groin incision is overall healed.  There are some surrounding redness without heat.  He has been applying nystatin powder and treating locally with Adaptic. There is some other growth which was treated with silver nitrate.   -Continue with local care to the right groin washing with soap and water; may apply bacitracin and clean dry dressing  -Continue with aspirin 81 and rosuvastatin 20  -Check LORRIE (already scheduled 12/16) and follow up office visit with surgeon (Saadia)  -OV in 2 weeks for wound check

## 2024-09-16 ENCOUNTER — HOME CARE VISIT (OUTPATIENT)
Dept: HOME HEALTH SERVICES | Facility: HOME HEALTHCARE | Age: 69
End: 2024-09-16
Payer: COMMERCIAL

## 2024-09-16 ENCOUNTER — OFFICE VISIT (OUTPATIENT)
Dept: VASCULAR SURGERY | Facility: CLINIC | Age: 69
End: 2024-09-16

## 2024-09-16 VITALS
OXYGEN SATURATION: 98 % | WEIGHT: 247.4 LBS | BODY MASS INDEX: 33.55 KG/M2 | HEART RATE: 72 BPM | SYSTOLIC BLOOD PRESSURE: 112 MMHG | DIASTOLIC BLOOD PRESSURE: 60 MMHG | TEMPERATURE: 97.8 F

## 2024-09-16 DIAGNOSIS — I97.630 POSTOPERATIVE HEMATOMA INVOLVING CIRCULATORY SYSTEM FOLLOWING CARDIAC CATHETERIZATION: Primary | ICD-10-CM

## 2024-09-16 PROCEDURE — 99024 POSTOP FOLLOW-UP VISIT: CPT | Performed by: PHYSICIAN ASSISTANT
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
## 2024-09-16 NOTE — PROGRESS NOTES
Ambulatory Visit  Name: Juan Carlos Cleveland Jr.      : 1955      MRN: [id number]  Encounter Provider: Angella Ibrahim PA-C  Encounter Date: 2024   Encounter department: [facility] [place]    Assessment & Plan      History of Present Illness   {Disappearing Hyperlinks I Encounters * My Last Note * Since Last Visit * History :84063}  Juan Carlos Cleveland Jr. is a 68 y.o. male who presents ***    {History obtained from (Optional):22463}  Review of Systems   Constitutional: Negative.    HENT: Negative.     Eyes: Negative.    Respiratory: Negative.     Cardiovascular: Negative.    Gastrointestinal: Negative.    Endocrine: Negative.    Genitourinary: Negative.    Musculoskeletal: Negative.    Skin: Negative.    Allergic/Immunologic: Negative.    Neurological: Negative.    Hematological: Negative.    Psychiatric/Behavioral: Negative.       {Select to Display PMH (Optional):74329}      Objective   {Disappearing Hyperlinks   Review Vitals * Enter New Vitals * Results Review * Labs * Imaging * Cardiology * Procedures * Lung Cancer Screening * Surgical eConsent :88510}  /60 (BP Location: Left arm, Patient Position: Sitting)   Pulse 72   Temp 97.8 °F (36.6 °C)   Wt 112 kg (247 lb 6.4 oz)   SpO2 98%   BMI 33.55 kg/m²     Physical Exam  {Administrative / Billing Section (Optional):70005}

## 2024-09-16 NOTE — ASSESSMENT & PLAN NOTE
S/P right groin washout and debridement of infected subq tissue and artery, arterial exploration with primary deep tissue closure with wound VAC placement with 2 black sponges (Doctor, 8/6/24)    Infected hematoma of right groin s/p groin washout (Saadia 8/2/24)    Staphylococcus aureus bacteremia with sepsis and right groin abscess      -Doing well. No issues.   -Patient unable to fully examine groin on own but has HH.  -Cleansing and placing a clean dressing; mild yellow drainage on dressing the next day.  -No groin pain, separation. No fevers chills.   -No HH groin issues per patient. Wound VAC discontinued by home health on 9/4.     -Exam: R groin incision is closed. There is surrounding flat erythema without heat or change per patient.  He has been applying nystatin powder.  No evidence of firmness or collection under the skin.       S/P POD #41. R groin incision is overall healed.  There are some surrounding redness without heat.  No induration. He has been applying nystatin powder and treating locally with Adaptic and covering with dressing. There is some over growth of the incision which was treated with silver nitrate. Continue local care.    -Stable R groin which is overall healed and closed after washout debridement and Wound VAC  -Continue with local care to the right groin washing with soap and water; may apply bacitracin and clean dry dressing  -Instructions for return to work given  -Continue with aspirin 81 and rosuvastatin 20  -S/P IV cefazolin treatment for 6 weeks with last dose and ID follow up on 9/12/24; PICC removed   -Check LORRIE (already scheduled 12/16) and follow up office visit with surgeon (Saadia)  -OV in 2 weeks for wound check

## 2024-09-23 ENCOUNTER — OFFICE VISIT (OUTPATIENT)
Dept: AUDIOLOGY | Age: 69
End: 2024-09-23
Payer: COMMERCIAL

## 2024-09-23 DIAGNOSIS — H90.3 SENSORY HEARING LOSS, BILATERAL: Primary | ICD-10-CM

## 2024-09-23 PROCEDURE — 92567 TYMPANOMETRY: CPT | Performed by: AUDIOLOGIST-HEARING AID FITTER

## 2024-09-23 PROCEDURE — 92557 COMPREHENSIVE HEARING TEST: CPT | Performed by: AUDIOLOGIST-HEARING AID FITTER

## 2024-09-23 NOTE — PROGRESS NOTES
"Diagnostic Hearing Evaluation    Name:  Juan Carlos Cleveland Jr.  :  1955  Age:  69 y.o.   MRN:  [id number]  Date of Evaluation: 24     HISTORY:     Reason for visit: Tinnitus, Dizziness    Juan Carlos Cleveland Jr. is being seen today at the request of Dr. Nolasco for an initial  evaluation of hearing. The patient reports he experiences a right sided tinnitus described as \"crickets\". He reported if he pushes on his jaw, he can make the sound temporarily dissipate. He has been diagnosed with TMJ by his dentist. Some issues hearing the TV, some trouble if there is significant background noise. Denied ear pain and aural fullness. There is some dizziness at times described as an imbalance when he stands up. There is a history of loud noise exposure.     EVALUATION:    Otoscopic Evaluation:   Right Ear: Unremarkable, canal clear   Left Ear: Unremarkable, canal clear    Tympanometry:   Right Ear: Type A; normal middle ear pressure and static compliance    Left Ear: Type Ad; normal middle ear pressure with increased static compliance, consistent with a hypermobile tympanic membrane     Speech Audiometry:  Speech Reception (SRT)    Right Ear: 25 dB HL    Left Ear: 25 dB HL    Word Recognition Scores (WRS):  Right Ear: excellent (100 % correct)     Left Ear: excellent (100 % correct)    Stimuli: [id number]    Pure Tone Audiometry:  Conventional pure tone audiometry from 250 - 8000 Hz  was obtained with good reliability and revealed the following:     Right Ear: Mild sensorineural hearing loss (SNHL)    Left Ear: Mild sensorineural hearing loss (SNHL)     *see attached audiogram    RECOMMENDATIONS:  Annual hearing eval, Consult ENT, Return to [facility]. for F/U, and Copy to Patient/Caregiver    PATIENT EDUCATION:   The results of today's results and recommendations were reviewed with the patient and his hearing thresholds were explained at length. Discussed that his tinnitus is likely related to TMJ issues. An ENT consult may be " recommended due to unilateral tinnitus as well as dizziness. A VNG may be recommended to rule out an inner ear issue in regards to his balance. Questions were addressed and the patient was encouraged to contact our department should concerns arise.      Lisa Ho, CCC-A   Clinical Audiologist  [facility]  [address]  [place] [id number]

## 2024-10-10 ENCOUNTER — OFFICE VISIT (OUTPATIENT)
Dept: VASCULAR SURGERY | Facility: CLINIC | Age: 69
End: 2024-10-10

## 2024-10-10 VITALS
RESPIRATION RATE: 18 BRPM | HEIGHT: 72 IN | DIASTOLIC BLOOD PRESSURE: 84 MMHG | OXYGEN SATURATION: 98 % | SYSTOLIC BLOOD PRESSURE: 124 MMHG | BODY MASS INDEX: 33.46 KG/M2 | HEART RATE: 66 BPM | WEIGHT: 247 LBS

## 2024-10-10 DIAGNOSIS — I97.630 POSTOPERATIVE HEMATOMA INVOLVING CIRCULATORY SYSTEM FOLLOWING CARDIAC CATHETERIZATION: Primary | ICD-10-CM

## 2024-10-10 PROCEDURE — 99024 POSTOP FOLLOW-UP VISIT: CPT | Performed by: NURSE PRACTITIONER
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
## 2024-10-22 NOTE — PROGRESS NOTES
Assessment/Plan:      There are no diagnoses linked to this encounter.      Subjective:     Patient ID: Juan Carlos Cleveland Jr. is a 69 y.o. male.      HPI    Review of Systems      Objective:     Physical Exam

## 2024-10-24 ENCOUNTER — OFFICE VISIT (OUTPATIENT)
Dept: VASCULAR SURGERY | Facility: CLINIC | Age: 69
End: 2024-10-24

## 2024-10-24 VITALS
WEIGHT: 246 LBS | BODY MASS INDEX: 33.32 KG/M2 | HEART RATE: 76 BPM | SYSTOLIC BLOOD PRESSURE: 100 MMHG | HEIGHT: 72 IN | DIASTOLIC BLOOD PRESSURE: 64 MMHG

## 2024-10-24 DIAGNOSIS — Z86.718 HISTORY OF DVT (DEEP VEIN THROMBOSIS): ICD-10-CM

## 2024-10-24 DIAGNOSIS — I97.630 POSTOPERATIVE HEMATOMA INVOLVING CIRCULATORY SYSTEM FOLLOWING CARDIAC CATHETERIZATION: Primary | ICD-10-CM

## 2024-10-24 PROCEDURE — 99024 POSTOP FOLLOW-UP VISIT: CPT | Performed by: NURSE PRACTITIONER
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
## 2024-10-24 NOTE — PATIENT INSTRUCTIONS
Right groin is completely healed.  Scheduled for lower extremity arterial duplex in December followed by an office visit with Dr. Zee.

## 2024-10-30 ENCOUNTER — CONSULT (OUTPATIENT)
Dept: ENDOCRINOLOGY | Facility: CLINIC | Age: 69
End: 2024-10-30
Payer: COMMERCIAL

## 2024-10-30 VITALS
HEIGHT: 72 IN | SYSTOLIC BLOOD PRESSURE: 112 MMHG | BODY MASS INDEX: 33.59 KG/M2 | TEMPERATURE: 97.3 F | DIASTOLIC BLOOD PRESSURE: 56 MMHG | HEART RATE: 59 BPM | WEIGHT: 248 LBS | OXYGEN SATURATION: 98 %

## 2024-10-30 DIAGNOSIS — R63.5 WEIGHT GAIN: ICD-10-CM

## 2024-10-30 DIAGNOSIS — E78.2 MIXED HYPERLIPIDEMIA: ICD-10-CM

## 2024-10-30 DIAGNOSIS — E66.811 CLASS 1 OBESITY WITHOUT SERIOUS COMORBIDITY WITH BODY MASS INDEX (BMI) OF 34.0 TO 34.9 IN ADULT, UNSPECIFIED OBESITY TYPE: ICD-10-CM

## 2024-10-30 DIAGNOSIS — E11.9 TYPE 2 DIABETES MELLITUS WITHOUT COMPLICATION, WITHOUT LONG-TERM CURRENT USE OF INSULIN (HCC): Primary | ICD-10-CM

## 2024-10-30 DIAGNOSIS — E11.42 DIABETIC POLYNEUROPATHY ASSOCIATED WITH TYPE 2 DIABETES MELLITUS (HCC): ICD-10-CM

## 2024-10-30 PROCEDURE — 99214 OFFICE O/P EST MOD 30 MIN: CPT | Performed by: INTERNAL MEDICINE

## 2024-10-30 PROCEDURE — 95250 CONT GLUC MNTR PHYS/QHP EQP: CPT | Performed by: INTERNAL MEDICINE

## 2024-10-30 NOTE — PROGRESS NOTES
Continous glucose monitoring martin placement     Date/Time  10/30/2024 8:20 AM     Performed by  Clover Mccord MA   Authorized by  Larisa Nuñez MD     [place] Protocol   procedure performed by consultantConsent: Verbal consent obtained. Written consent obtained.  Risks and benefits: risks, benefits and alternatives were discussed  Consent given by: patient  Timeout called at: 10/30/2024 10:52 AM.  Patient understanding: patient states understanding of the procedure being performed  Patient consent: the patient's understanding of the procedure matches consent given  Procedure consent: procedure consent matches procedure scheduled  Patient identity confirmed: verbally with patient      Local anesthesia used: no     Anesthesia   Local anesthesia used: no     Sedation   Patient sedated: no       Procedure Details   Procedure Notes: Martin pro placed on patients left arm. Patient tolerated well.    SN: [id number]  LOT: [id number]  EXP: 04/30/2025  Patient tolerance: patient tolerated the procedure well with no immediate complications

## 2024-10-30 NOTE — PATIENT INSTRUCTIONS
Patient Education     Semaglutide (eliot a GLOO tide)   Brand Names: US Ozempic (0.25 or 0.5 MG/DOSE); Ozempic (1 MG/DOSE); Ozempic (2 MG/DOSE); Rybelsus; Wegovy   Brand Names: Stephane Ozempic (0.25 or 0.5 MG/DOSE); Ozempic (1 MG/DOSE); Rybelsus; Wegovy   Warning   This drug has been shown to cause thyroid cancer in some animals. It is not known if this happens in humans. If thyroid cancer happens, it may be deadly if not found and treated early. Call your doctor right away if you have a neck mass, trouble breathing, trouble swallowing, or have hoarseness that will not go away.  Do not use this drug if you have a health problem called Multiple Endocrine Neoplasia syndrome type 2 (MEN 2), or if you or a family member have had thyroid cancer.  What is this drug used for?   Ozempic prefilled pens and Rybelsus tablets:   It is used to lower blood sugar in people with type 2 diabetes.  Ozempic prefilled pens:   It is used to lower the chance of heart attack, stroke, and death in people with type 2 diabetes and heart disease.  Wegovy prefilled pens:   It is used to help with weight loss in certain people.  It is used to lower the chance of heart attack, stroke, and death in people who are overweight and have heart disease.  What do I need to tell my doctor BEFORE I take this drug?   For all uses of this drug:   If you are allergic to this drug; any part of this drug; or any other drugs, foods, or substances. Tell your doctor about the allergy and what signs you had.  If you have ever had pancreatitis.  If you have or have ever had depression or thoughts of suicide.  If you are using another drug that has the same drug in it.  If you are using another drug like this one. If you are not sure, ask your doctor or pharmacist.  If using for high blood sugar:   If you have type 1 diabetes. Do not use this drug to treat type 1 diabetes.  Tablets:   If you are breast-feeding. Do not breast-feed while you take this drug.  This is not  a list of all drugs or health problems that interact with this drug.  Tell your doctor and pharmacist about all of your drugs (prescription or OTC, natural products, vitamins) and health problems. You must check to make sure that it is safe for you to take this drug with all of your drugs and health problems. Do not start, stop, or change the dose of any drug without checking with your doctor.  What are some things I need to know or do while I take this drug?   For all uses of this drug:   Tell all of your health care providers that you take this drug. This includes your doctors, nurses, pharmacists, and dentists.  Follow the diet and workout plan that your doctor told you about.  Have blood work checked as you have been told by the doctor. Talk with the doctor.  Talk with your doctor before you drink alcohol.  Kidney problems have happened. Sometimes, these may need to be treated in the hospital or with dialysis.  If you cannot drink liquids by mouth or if you have upset stomach, throwing up, or diarrhea that does not go away, you need to avoid getting dehydrated. Contact your doctor to find out what to do. Dehydration may lead to low blood pressure or to new or worsening kidney problems.  Do not share pen or cartridge devices with another person even if the needle has been changed. Sharing these devices may pass infections from one person to another. This includes infections you may not know you have.  If you are planning on getting pregnant, talk with your doctor. You may need to stop taking this drug at least 2 months before getting pregnant.  If using for high blood sugar:   Wear disease medical alert ID (identification).  Check your blood sugar as you have been told by your doctor.  Do not drive if your blood sugar has been low. There is a greater chance of you having a crash.  People taking this drug with other drugs for diabetes may have a raised risk of low blood sugar. Very low blood sugar can lead to  seizures, passing out, long lasting brain damage, and sometimes death. Talk with the doctor.  It may be harder to control blood sugar during times of stress such as fever, infection, injury, or surgery. A change in physical activity, exercise, or diet may also affect blood sugar.  Tell your doctor if you are pregnant or may be pregnant.  Ozempic prefilled pens:   Tell your doctor if you are breast-feeding. You will need to talk about any risks to your baby.  Wegovy prefilled pens:   If you have high blood sugar (diabetes), you will need to watch your blood sugar closely.  If you are 75 or older, use this drug with care. You may have a raised chance of hip or pelvis fracture.  Weight loss during pregnancy may cause harm to the unborn baby. If you get pregnant while taking this drug or if you want to get pregnant, call your doctor right away.  Tell your doctor if you are breast-feeding. You will need to talk about any risks to your baby.  What are some side effects that I need to call my doctor about right away?   WARNING/CAUTION: Even though it may be rare, some people may have very bad and sometimes deadly side effects when taking a drug. Tell your doctor or get medical help right away if you have any of the following signs or symptoms that may be related to a very bad side effect:  For all uses of this drug:   Signs of an allergic reaction, like rash; hives; itching; red, swollen, blistered, or peeling skin with or without fever; wheezing; tightness in the chest or throat; trouble breathing, swallowing, or talking; unusual hoarseness; or swelling of the mouth, face, lips, tongue, or throat.  Signs of kidney problems like unable to pass urine, change in how much urine is passed, blood in the urine, or a big weight gain.  Signs of gallbladder problems like pain in the upper right belly area, right shoulder area, or between the shoulder blades; change in stools; dark urine or yellow skin or eyes; or fever with  chills.  Severe dizziness or passing out.  A fast heartbeat.  Change in eyesight.  Low blood sugar can happen. The chance may be raised when this drug is used with other drugs for diabetes. Signs may be dizziness, headache, feeling sleepy or weak, shaking, fast heartbeat, confusion, hunger, or sweating. Call your doctor right away if you have any of these signs. Follow what you have been told to do for low blood sugar. This may include taking glucose tablets, liquid glucose, or some fruit juices.  Severe and sometimes deadly pancreas problems (pancreatitis) have happened with this drug. Call your doctor right away if you have severe stomach pain, severe back pain, or severe upset stomach or throwing up.  Wegovy prefilled pens:   New or worse behavior or mood changes like depression or thoughts of suicide.  Pain in the hip or pelvis.  What are some other side effects of this drug?   All drugs may cause side effects. However, many people have no side effects or only have minor side effects. Call your doctor or get medical help if any of these side effects or any other side effects bother you or do not go away:  If using for high blood sugar:   Constipation, diarrhea, stomach pain, upset stomach, or throwing up.  Tablets:   Decreased appetite.  Wegovy prefilled pens:   Constipation, diarrhea, stomach pain, upset stomach, or throwing up.  Headache.  Feeling dizzy, tired, or weak.  Nose or throat irritation.  Runny nose.  Bloating.  Burping.  Gas.  Heartburn.  These are not all of the side effects that may occur. If you have questions about side effects, call your doctor. Call your doctor for medical advice about side effects.  You may report side effects to your national health agency.  You may report side effects to the FDA at 1-362.570.1546. You may also report side effects at https://www.fda.gov/medwatch.  How is this drug best taken?   Use this drug as ordered by your doctor. Read all information given to you.  Follow all instructions closely.  Tablets:   Take at least 30 minutes before the first food, drink, or drugs of the day.  Take with plain water only. Do not take with more than 4 ounces (120 mL) of water.  Swallow whole. Do not chew, break, or crush.  Keep taking this drug as you have been told by your doctor or other health care provider, even if you feel well.  Prefilled syringes or pens:   It is given as a shot into the fatty part of the skin on the top of the thigh, belly area, or upper arm.  If you will be giving yourself the shot, your doctor or nurse will teach you how to give the shot.  Take with or without food.  Take the same day each week.  Move the site where you give the shot with each shot.  Do not use if the solution is cloudy, leaking, or has particles.  This drug is clear and colorless. Do not use if the solution changes color.  Wash your hands before and after use.  Remove all pen needle covers before injecting a dose (there may be 2). If you are not sure what type of pen needle you have or how to use it, talk with the doctor.  Keep taking this drug as you have been told by your doctor or other health care provider, even if you feel well.  Throw away needles in a needle/sharp disposal box. Do not reuse needles or other items. When the box is full, follow all local rules for getting rid of it. Talk with a doctor or pharmacist if you have any questions.  If using for high blood sugar:   Attach new needle before each dose.  Take off the needle after each shot. Do not store this device with the needle on it.  Put the cap back on after you are done using your dose.  If you are also using insulin, you may inject this drug and the insulin in the same area of the body but not right next to each other.  Do not mix this drug in the same syringe with insulin.  This product may make a clicking sound as you prepare the dose. Do not prepare the dose by counting the clicks. Doing so could lead to using the wrong  dose.  Wegovy prefilled pens:   Each container is for one use only. Use right after opening. Throw away any part of the opened container after the dose is given.  Do not use this drug if it has been dropped or if it is broken.  Do not get this product wet.  What do I do if I miss a dose?   Tablets:   Skip the missed dose and go back to your normal time.  Do not take 2 doses at the same time or extra doses.  Prefilled syringes or pens:   Take a missed dose as soon as you think about it and go back to your normal time.  If it is less than 48 hours until your next dose, skip the missed and go back to your normal time.  Do not take 2 doses within 48 hours of each other.  If you miss 2 doses, call your doctor.  How do I store and/or throw out this drug?   Tablets:   Store in the original container at room temperature.  Store in a dry place. Do not store in a bathroom.  Prefilled syringes or pens:   Store unopened pens in a refrigerator. Do not freeze.  Do not use if it has been frozen.  Ozempic prefilled pens:   After opening, store in the refrigerator or at room temperature. Throw away any part not used after 56 days.  Protect from heat and light.  Wegovy prefilled pens:   You may store unopened containers at room temperature. If you store at room temperature, throw away any part not used after 28 days.  Store in the outer carton to protect from light.  Protect from heat.  All products:   Keep all drugs in a safe place. Keep all drugs out of the reach of children and pets.  Throw away unused or  drugs. Do not flush down a toilet or pour down a drain unless you are told to do so. Check with your pharmacist if you have questions about the best way to throw out drugs. There may be drug take-back programs in your area.  General drug facts   If your symptoms or health problems do not get better or if they become worse, call your doctor.  Do not share your drugs with others and do not take anyone else's drugs.  Some  drugs may have another patient information leaflet. If you have any questions about this drug, please talk with your doctor, nurse, pharmacist, or other health care provider.  This drug comes with an extra patient fact sheet called a Medication Guide. Read it with care. Read it again each time this drug is refilled. If you have any questions about this drug, please talk with the doctor, pharmacist, or other health care provider.  If you think there has been an overdose, call your poison control center or get medical care right away. Be ready to tell or show what was taken, how much, and when it happened.  Consumer Information Use and Disclaimer   This generalized information is a limited summary of diagnosis, treatment, and/or medication information. It is not meant to be comprehensive and should be used as a tool to help the user understand and/or assess potential diagnostic and treatment options. It does NOT include all information about conditions, treatments, medications, side effects, or risks that may apply to a specific patient. It is not intended to be medical advice or a substitute for the medical advice, diagnosis, or treatment of a health care provider based on the health care provider's examination and assessment of a patient's specific and unique circumstances. Patients must speak with a health care provider for complete information about their health, medical questions, and treatment options, including any risks or benefits regarding use of medications. This information does not endorse any treatments or medications as safe, effective, or approved for treating a specific patient. [employer] and its affiliates disclaim any warranty or liability relating to this information or the use thereof. The use of this information is governed by the Terms of Use, available at https://www.wolterskluwer.com/en/know/clinical-effectiveness-terms.  Last Reviewed Date   2024-04-19  Copyright   © 2024 [employer]  and its affiliates and/or licensors. All rights reserved.

## 2024-10-30 NOTE — PROGRESS NOTES
"    New Consult Note      CC: Type 2 diabetes    History of Present Illness:   69 yr male with Type 2 diabetes since 2010, HTN, HLD, obesity BMI 33, recent staph aureus bacteremia 2\" Rt Groin abscess after a cardiac catheterization, spinal stenosis, CAD, Hx VTE Fem-Pop,     SAGM:    Current meds:  Pioglitazone -metformin  PO BID    Opthamology:   Podiatry:   vaccination:   Dental:  Pancreatitis:     Ace/ARB: lisinopril  Statin:zetia, crestor  Thyroid issues:      Physical Exam:  Body mass index is 33.63 kg/m².  /56 (BP Location: Left arm, Patient Position: Sitting, Cuff Size: Standard)   Pulse 59   Temp (!) 97.3 °F (36.3 °C) (Temporal)   Ht 6' (1.829 m)   Wt 112 kg (248 lb)   SpO2 98%   BMI 33.63 kg/m²    Vitals:    10/30/24 0840   Weight: 112 kg (248 lb)        Physical Exam  Constitutional:       General: He is not in acute distress.     Appearance: He is well-developed.   HENT:      Head: Normocephalic and atraumatic.      Nose: Nose normal.   Eyes:      Conjunctiva/sclera: Conjunctivae normal.   Pulmonary:      Effort: Pulmonary effort is normal.   Abdominal:      General: There is no distension.   Musculoskeletal:      Cervical back: Normal range of motion and neck supple.   Skin:     Findings: No rash.      Comments: No icterus   Neurological:      Mental Status: He is alert and oriented to person, place, and time.         Labs:   Lab Results   Component Value Date    HGBA1C 8.0 (H) 07/26/2024       Lab Results   Component Value Date    TSH 1.17 12/31/2019       Lab Results   Component Value Date    CREATININE 0.70 09/09/2024    CREATININE 0.75 09/02/2024    CREATININE 0.76 08/26/2024    BUN 21 08/07/2024    K 4.1 08/07/2024     08/07/2024    CO2 23 08/07/2024     GFR, Calculated   Date Value Ref Range Status   12/31/2019 63 >60 mL/min/1.73m2 Final     Comment:     mL/min per 1.73 square meters                                            Normal Function or Mild Renal    Disease (if " "clinically at risk):  >or=60  Moderately Decreased:                30-59  Severely Decreased:                  15-29  Renal Failure:                         <15                                            -American GFR: multiply reported GFR by 1.16    Please note that the eGFR is based on the CKD-EPI calculation, and is not intended to be used for drug dosing.                                            Note: Calculated GFR may not be an accurate indicator of renal function if the patient's renal function is not in a steady state.     eGFRcr   Date Value Ref Range Status   05/08/2024 89 >59 Final     eGFR   Date Value Ref Range Status   09/09/2024 96 ml/min/1.73sq m Final       Lab Results   Component Value Date    ALT 22 08/02/2024    AST 23 08/02/2024    ALKPHOS 100 08/02/2024       Lab Results   Component Value Date    CHOLESTEROL 115 07/26/2024     Lab Results   Component Value Date    HDL 50 07/26/2024     Lab Results   Component Value Date    TRIG 79 07/26/2024     No results found for: \"NONHDLC\"      Assessment/Plan:    1. Type 2 diabetes mellitus without complication, without long-term current use of insulin ([facility])  Assessment & Plan:  He seems uncontrolled but had recent sepsis with bacteremia.    Today we discussed all aspects of diabetes including pathophysiology, risk factors, complications, SAGM, CGM, diet, lifestyle modifications, medical fitness training, diabetes education, goals of therapy, follow up needs and medications including insulin, metformin, Jardiance and GLP1 agonists.  Advised to maintain goal blood sugars 70-180mg/dL.    We agreed to continue pioglitazone/metformin for now. Will check A1c and CGM pro.  Follow up in 6-8 weeks to adjust therapy.    Lab Results   Component Value Date    HGBA1C 8.0 (H) 07/26/2024     Orders:  -     Ambulatory Referral to Endocrinology  -     Continous glucose monitoring hilton placement; Future  -     Continous glucose monitoring hilton intrepretation; " Future  -     Hemoglobin A1C; Future  2. Class 1 obesity without serious comorbidity with body mass index (BMI) of 34.0 to 34.9 in adult, unspecified obesity type  Assessment & Plan:  Today we discussed all aspects of obesity and metabolism including pathophysiology, risk factors, complications, goal of sustaining weight loss long term, usual propensity to regain weight with short term approaches, follow up needs and medications - efficacy and limitations.   Discussed role of endocrinopathies, inflammatory conditions, sleep disorders, mental health disorders, lifestyle issues and polypharmacy and weight gain.  Briefly discussed bariatric surgery.  Diet: carb controlled diet <1500cal/day/ VLCD, 64oz fluids/day   lifestyle modifications: intermittent fasting and 10,000 steps/day  medical fitness training: muscle building  education: nutrition input    3. Diabetic polyneuropathy associated with type 2 diabetes mellitus (HCC)  4. Weight gain  -     T4, free; Future  -     TSH, 3rd generation; Future  5. Mixed hyperlipidemia  Assessment & Plan:  He is on crestor and zetia.  Orders:  -     Lipid panel; Future        I have spent a total time of 40 minutes on 10/30/24 in caring for this patient including greater than 50% of this time was spent in counseling/coordination of care as listed above.       Discussed with the patient and all questioned fully answered. He will contact me with concerns.    Larisa Nuñez

## 2024-10-30 NOTE — ASSESSMENT & PLAN NOTE
He seems uncontrolled but had recent sepsis with bacteremia.    Today we discussed all aspects of diabetes including pathophysiology, risk factors, complications, SAGM, CGM, diet, lifestyle modifications, medical fitness training, diabetes education, goals of therapy, follow up needs and medications including insulin, metformin, Jardiance and GLP1 agonists.  Advised to maintain goal blood sugars 70-180mg/dL.    We agreed to continue pioglitazone/metformin for now. Will check A1c and CGM pro.  Follow up in 6-8 weeks to adjust therapy.    Lab Results   Component Value Date    HGBA1C 8.0 (H) 07/26/2024

## 2024-11-08 ENCOUNTER — TELEPHONE (OUTPATIENT)
Dept: VASCULAR SURGERY | Facility: CLINIC | Age: 69
End: 2024-11-08

## 2024-11-08 NOTE — TELEPHONE ENCOUNTER
Fax'd script to [facility] to discontinue wound care supplies- FAX#[phone].

## 2024-11-11 ENCOUNTER — TELEPHONE (OUTPATIENT)
Dept: DIABETES SERVICES | Facility: CLINIC | Age: 69
End: 2024-11-11

## 2024-11-11 NOTE — TELEPHONE ENCOUNTER
L/M for patient to R/S 11/14 appt. No my chart available to send message there. If patient calls back, please schedule:    New patient  Class Assessment  75 minutes

## 2024-11-13 ENCOUNTER — CLINICAL SUPPORT (OUTPATIENT)
Dept: ENDOCRINOLOGY | Facility: CLINIC | Age: 69
End: 2024-11-13
Payer: COMMERCIAL

## 2024-11-13 VITALS
OXYGEN SATURATION: 98 % | HEART RATE: 59 BPM | BODY MASS INDEX: 33.59 KG/M2 | TEMPERATURE: 97.3 F | WEIGHT: 248 LBS | SYSTOLIC BLOOD PRESSURE: 112 MMHG | DIASTOLIC BLOOD PRESSURE: 56 MMHG | HEIGHT: 72 IN

## 2024-11-13 DIAGNOSIS — E11.9 TYPE 2 DIABETES MELLITUS WITHOUT COMPLICATION, WITHOUT LONG-TERM CURRENT USE OF INSULIN (HCC): ICD-10-CM

## 2024-11-13 PROCEDURE — 95251 CONT GLUC MNTR ANALYSIS I&R: CPT | Performed by: INTERNAL MEDICINE

## 2024-11-13 NOTE — PROGRESS NOTES
Continous glucose monitoring martin intrepretation     Date/Time  11/13/2024 3:45 PM     Performed by  Debra Rose MA   Authorized by  Larisa Nuñez MD     [place] Protocol   procedure performed by consultantConsent: Verbal consent obtained. Written consent obtained.  Consent given by: patient  Timeout called at: 11/13/2024 4:01 PM.  Patient consent: the patient's understanding of the procedure matches consent given  Site marked: the operative site was marked  Patient identity confirmed: verbally with patient      Local anesthesia used: no     Anesthesia   Local anesthesia used: no     Sedation   Patient sedated: no        Specimen: no    Culture: no   Procedure Details   Procedure Notes: Martin Pro Sensor removed from left arm, no complications.  Patient tolerance: patient tolerated the procedure well with no immediate complications

## 2024-12-06 ENCOUNTER — OFFICE VISIT (OUTPATIENT)
Dept: DIABETES SERVICES | Facility: CLINIC | Age: 69
End: 2024-12-06

## 2024-12-06 VITALS — BODY MASS INDEX: 33.23 KG/M2 | WEIGHT: 245 LBS

## 2024-12-06 DIAGNOSIS — E11.9 TYPE 2 DIABETES MELLITUS WITHOUT COMPLICATION, WITHOUT LONG-TERM CURRENT USE OF INSULIN (HCC): Primary | ICD-10-CM

## 2024-12-06 NOTE — PATIENT INSTRUCTIONS
Consume 3 balanced meals/day at appropriate times.  30 grams carbohydrate/meal and 15 grams carbohydrate/snack.  Consume 3 balanced snacks/day at appropriate times.

## 2024-12-06 NOTE — PROGRESS NOTES
Medical Nutrition Therapy        Assessment    Visit Type: Initial visit  Chief complaint/Medical Diagnosis/reason for visit Type 2 Diabetes Mellitus without complication without long term current use of insulin    HPI Juan Carlos came in with his wife, Suzi, for his initial Medical Nutrition therapy education.  Juan Carlos stated his goal is to lower his blood sugars and his A1C.  Juan Carlos's most current A1C = 8.0 on 7/26/24.  Obtained a 24 hour food recall.  Problems identified in food recall include inconsistent carbohydrate intake, going too long without eating, some unbalanced meals, sipping on regular Gatorade for about 6 hours at work (5 days/week), and drinking regular chocolate milk 16 oz/day. Provided patient with a 2000 calorie balanced individualized meal plan to assist with consistency, balance and portion control.  Encouraged the consumption of balanced meals at appropriate times.  Advised patient to keep carbohydrate intake to 30 grams per meal and 15 gram per snack to assist with glycemic control.  Suggested keeping protein intake balanced with carbohydrate intake and having heart healthy fat to assist with lipid management and calorie control. My Plate, Portion booklet and food labels were used to teach basic carbohydrate counting. Patient stated he will call at a later date if he desires additional Medical Nutrition therapy education. RD will remain available for further dietary questions/concerns.     Ht Readings from Last 1 Encounters:   11/13/24 6' (1.829 m)     Wt Readings from Last 3 Encounters:   12/06/24 111 kg (245 lb)   11/13/24 112 kg (248 lb)   10/30/24 112 kg (248 lb)     Weight Change: Yes lost 3 lbs since last chart encounter.    Barriers to Learning: no barriers    Do you follow any special diet presently?: Yes - tries to eat healthy  Who shops: patient and spouse  Who cooks: patient and spouse    Food Log: Completed via the method of food recall    Breakfast:4:15AM - 1 pancake or waffle  or 2 sl toast with butter, tea with Splenda and cream  Morning Snack:8:30AM - Meal - whole sandwich 3 sl turkey and 2 sl cheese; sips on regular Gatorade during 6 hrs at work, or drinks diet green tea  Lunch:Noon - granola bar or cheese + crackers or peanut butter + crackers  Afternoon Snack: 3:30PM - diet green tea or regular soda, or 16 oz chocolate milk  Dinner:5-6pm - chicken breast, corn, potatoes, salad; spaghetti + meatballs; hot dogs + fries + coleslaw; roast or pork chops with potatoes + salad, 12 oz cran-apple juice  Evening Snack:8PM - chocolate milk + tasty cake or 4 cookies or homemade muffin  Beverages: water, tea, diet green tea, chocolate milk, regular soda  Eating out/Take out:1x/month  Exercise 8 hrs physicial labor at work 5 days/week - 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    Calorie needs 2000kcals/day Carbs: 30g/meal, 15g/snack     Fat: heart healthy    Protein:balance with carbohydrates    Nutrition Diagnosis:  Food and nutrition related knowledge deficit  related to Lack of prior exposure to accurate nutrition related information as evidenced by Verbalizes inaccurate or incomplete information    Intervention: plate method, label reading, carbohydrate counting, increased protein intake, meal timing, weight/ BMI goals, meal planning, individualized meal plan, and monitoring portion control     Treatment Goals: Patient will consume 3 meals a day, Patient will consume snacks, and Patient will count carbohydrates    Monitoring and evaluation:    Term code indicator  FH 1.3.2 Food Intake Criteria: Consume 3 balanced meals/day at appropriate times.  Term code indicator  FH 1.6.3 Carbohydrate Intake Criteria: 30 grams carbohydrate/meal  (3 meals/day) and 15 grams carbohydrate/snack  Term code indicator  FH 1.3.2 Food Intake Criteria: Consume 3 balanced snacks/day at appropriate times.    Materials Provided: Individualized meal plan, CHO counting, Portion Booklet    Patient’s  Response to Instruction:  Comprehensiongood  Motivationgood  Expected Compliancegood    Begin Time: 2:50PM  End Time: 4:15PM  Referring Provider: Larisa Nuñez MD    Thank you for coming to the [facility] for education today.  Please feel free to call with any questions or concerns.    Sangeeta Lal, RD  [address] [address]  [phone]

## 2024-12-09 ENCOUNTER — TELEPHONE (OUTPATIENT)
Dept: ENDOCRINOLOGY | Facility: CLINIC | Age: 69
End: 2024-12-09

## 2024-12-16 ENCOUNTER — HOSPITAL ENCOUNTER (OUTPATIENT)
Dept: VASCULAR ULTRASOUND | Facility: HOSPITAL | Age: 69
Discharge: HOME | End: 2024-12-16
Payer: COMMERCIAL

## 2024-12-16 DIAGNOSIS — I97.630 POSTOPERATIVE HEMATOMA INVOLVING CIRCULATORY SYSTEM FOLLOWING CARDIAC CATHETERIZATION: ICD-10-CM

## 2024-12-16 PROCEDURE — 93925 LOWER EXTREMITY STUDY: CPT | Performed by: SURGERY

## 2024-12-16 PROCEDURE — 93922 UPR/L XTREMITY ART 2 LEVELS: CPT | Performed by: SURGERY

## 2024-12-16 PROCEDURE — 93925 LOWER EXTREMITY STUDY: CPT

## 2024-12-16 PROCEDURE — 93923 UPR/LXTR ART STDY 3+ LVLS: CPT

## 2024-12-20 ENCOUNTER — RESULTS FOLLOW-UP (OUTPATIENT)
Dept: VASCULAR SURGERY | Facility: CLINIC | Age: 69
End: 2024-12-20

## 2025-01-30 ENCOUNTER — OFFICE VISIT (OUTPATIENT)
Dept: ENDOCRINOLOGY | Facility: CLINIC | Age: 70
End: 2025-01-30
Payer: COMMERCIAL

## 2025-01-30 VITALS
OXYGEN SATURATION: 98 % | TEMPERATURE: 97.8 F | SYSTOLIC BLOOD PRESSURE: 116 MMHG | WEIGHT: 251 LBS | HEART RATE: 61 BPM | DIASTOLIC BLOOD PRESSURE: 64 MMHG | HEIGHT: 72 IN | BODY MASS INDEX: 34 KG/M2

## 2025-01-30 DIAGNOSIS — E66.01 MORBID (SEVERE) OBESITY DUE TO EXCESS CALORIES (HCC): ICD-10-CM

## 2025-01-30 DIAGNOSIS — E11.42 DIABETIC POLYNEUROPATHY ASSOCIATED WITH TYPE 2 DIABETES MELLITUS (HCC): ICD-10-CM

## 2025-01-30 DIAGNOSIS — E11.65 TYPE 2 DIABETES MELLITUS WITH HYPERGLYCEMIA, WITHOUT LONG-TERM CURRENT USE OF INSULIN (HCC): Primary | ICD-10-CM

## 2025-01-30 DIAGNOSIS — E78.2 MIXED HYPERLIPIDEMIA: ICD-10-CM

## 2025-01-30 DIAGNOSIS — E66.811 CLASS 1 OBESITY WITH SERIOUS COMORBIDITY IN ADULT, UNSPECIFIED BMI, UNSPECIFIED OBESITY TYPE: ICD-10-CM

## 2025-01-30 PROCEDURE — 99214 OFFICE O/P EST MOD 30 MIN: CPT | Performed by: INTERNAL MEDICINE

## 2025-01-30 PROCEDURE — 95251 CONT GLUC MNTR ANALYSIS I&R: CPT | Performed by: INTERNAL MEDICINE

## 2025-01-30 NOTE — PROGRESS NOTES
"    Follow-up Patient Progress Note      CC: Type 2 diabetes     History of Present Illness:   69 yr male with Type 2 diabetes since 2010, HTN, HLD, obesity BMI 33, recent staph aureus bacteremia 2\" Rt Groin abscess after a cardiac catheterization, spinal stenosis, CAD, Hx VTE Fem-Pop,        CGM data review::  Device: hilton Dates: 10/30/25-11/13/25 Usage: 100 %  Av glu: 158 mg/dL  SD:  mg/dL CV: 24.9  % GMI: 7.1 %  TIR: 75 %  TAR: 25+0 %  TBR:   %    Glycemic patters:  euglycemia.  Hypoglycemia: No     Current meds:  Pioglitazone -metformin  PO BID     Opthamology:   Podiatry:   vaccination:   Dental:  Pancreatitis:      Ace/ARB: lisinopril  Statin:zetia, crestor  Thyroid issues:     Physical Exam:  Body mass index is 34.04 kg/m².  /64 (BP Location: Left arm, Patient Position: Sitting, Cuff Size: Standard)   Pulse 61   Temp 97.8 °F (36.6 °C) (Tympanic)   Ht 6' (1.829 m)   Wt 114 kg (251 lb)   SpO2 98%   BMI 34.04 kg/m²    Vitals:    01/30/25 1508   Weight: 114 kg (251 lb)        Physical Exam  Constitutional:       General: He is not in acute distress.     Appearance: He is well-developed.   HENT:      Head: Normocephalic and atraumatic.      Nose: Nose normal.   Eyes:      Conjunctiva/sclera: Conjunctivae normal.   Pulmonary:      Effort: Pulmonary effort is normal.   Abdominal:      General: There is no distension.   Musculoskeletal:      Cervical back: Normal range of motion and neck supple.   Skin:     Findings: No rash.      Comments: No icterus   Neurological:      Mental Status: He is alert and oriented to person, place, and time.         Labs:   Lab Results   Component Value Date    HGBA1C 8.0 (H) 07/26/2024       Lab Results   Component Value Date    TSH 1.17 12/31/2019       Lab Results   Component Value Date    CREATININE 0.70 09/09/2024    CREATININE 0.75 09/02/2024    CREATININE 0.76 08/26/2024    BUN 21 08/07/2024    K 4.1 08/07/2024     08/07/2024    CO2 23 08/07/2024     GFR, " "Calculated   Date Value Ref Range Status   12/31/2019 63 >60 mL/min/1.73m2 Final     Comment:     mL/min per 1.73 square meters                                            Normal Function or Mild Renal    Disease (if clinically at risk):  >or=60  Moderately Decreased:                30-59  Severely Decreased:                  15-29  Renal Failure:                         <15                                            -American GFR: multiply reported GFR by 1.16    Please note that the eGFR is based on the CKD-EPI calculation, and is not intended to be used for drug dosing.                                            Note: Calculated GFR may not be an accurate indicator of renal function if the patient's renal function is not in a steady state.     eGFRcr   Date Value Ref Range Status   05/08/2024 89 >59 Final     eGFR   Date Value Ref Range Status   09/09/2024 96 ml/min/1.73sq m Final       Lab Results   Component Value Date    ALT 22 08/02/2024    AST 23 08/02/2024    ALKPHOS 100 08/02/2024       Lab Results   Component Value Date    CHOLESTEROL 115 07/26/2024     Lab Results   Component Value Date    HDL 50 07/26/2024     Lab Results   Component Value Date    TRIG 79 07/26/2024     No results found for: \"NONHDLC\"      Assessment/Plan:    1. Type 2 diabetes mellitus with hyperglycemia, without long-term current use of insulin (HCC)  Assessment & Plan:  He is significantly improved. Note weight gain.    We agreed to continue pioglitazone/metformin for now.   In future, will aim to remove pioglitazone and then eventually metformin once A1c is <6%.    Follow up in 6 months.    Lab Results   Component Value Date    HGBA1C 8.0 (H) 07/26/2024     Orders:  -     Hemoglobin A1C; Future  2. Diabetic polyneuropathy associated with type 2 diabetes mellitus (HCC)  3. Class 1 obesity with serious comorbidity in adult, unspecified BMI, unspecified obesity type  4. Mixed hyperlipidemia  Assessment & Plan:  He is on crestor " and zetia.  5. Morbid (severe) obesity due to excess calories (HCC)        I have spent a total time of  minutes on 01/30/25 in caring for this patient including greater than 50% of this time was spent in counseling/coordination of care as listed above.       Discussed with the patient and all questioned fully answered. He will contact me with concerns.    Larisa Nuñez

## 2025-01-30 NOTE — ASSESSMENT & PLAN NOTE
He is significantly improved. Note weight gain.    We agreed to continue pioglitazone/metformin for now.   In future, will aim to remove pioglitazone and then eventually metformin once A1c is <6%.    Follow up in 6 months.    Lab Results   Component Value Date    HGBA1C 8.0 (H) 07/26/2024

## 2025-02-26 ENCOUNTER — OFFICE VISIT (OUTPATIENT)
Dept: CARDIOLOGY CLINIC | Facility: CLINIC | Age: 70
End: 2025-02-26
Payer: COMMERCIAL

## 2025-02-26 VITALS
SYSTOLIC BLOOD PRESSURE: 114 MMHG | BODY MASS INDEX: 34.04 KG/M2 | WEIGHT: 251 LBS | DIASTOLIC BLOOD PRESSURE: 60 MMHG | OXYGEN SATURATION: 99 % | HEART RATE: 64 BPM

## 2025-02-26 DIAGNOSIS — E78.2 MIXED HYPERLIPIDEMIA: ICD-10-CM

## 2025-02-26 DIAGNOSIS — I10 PRIMARY HYPERTENSION: Primary | ICD-10-CM

## 2025-02-26 DIAGNOSIS — I35.0 NONRHEUMATIC AORTIC VALVE STENOSIS: ICD-10-CM

## 2025-02-26 DIAGNOSIS — R79.89 ELEVATED TROPONIN: ICD-10-CM

## 2025-02-26 PROBLEM — R01.1 CARDIAC MURMUR: Status: RESOLVED | Noted: 2024-07-26 | Resolved: 2025-02-26

## 2025-02-26 PROBLEM — R07.9 CHEST PAIN: Status: RESOLVED | Noted: 2018-11-30 | Resolved: 2025-02-26

## 2025-02-26 PROCEDURE — 99214 OFFICE O/P EST MOD 30 MIN: CPT | Performed by: INTERNAL MEDICINE

## 2025-02-26 NOTE — ASSESSMENT & PLAN NOTE
Admitted with chest pain, and found to have elevated troponin  Status post echocardiogram revealing no significant wall motion abnormalities and normal LV function  Minimal nonobstructive CAD seen on cardiac catheterization in July 2024 - complicated by groin infection/abscess with surgical fixation and wound vac  Unclear etiology of elevated troponin, possibly related to microvascular disease

## 2025-02-26 NOTE — ASSESSMENT & PLAN NOTE
Currently well-controlled on lisinopril with HCTZ  No changes required today, but if BPs are running in 90's-100's, then can cut back lisinoprol-HTZ to 1 pill a day rather than 2

## 2025-02-26 NOTE — PROGRESS NOTES
Cardiology Follow Up    Juan Carlos Cleveland Jr.  1955  [id number]  [facility]  [address] [facility] [address]    [facility] [address] [phone]      Assessment & Plan  Primary hypertension  Currently well-controlled on lisinopril with HCTZ  No changes required today, but if BPs are running in 90's-100's, then can cut back lisinoprol-HTZ to 1 pill a day rather than 2  Nonrheumatic aortic valve stenosis  Normal LV size and function on echocardiogram in August 2024 with mild stenosis of the aortic valve, mean gradient 16 mmHg  Continue medical management with aspirin, statin  Mixed hyperlipidemia  Currently on statin and Zetia  LDL 49 in July 2024  Elevated troponin  Admitted with chest pain, and found to have elevated troponin  Status post echocardiogram revealing no significant wall motion abnormalities and normal LV function  Minimal nonobstructive CAD seen on cardiac catheterization in July 2024 - complicated by groin infection/abscess with surgical fixation and wound vac  Unclear etiology of elevated troponin, possibly related to microvascular disease    Chief Complaint   Patient presents with    Follow-up    Swelling     Both        Interval History: Patient feels well, without complaints.  No reported chest pain, shortness of breath, palpitations, lightheadedness, syncope, LE edema, orthopnea, PND, or significant weight changes.  Patient remains active without any increased fatigue out of the ordinary.        Blood pressure 114/60, pulse 64, weight 114 kg (251 lb), SpO2 99%.      Review of Systems:  Review of Systems   Constitutional:  Negative for activity change, appetite change, fatigue and fever.   HENT:  Negative for nosebleeds and sore throat.    Eyes:  Negative for photophobia and visual disturbance.   Respiratory:  Negative for cough, chest tightness, shortness of breath and wheezing.    Cardiovascular:  Negative for chest pain, palpitations and leg swelling.    Gastrointestinal:  Negative for abdominal pain, diarrhea, nausea and vomiting.   Endocrine: Negative for polyuria.   Genitourinary:  Negative for dysuria, frequency and hematuria.   Musculoskeletal:  Negative for arthralgias, back pain and gait problem.   Skin:  Negative for pallor and rash.   Neurological:  Negative for dizziness, syncope, speech difficulty and light-headedness.   Hematological:  Does not bruise/bleed easily.   Psychiatric/Behavioral:  Negative for agitation, behavioral problems and confusion.        Physical Exam:  Physical Exam  Vitals reviewed.   Constitutional:       General: He is not in acute distress.     Appearance: Normal appearance. He is well-developed. He is not diaphoretic.   HENT:      Head: Normocephalic and atraumatic.      Nose: Nose normal.   Eyes:      General: No scleral icterus.     Extraocular Movements: Extraocular movements intact.      Pupils: Pupils are equal, round, and reactive to light.   Neck:      Vascular: No JVD.   Cardiovascular:      Rate and Rhythm: Normal rate and regular rhythm.      Heart sounds: S1 normal and S2 normal. Heart sounds not distant. Murmur heard.      Systolic murmur is present with a grade of 2/6.      No friction rub. No gallop. No S3 sounds.   Pulmonary:      Effort: Pulmonary effort is normal. No respiratory distress.      Breath sounds: Normal breath sounds. No wheezing or rales.   Abdominal:      General: Bowel sounds are normal. There is no distension.      Palpations: Abdomen is soft.   Musculoskeletal:         General: No deformity.      Cervical back: Normal range of motion and neck supple.      Right lower leg: No edema.      Left lower leg: No edema.   Skin:     General: Skin is warm and dry.      Findings: No erythema.   Neurological:      Mental Status: He is alert and oriented to person, place, and time.      Cranial Nerves: No cranial nerve deficit.   Psychiatric:         Mood and Affect: Mood normal.         Behavior: Behavior  normal.         Patient Active Problem List   Diagnosis    History of DVT (deep vein thrombosis)    Localized swelling, mass and lump, right lower limb    Blood in stool    Other constipation    BRBPR (bright red blood per rectum)    Dysphagia    Elevated serum creatinine    Type 2 diabetes mellitus with hyperglycemia, without long-term current use of insulin ([facility])    Hypertension    Morbid (severe) obesity due to excess calories ([facility])    Chronic bilateral low back pain without sciatica    Hematoma    Postoperative hematoma involving circulatory system following cardiac catheterization    Aortic stenosis    Diabetic polyneuropathy associated with type 2 diabetes mellitus ([facility])    Weight gain    Mixed hyperlipidemia    Elevated troponin     Past Medical History:   Diagnosis Date    Diabetes mellitus ([facility])     boaderline    DVT (deep venous thrombosis) ([facility])     History of blood clots right leg      june 23 2020    Hypertension     PONV (postoperative nausea and vomiting)     nausea after shoulder surgery     Venous stasis dermatitis of right lower extremity 06/24/2020     Social History     Socioeconomic History    Marital status: /Civil Union     Spouse name: Not on file    Number of children: Not on file    Years of education: Not on file    Highest education level: Not on file   Occupational History    Not on file   Tobacco Use    Smoking status: Never    Smokeless tobacco: Never   Vaping Use    Vaping status: Never Used   Substance and Sexual Activity    Alcohol use: No    Drug use: No    Sexual activity: Not on file   Other Topics Concern    Not on file   Social History Narrative    Not on file     Social Drivers of Health     Financial Resource Strain: Low Risk  (5/8/2024)    Received from [facility]    Overall Financial Resource Strain (CARDIA)     Difficulty of Paying Living Expenses: Not very hard   Food Insecurity: No Food Insecurity (8/5/2024)    Nursing - Inadequate Food Risk  Classification     Worried About Running Out of Food in the Last Year: Never true     Ran Out of Food in the Last Year: Never true     Ran Out of Food in the Last Year: Not on file   Transportation Needs: No Transportation Needs (9/16/2024)    OASIS : Transportation     Lack of Transportation (Medical): No     Lack of Transportation (Non-Medical): No     Patient Unable or Declines to Respond: No   Physical Activity: Not on file   Stress: Not on file   Social Connections: Not on file   Intimate Partner Violence: Not At Risk (5/8/2024)    [facility], [facility]    Humiliation, Afraid, Rape, and Kick questionnaire     Fear of Current or Ex-Partner: No     Emotionally Abused: No     Physically Abused: No     Sexually Abused: No   Housing Stability: Low Risk  (8/5/2024)    Housing Stability Vital Sign     Unable to Pay for Housing in the Last Year: No     Number of Times Moved in the Last Year: 0     Homeless in the Last Year: No   Recent Concern: Housing Stability - High Risk (8/1/2024)    Housing Stability Vital Sign     Unable to Pay for Housing in the Last Year: Yes     Number of Times Moved in the Last Year: Not on file     Homeless in the Last Year: Yes      History reviewed. No pertinent family history.  Past Surgical History:   Procedure Laterality Date    APPENDECTOMY      BACK SURGERY      CARDIAC CATHETERIZATION N/A 7/26/2024    Procedure: Cardiac Coronary Angiogram;  Surgeon: Raymond Noriega MD;  [facility];  Service: Cardiology    COLONOSCOPY  11/19/2016    ESOPHAGOGASTRODUODENOSCOPY      ESOPHAGOGASTRODUODENOSCOPY N/A 10/26/2016    Procedure: ESOPHAGOGASTRODUODENOSCOPY (EGD);  Surgeon: Tito Rutherford MD;  Location: [facility];  Service:     CA COLONOSCOPY FLX DX W/COLLJ SPEC WHEN PFRMD N/A 11/19/2016    Procedure: COLONOSCOPY;  Surgeon: Sandor Hay MD;  [facility]: [facility];  Service: Gastroenterology    CA EGD BALLOON DILATION  ESOPHAGUS <30 MM DIAM N/A 10/26/2016    Procedure: BALLOON DILATION ;  Surgeon: Tito Rutherford MD;  [facility]: [facility];  Service: Gastroenterology    MI SURGICAL ARTHROSCOPY SHOULDER W/ROTATOR CUFF RPR Left 04/21/2016    Procedure: LEFT SHOULDER ARTHROSCOPIC ROTATOR CUFF REPAIR, SUBACROMIAL DECOMPRESSION, BICEPS TENODESIS;  Surgeon: Amado Avelar MD;  Location: [facility];  Service: Orthopedics    MI TENDON SHEATH INCISION Right 11/15/2016    Procedure: INDEX TRIGGER FINGER RELEASE ;  Surgeon: Todd Silva MD;  [facility];  Service: Orthopedics    WOUND DEBRIDEMENT Right 8/2/2024    Procedure: DEBRIDEMENT LOWER EXTREMITY (WASH OUT);  Surgeon: Jenifer Zee MD;  Location: [facility];  Service: Vascular    WOUND DEBRIDEMENT Right 8/6/2024    Procedure: Right groin exploration, washout, debridement of infected subcutaneous tissue and artery, arterial exploration, primary closure, deep tissue closure, VAC placement;  Surgeon: Jasmin Denny MD;  [facility]: [facility];  Service: Vascular       Current Outpatient Medications:     APPLE CIDER VINEGAR PO, Take 480 mg by mouth in the morning, Disp: , Rfl:     aspirin (ECOTRIN LOW STRENGTH) 81 mg EC tablet, Take 1 tablet (81 mg total) by mouth daily, Disp: 360 tablet, Rfl: 0    Chromium-Cinnamon (Cinnamon Plus Chromium) 200-1000 MCG-MG CAPS, Take 2,000 mg by mouth 2 (two) times a day, Disp: , Rfl:     ezetimibe (ZETIA) 10 mg tablet, Take 10 mg by mouth daily, Disp: , Rfl:     Christiano, Zingiber officinalis, (CHRISTIANO ROOT PO), Take 1,500 mg by mouth in the morning, Disp: , Rfl:     lisinopril-hydrochlorothiazide (PRINZIDE,ZESTORETIC) 20-25 MG per tablet, Take 2 tablets by mouth daily Patient taking 2 tablets daily as instructed on prescription , Disp: , Rfl:     Magnesium 400 MG TABS, Take 1 tablet by mouth in the morning, Disp: , Rfl:     Multiple Vitamins-Minerals (CENTRUM SILVER PO), Take 1 tablet by mouth daily., Disp: , Rfl:     Nutritional Supplements  (ENSURE PO), Take by mouth in the morning, Disp: , Rfl:     Omega-3 Fatty Acids (FISH OIL PO), Take 2,400 mg by mouth every other day, Disp: , Rfl:     pioglitazone-metFORMIN (ACTOPLUS MET)  MG per tablet, Take 1 tablet by mouth 2 (two) times a day with meals, Disp: 60 tablet, Rfl: 0    Potassium 99 MG TABS, Take 1 tablet by mouth in the morning, Disp: , Rfl:     Probiotic, Lactobacillus, CAPS, Take 1 capsule by mouth in the morning. Naturemaria g ortiz 100 million organism, Disp: , Rfl:     rosuvastatin (CRESTOR) 20 MG tablet, Take 20 mg by mouth daily, Disp: , Rfl:     tadalafil (CIALIS) 5 MG tablet, Take 5 mg by mouth daily as needed for erectile dysfunction., Disp: , Rfl:     Tart Cherry 1200 MG CAPS, Take 1 capsule by mouth in the morning, Disp: , Rfl:     TURMERIC PO, Take 1,000 mg by mouth in the morning, Disp: , Rfl:     acetaminophen (TYLENOL) 325 mg tablet, Take 3 tablets (975 mg total) by mouth every 8 (eight) hours (Patient not taking: Reported on 8/22/2024), Disp: , Rfl:     bisacodyl (DULCOLAX) 5 mg EC tablet, Take 2 tablets (10 mg total) by mouth once for 1 dose (Patient not taking: Reported on 8/22/2024), Disp: 2 tablet, Rfl: 0    cyanocobalamin (VITAMIN B-12) 100 MCG tablet, Take 1,000 mcg by mouth daily (Patient not taking: Reported on 1/30/2025), Disp: , Rfl:     folic acid (FOLVITE) 400 mcg tablet, Take 400 mcg by mouth daily. (Patient not taking: Reported on 8/22/2024), Disp: , Rfl:     nystatin (MYCOSTATIN) powder, Apply topically in the morning (Patient not taking: Reported on 10/10/2024), Disp: 30 g, Rfl: 1    sodium chloride, PF, 0.9 %, Inject 10 mL into a catheter in a vein every 8 (eight) hours. before and after IV antibiotic infusion (Patient not taking: Reported on 9/16/2024), Disp: , Rfl:   Allergies   Allergen Reactions    Tetanus Toxoid Abdominal Pain    Tetanus Toxoids     Tetanus-Diphtheria Toxoids Td Swelling    Fenofibrate Hives, Abdominal Pain and Rash     Generic         Labs:  Lab Requisition on 09/09/2024   Component Date Value    WBC 09/09/2024 4.44     RBC 09/09/2024 3.76 (L)     Hemoglobin 09/09/2024 11.5 (L)     Hematocrit 09/09/2024 34.4 (L)     MCV 09/09/2024 92     MCH 09/09/2024 30.6     MCHC 09/09/2024 33.4     RDW 09/09/2024 14.8     MPV 09/09/2024 10.5     Platelets 09/09/2024 288     nRBC 09/09/2024 0     Segmented % 09/09/2024 68     Immature Grans % 09/09/2024 0     Lymphocytes % 09/09/2024 20     Monocytes % 09/09/2024 9     Eosinophils Relative 09/09/2024 2     Basophils Relative 09/09/2024 1     Absolute Neutrophils 09/09/2024 3.04     Absolute Immature Grans 09/09/2024 0.01     Absolute Lymphocytes 09/09/2024 0.90     Absolute Monocytes 09/09/2024 0.38     Eosinophils Absolute 09/09/2024 0.07     Basophils Absolute 09/09/2024 0.04     Creatinine 09/09/2024 0.70     eGFR 09/09/2024 96    Lab Requisition on 09/02/2024   Component Date Value    WBC 09/02/2024 5.18     RBC 09/02/2024 3.75 (L)     Hemoglobin 09/02/2024 11.2 (L)     Hematocrit 09/02/2024 34.3 (L)     MCV 09/02/2024 92     MCH 09/02/2024 29.9     MCHC 09/02/2024 32.7     RDW 09/02/2024 14.9     MPV 09/02/2024 10.7     Platelets 09/02/2024 248     nRBC 09/02/2024 0     Segmented % 09/02/2024 69     Immature Grans % 09/02/2024 0     Lymphocytes % 09/02/2024 19     Monocytes % 09/02/2024 9     Eosinophils Relative 09/02/2024 2     Basophils Relative 09/02/2024 1     Absolute Neutrophils 09/02/2024 3.55     Absolute Immature Grans 09/02/2024 0.02     Absolute Lymphocytes 09/02/2024 0.98     Absolute Monocytes 09/02/2024 0.48     Eosinophils Absolute 09/02/2024 0.12     Basophils Absolute 09/02/2024 0.03     Creatinine 09/02/2024 0.75     eGFR 09/02/2024 94      Lab Results   Component Value Date    TRIG 79 07/26/2024    HDL 50 07/26/2024     Imaging: No results found.

## 2025-02-26 NOTE — ASSESSMENT & PLAN NOTE
Normal LV size and function on echocardiogram in August 2024 with mild stenosis of the aortic valve, mean gradient 16 mmHg  Continue medical management with aspirin, statin

## 2025-04-16 ENCOUNTER — OFFICE VISIT (OUTPATIENT)
Dept: VASCULAR SURGERY | Facility: CLINIC | Age: 70
End: 2025-04-16
Payer: COMMERCIAL

## 2025-04-16 VITALS
DIASTOLIC BLOOD PRESSURE: 76 MMHG | BODY MASS INDEX: 35.08 KG/M2 | OXYGEN SATURATION: 97 % | WEIGHT: 259 LBS | TEMPERATURE: 98.8 F | HEART RATE: 61 BPM | HEIGHT: 72 IN | SYSTOLIC BLOOD PRESSURE: 110 MMHG

## 2025-04-16 DIAGNOSIS — E66.01 MORBID (SEVERE) OBESITY DUE TO EXCESS CALORIES (HCC): ICD-10-CM

## 2025-04-16 DIAGNOSIS — I97.630 POSTOPERATIVE HEMATOMA INVOLVING CIRCULATORY SYSTEM FOLLOWING CARDIAC CATHETERIZATION: Primary | ICD-10-CM

## 2025-04-16 PROBLEM — R22.41 LOCALIZED SWELLING, MASS AND LUMP, RIGHT LOWER LIMB: Status: RESOLVED | Noted: 2020-06-24 | Resolved: 2025-04-16

## 2025-04-16 PROCEDURE — 99212 OFFICE O/P EST SF 10 MIN: CPT | Performed by: SURGERY
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
## 2025-04-16 NOTE — ASSESSMENT & PLAN NOTE
We discussed the importance of weight loss.  Set realistic goal of losing  0.5 lb per week for about 2-3 lb per month, 25 lb or so per year.  Regular exercise and diet modification is important.

## 2025-05-01 ENCOUNTER — OFFICE VISIT (OUTPATIENT)
Dept: OBGYN CLINIC | Facility: CLINIC | Age: 70
End: 2025-05-01
Payer: COMMERCIAL

## 2025-05-01 DIAGNOSIS — M65.332 TRIGGER MIDDLE FINGER OF LEFT HAND: Primary | ICD-10-CM

## 2025-05-01 PROCEDURE — 99204 OFFICE O/P NEW MOD 45 MIN: CPT | Performed by: STUDENT IN AN ORGANIZED HEALTH CARE EDUCATION/TRAINING PROGRAM

## 2025-05-01 PROCEDURE — 20550 NJX 1 TENDON SHEATH/LIGAMENT: CPT | Performed by: STUDENT IN AN ORGANIZED HEALTH CARE EDUCATION/TRAINING PROGRAM

## 2025-05-01 RX ADMIN — BETAMETHASONE SODIUM PHOSPHATE AND BETAMETHASONE ACETATE 6 MG: 3; 3 INJECTION, SUSPENSION INTRA-ARTICULAR; INTRALESIONAL; INTRAMUSCULAR; SOFT TISSUE at 14:15

## 2025-05-01 RX ADMIN — LIDOCAINE HYDROCHLORIDE 1 ML: 10 INJECTION, SOLUTION INFILTRATION; PERINEURAL at 14:15

## 2025-05-01 NOTE — PROGRESS NOTES
ORTHOPAEDIC HAND, WRIST, AND ELBOW OFFICE  VISIT      ASSESSMENT/PLAN:      Assessment & Plan  Trigger middle finger of left hand  Discussed non operative verus surgical intervention. The patient elected to proceed with non operative treatment options. The patient consented and underwent a left long trigger finger injection in the office today without any complications. Discussed this  can be curative but can take up to 2-3 injections.            Follow Up:  6 weeks       To Do Next Visit:  Re-evaluation of current issue      Discussions:  Trigger Finger: The anatomy and physiology of trigger finger was discussed with the patient today in the office.  Edema and increased contact pressure within the flexor tendons at the A1 pulley can cause pain, crepitation, and triggering or locking of the digit resulting in limitation of function.  Symptoms can occur at anytime but are typically worse in the morning or after a brief rest from repetitive activity.  Treatment options include resting/nighttime MP blocking splints to decrease edema, oral anti-inflammatory medications, home or formal therapy exercises, up to 2 steroid injections within the tendon sheath, or surgical release.  While majority of patients do respond to conservative treatment, up to 20% may require surgical release.       Yaniv Acevedo MD  Attending, Orthopaedic Surgery  Hand, Wrist, and Elbow Surgery  [facility]    ______________________________________________________________________________________________    CHIEF COMPLAINT:  Chief Complaint   Patient presents with    Left Middle Finger - Locking, Triggering       SUBJECTIVE:  Patient is a 69 y.o. ambidextrous male who presents today for evaluation and treatment of left long trigger finger. The patient states this has been ongoing for the past 2 months and has been getting worse. He states the finger will lock and he will have to force it open. He notes pain over the A1  pulley. He has a history of right index trigger finger release performed in 2016. He denies prior surgery on the left hand.      Occupation:       I have personally reviewed all the relevant PMH, PSH, SH, FH, Medications and allergies      PAST MEDICAL HISTORY:  Past Medical History:   Diagnosis Date    Diabetes mellitus (HCC)     boaderline    DVT (deep venous thrombosis) (HCC)     History of blood clots right leg      june 23 2020    Hypertension     Localized swelling, mass and lump, right lower limb 06/24/2020    PONV (postoperative nausea and vomiting)     nausea after shoulder surgery     Venous stasis dermatitis of right lower extremity 06/24/2020       PAST SURGICAL HISTORY:  Past Surgical History:   Procedure Laterality Date    APPENDECTOMY      BACK SURGERY      CARDIAC CATHETERIZATION N/A 7/26/2024    Procedure: Cardiac Coronary Angiogram;  Surgeon: Raymond Noriega MD;  [facility];  Service: Cardiology    COLONOSCOPY  11/19/2016    ESOPHAGOGASTRODUODENOSCOPY      ESOPHAGOGASTRODUODENOSCOPY N/A 10/26/2016    Procedure: ESOPHAGOGASTRODUODENOSCOPY (EGD);  Surgeon: Tito Rutherford MD;  Location: [facility];  Service:     SC COLONOSCOPY FLX DX W/COLLJ SPEC WHEN PFRMD N/A 11/19/2016    Procedure: COLONOSCOPY;  Surgeon: Sandor Hay MD;  [facility]: [facility];  Service: Gastroenterology    SC EGD BALLOON DILATION ESOPHAGUS <30 MM DIAM N/A 10/26/2016    Procedure: BALLOON DILATION ;  Surgeon: Tito Rutherford MD;  [facility];  Service: Gastroenterology    SC SURGICAL ARTHROSCOPY SHOULDER W/ROTATOR CUFF RPR Left 04/21/2016    Procedure: LEFT SHOULDER ARTHROSCOPIC ROTATOR CUFF REPAIR, SUBACROMIAL DECOMPRESSION, BICEPS TENODESIS;  Surgeon: Amado Avelar MD;  [facility];  Service: Orthopedics    SC TENDON SHEATH INCISION Right 11/15/2016    Procedure: INDEX TRIGGER FINGER RELEASE ;  Surgeon: Todd Silva MD;  [facility];  Service: Orthopedics    WOUND  DEBRIDEMENT Right 8/2/2024    Procedure: DEBRIDEMENT LOWER EXTREMITY (WASH OUT);  Surgeon: Jenifer Zee MD;  Location: [facility];  Service: Vascular    WOUND DEBRIDEMENT Right 8/6/2024    Procedure: Right groin exploration, washout, debridement of infected subcutaneous tissue and artery, arterial exploration, primary closure, deep tissue closure, VAC placement;  Surgeon: Jasmin Denny MD;  [facility];  Service: Vascular       FAMILY HISTORY:  History reviewed. No pertinent family history.    SOCIAL HISTORY:  Social History     Tobacco Use    Smoking status: Never    Smokeless tobacco: Never   Vaping Use    Vaping status: Never Used   Substance Use Topics    Alcohol use: No    Drug use: No       MEDICATIONS:    Current Outpatient Medications:     APPLE CIDER VINEGAR PO, Take 480 mg by mouth in the morning, Disp: , Rfl:     aspirin (ECOTRIN LOW STRENGTH) 81 mg EC tablet, Take 1 tablet (81 mg total) by mouth daily, Disp: 360 tablet, Rfl: 0    Chromium-Cinnamon (Cinnamon Plus Chromium) 200-1000 MCG-MG CAPS, Take 2,000 mg by mouth 2 (two) times a day, Disp: , Rfl:     ezetimibe (ZETIA) 10 mg tablet, Take 10 mg by mouth daily, Disp: , Rfl:     Christiano, Zingiber officinalis, (CHRISTIANO ROOT PO), Take 1,500 mg by mouth in the morning, Disp: , Rfl:     lisinopril-hydrochlorothiazide (PRINZIDE,ZESTORETIC) 20-25 MG per tablet, Take 2 tablets by mouth daily Patient taking 2 tablets daily as instructed on prescription , Disp: , Rfl:     Magnesium 400 MG TABS, Take 1 tablet by mouth in the morning, Disp: , Rfl:     Multiple Vitamins-Minerals (CENTRUM SILVER PO), Take 1 tablet by mouth daily., Disp: , Rfl:     Nutritional Supplements (ENSURE PO), Take by mouth in the morning, Disp: , Rfl:     Omega-3 Fatty Acids (FISH OIL PO), Take 2,400 mg by mouth every other day, Disp: , Rfl:     Potassium 99 MG TABS, Take 1 tablet by mouth in the morning, Disp: , Rfl:     Probiotic, Lactobacillus, CAPS, Take 1 capsule by mouth  in the morning. Natures bounty 100 million organism, Disp: , Rfl:     rosuvastatin (CRESTOR) 20 MG tablet, Take 20 mg by mouth daily, Disp: , Rfl:     tadalafil (CIALIS) 5 MG tablet, Take 5 mg by mouth daily as needed for erectile dysfunction., Disp: , Rfl:     Tart Cherry 1200 MG CAPS, Take 1 capsule by mouth in the morning, Disp: , Rfl:     TURMERIC PO, Take 1,000 mg by mouth in the morning, Disp: , Rfl:     acetaminophen (TYLENOL) 325 mg tablet, Take 3 tablets (975 mg total) by mouth every 8 (eight) hours (Patient not taking: Reported on 8/22/2024), Disp: , Rfl:     bisacodyl (DULCOLAX) 5 mg EC tablet, Take 2 tablets (10 mg total) by mouth once for 1 dose (Patient not taking: Reported on 8/22/2024), Disp: 2 tablet, Rfl: 0    cyanocobalamin (VITAMIN B-12) 100 MCG tablet, Take 1,000 mcg by mouth daily (Patient not taking: Reported on 1/30/2025), Disp: , Rfl:     folic acid (FOLVITE) 400 mcg tablet, Take 400 mcg by mouth daily. (Patient not taking: Reported on 8/22/2024), Disp: , Rfl:     nystatin (MYCOSTATIN) powder, Apply topically in the morning (Patient not taking: Reported on 10/10/2024), Disp: 30 g, Rfl: 1    pioglitazone-metFORMIN (ACTOPLUS MET)  MG per tablet, Take 1 tablet by mouth 2 (two) times a day with meals, Disp: 60 tablet, Rfl: 0    sodium chloride, PF, 0.9 %, Inject 10 mL into a catheter in a vein every 8 (eight) hours. before and after IV antibiotic infusion (Patient not taking: Reported on 9/16/2024), Disp: , Rfl:     ALLERGIES:  Allergies   Allergen Reactions    Tetanus Toxoid Abdominal Pain    Tetanus Toxoids     Tetanus-Diphtheria Toxoids Td Swelling    Fenofibrate Hives, Abdominal Pain and Rash     Generic            REVIEW OF SYSTEMS:  Musculoskeletal:        As noted in HPI.   All other systems reviewed and are negative.    VITALS:  There were no vitals filed for this visit.    LABS:  HgA1c:   Lab Results   Component Value Date    HGBA1C 8.0 (H) 07/26/2024     BMP:   Lab Results    Component Value Date    CALCIUM 8.4 08/07/2024    K 4.1 08/07/2024    CO2 23 08/07/2024     08/07/2024    BUN 21 08/07/2024    CREATININE 0.70 09/09/2024       _____________________________________________________  PHYSICAL EXAMINATION:  General: Well developed and well nourished, alert & oriented x 3, appears comfortable  Psychiatric: Normal  HEENT: Normocephalic, Atraumatic Trachea Midline, No torticollis  Pulmonary: No audible wheezing or respiratory distress   Abdomen/GI: Non tender, non distended   Cardiovascular: No pitting edema, 2+ radial pulse   Skin: No masses, erythema, lacerations, fluctation, ulcerations  Neurovascular: Sensation Intact to the Median, Ulnar, Radial Nerve, Motor Intact to the Median, Ulnar, Radial Nerve, and Pulses Intact  Musculoskeletal: Normal, except as noted in detailed exam and in HPI.      MUSCULOSKELETAL EXAMINATION:  Left Long Finger:    positive tenderness to palpation of A1 pulley.  negative nodule.  positive clicking.  positive triggering.      ___________________________________________________  STUDIES REVIEWED:  No new studies to review         PROCEDURES PERFORMED:  Hand/upper extremity injection: L long A1  Universal Protocol:  procedure performed by consultantConsent: Verbal consent obtained.  Consent given by: patient  Patient identity confirmed: verbally with patient  Supporting Documentation  Indications: pain   Procedure Details  Condition:trigger finger Location: long finger - L long A1   Needle size: 25 G  Ultrasound guidance: no  Medications administered: 1 mL lidocaine 1 %; 6 mg betamethasone acetate-betamethasone sodium phosphate 6 (3-3) mg/mL  Patient tolerance: patient tolerated the procedure well with no immediate complications  Dressing:  Sterile dressing applied         -    _____________________________________________________      [facility] Attestation      I,:  Katherine Apple MA am acting as a scribe while in the presence of the attending  physician.:       I,:  Yaniv Acevedo MD personally performed the services described in this documentation    as scribed in my presence.:

## 2025-05-02 RX ORDER — BETAMETHASONE SODIUM PHOSPHATE AND BETAMETHASONE ACETATE 3; 3 MG/ML; MG/ML
6 INJECTION, SUSPENSION INTRA-ARTICULAR; INTRALESIONAL; INTRAMUSCULAR; SOFT TISSUE
Status: COMPLETED | OUTPATIENT
Start: 2025-05-01 | End: 2025-05-01

## 2025-05-02 RX ORDER — LIDOCAINE HYDROCHLORIDE 10 MG/ML
1 INJECTION, SOLUTION INFILTRATION; PERINEURAL
Status: COMPLETED | OUTPATIENT
Start: 2025-05-01 | End: 2025-05-01

## 2025-06-11 ENCOUNTER — PATIENT MESSAGE (OUTPATIENT)
Dept: ENDOCRINOLOGY | Facility: CLINIC | Age: 70
End: 2025-06-11

## (undated) DEVICE — CATH DIAG 6FR IMPULSE 100CM FL4

## (undated) DEVICE — DGW .035 FC J3MM 150CM TEF: Brand: EMERALD

## (undated) DEVICE — TUBING SUCTION 5MM X 12 FT

## (undated) DEVICE — PENCIL ELECTROSURG E-Z CLEAN -0035H

## (undated) DEVICE — DRAPE SHEET THREE QUARTER

## (undated) DEVICE — SUT SILK 4-0 18 IN A183H

## (undated) DEVICE — MEDI-VAC YANKAUER SUCTION HANDLE W/STRAIGHT TIP & CONTROL VENT: Brand: CARDINAL HEALTH

## (undated) DEVICE — RADIFOCUS OPTITORQUE ANGIOGRAPHIC CATHETER: Brand: OPTITORQUE

## (undated) DEVICE — GLOVE INDICATOR PI UNDERGLOVE SZ 6.5 BLUE

## (undated) DEVICE — GLOVE SRG BIOGEL ECLIPSE 6

## (undated) DEVICE — GLIDESHEATH BASIC HYDROPHILIC COATED INTRODUCER SHEATH: Brand: GLIDESHEATH

## (undated) DEVICE — SUT SILK 2-0 18 IN A185H

## (undated) DEVICE — PREMIUM DRY TRAY LF: Brand: MEDLINE INDUSTRIES, INC.

## (undated) DEVICE — CATH DIAG 6FR IMPULSE 100CM FR4

## (undated) DEVICE — ABDOMINAL PAD: Brand: DERMACEA

## (undated) DEVICE — GAUZE SPONGES,16 PLY: Brand: CURITY

## (undated) DEVICE — VAC CANISTER 500ML

## (undated) DEVICE — GUIDEWIRE WHOLEY .035 145 CM FLOP STR TIP

## (undated) DEVICE — INTENDED FOR TISSUE SEPARATION, AND OTHER PROCEDURES THAT REQUIRE A SHARP SURGICAL BLADE TO PUNCTURE OR CUT.: Brand: BARD-PARKER SAFETY BLADES SIZE 15, STERILE

## (undated) DEVICE — SUTURE BOOTS YELLOW

## (undated) DEVICE — GUIDEWIRE .035 260CM 3MM J TIP

## (undated) DEVICE — TIBURON SPLIT SHEET: Brand: CONVERTORS

## (undated) DEVICE — V.A.C. DRAPE: Brand: V.A.C.®

## (undated) DEVICE — BETHLEHEM UNIVERSAL MINOR GEN: Brand: CARDINAL HEALTH

## (undated) DEVICE — VAC DRESSING SENSATRAC SMALL

## (undated) DEVICE — SURGICEL 4 X 8IN

## (undated) DEVICE — GLOVE SRG BIOGEL 7.5

## (undated) DEVICE — CURITY IDOFORM PACKING STRIP: Brand: CURITY

## (undated) DEVICE — VESSEL LOOPS X-RAY DETECTABLE: Brand: DEROYAL

## (undated) DEVICE — GLOVE INDICATOR PI UNDERGLOVE SZ 8 BLUE

## (undated) DEVICE — SUT PROLENE 3-0 SH 48 IN 8534H

## (undated) DEVICE — MICROPUNCTURE INTRODUCER SET SILHOUETTE TRANSITIONLESS PUSH-PLUS DESIGN - STIFFENED CANNULA WITH NITINOL WIRE GUIDE: Brand: MICROPUNCTURE

## (undated) DEVICE — VESSEL CANNULA

## (undated) DEVICE — SPONGE STICK WITH PVP-I: Brand: KENDALL

## (undated) DEVICE — LIGACLIP MCA MULTIPLE CLIP APPLIERS, 20 SMALL CLIPS: Brand: LIGACLIP

## (undated) DEVICE — PAD GROUNDING DUAL ADULT

## (undated) DEVICE — SUT PROLENE 5-0 C-1/C-1 36 IN 8720ZH

## (undated) DEVICE — SUT PROLENE 6-0 BV130 30 IN 8709H